# Patient Record
Sex: MALE | Race: WHITE | NOT HISPANIC OR LATINO | Employment: PART TIME | URBAN - METROPOLITAN AREA
[De-identification: names, ages, dates, MRNs, and addresses within clinical notes are randomized per-mention and may not be internally consistent; named-entity substitution may affect disease eponyms.]

---

## 2017-01-23 ENCOUNTER — ALLSCRIPTS OFFICE VISIT (OUTPATIENT)
Dept: OTHER | Facility: OTHER | Age: 77
End: 2017-01-23

## 2017-01-23 DIAGNOSIS — M25.512 PAIN IN LEFT SHOULDER: ICD-10-CM

## 2017-01-30 ENCOUNTER — APPOINTMENT (OUTPATIENT)
Dept: VASCULAR SURGERY | Facility: CLINIC | Age: 77
End: 2017-01-30

## 2017-01-30 VITALS — HEART RATE: 68 BPM | OXYGEN SATURATION: 100 % | DIASTOLIC BLOOD PRESSURE: 73 MMHG | SYSTOLIC BLOOD PRESSURE: 150 MMHG

## 2017-02-13 ENCOUNTER — APPOINTMENT (OUTPATIENT)
Dept: VASCULAR SURGERY | Facility: CLINIC | Age: 77
End: 2017-02-13

## 2017-02-17 ENCOUNTER — APPOINTMENT (OUTPATIENT)
Dept: VASCULAR SURGERY | Facility: CLINIC | Age: 77
End: 2017-02-17

## 2017-02-27 ENCOUNTER — APPOINTMENT (OUTPATIENT)
Dept: LAB | Facility: CLINIC | Age: 77
End: 2017-02-27
Payer: MEDICARE

## 2017-02-27 ENCOUNTER — ALLSCRIPTS OFFICE VISIT (OUTPATIENT)
Dept: OTHER | Facility: OTHER | Age: 77
End: 2017-02-27

## 2017-02-27 DIAGNOSIS — Z01.810 ENCOUNTER FOR PREPROCEDURAL CARDIOVASCULAR EXAMINATION: ICD-10-CM

## 2017-02-27 DIAGNOSIS — Z01.818 ENCOUNTER FOR OTHER PREPROCEDURAL EXAMINATION: ICD-10-CM

## 2017-02-27 DIAGNOSIS — I65.29 OCCLUSION AND STENOSIS OF UNSPECIFIED CAROTID ARTERY: ICD-10-CM

## 2017-02-27 DIAGNOSIS — I10 ESSENTIAL (PRIMARY) HYPERTENSION: ICD-10-CM

## 2017-02-27 DIAGNOSIS — I25.10 ATHEROSCLEROTIC HEART DISEASE OF NATIVE CORONARY ARTERY WITHOUT ANGINA PECTORIS: ICD-10-CM

## 2017-02-27 LAB
ALBUMIN SERPL BCP-MCNC: 3.6 G/DL (ref 3.5–5)
ALP SERPL-CCNC: 89 U/L (ref 46–116)
ALT SERPL W P-5'-P-CCNC: 25 U/L (ref 12–78)
ANION GAP SERPL CALCULATED.3IONS-SCNC: 3 MMOL/L (ref 4–13)
APTT PPP: 27 SECONDS (ref 24–36)
AST SERPL W P-5'-P-CCNC: 30 U/L (ref 5–45)
BASOPHILS # BLD AUTO: 0.02 THOUSANDS/ΜL (ref 0–0.1)
BASOPHILS NFR BLD AUTO: 0 % (ref 0–1)
BILIRUB SERPL-MCNC: 0.46 MG/DL (ref 0.2–1)
BUN SERPL-MCNC: 22 MG/DL (ref 5–25)
CALCIUM SERPL-MCNC: 9 MG/DL (ref 8.3–10.1)
CHLORIDE SERPL-SCNC: 111 MMOL/L (ref 100–108)
CO2 SERPL-SCNC: 28 MMOL/L (ref 21–32)
CREAT SERPL-MCNC: 0.96 MG/DL (ref 0.6–1.3)
EOSINOPHIL # BLD AUTO: 0.2 THOUSAND/ΜL (ref 0–0.61)
EOSINOPHIL NFR BLD AUTO: 4 % (ref 0–6)
ERYTHROCYTE [DISTWIDTH] IN BLOOD BY AUTOMATED COUNT: 13.2 % (ref 11.6–15.1)
GFR SERPL CREATININE-BSD FRML MDRD: >60 ML/MIN/1.73SQ M
GLUCOSE SERPL-MCNC: 105 MG/DL (ref 65–140)
HCT VFR BLD AUTO: 42.6 % (ref 36.5–49.3)
HGB BLD-MCNC: 14.7 G/DL (ref 12–17)
INR PPP: 0.98 (ref 0.86–1.16)
LYMPHOCYTES # BLD AUTO: 1.29 THOUSANDS/ΜL (ref 0.6–4.47)
LYMPHOCYTES NFR BLD AUTO: 24 % (ref 14–44)
MCH RBC QN AUTO: 34.6 PG (ref 26.8–34.3)
MCHC RBC AUTO-ENTMCNC: 34.5 G/DL (ref 31.4–37.4)
MCV RBC AUTO: 100 FL (ref 82–98)
MONOCYTES # BLD AUTO: 0.74 THOUSAND/ΜL (ref 0.17–1.22)
MONOCYTES NFR BLD AUTO: 14 % (ref 4–12)
NEUTROPHILS # BLD AUTO: 3.19 THOUSANDS/ΜL (ref 1.85–7.62)
NEUTS SEG NFR BLD AUTO: 58 % (ref 43–75)
NRBC BLD AUTO-RTO: 0 /100 WBCS
PLATELET # BLD AUTO: 200 THOUSANDS/UL (ref 149–390)
PMV BLD AUTO: 10.7 FL (ref 8.9–12.7)
POTASSIUM SERPL-SCNC: 4.5 MMOL/L (ref 3.5–5.3)
PROT SERPL-MCNC: 6.5 G/DL (ref 6.4–8.2)
PROTHROMBIN TIME: 13.1 SECONDS (ref 12–14.3)
RBC # BLD AUTO: 4.25 MILLION/UL (ref 3.88–5.62)
SODIUM SERPL-SCNC: 142 MMOL/L (ref 136–145)
WBC # BLD AUTO: 5.45 THOUSAND/UL (ref 4.31–10.16)

## 2017-02-27 PROCEDURE — 80053 COMPREHEN METABOLIC PANEL: CPT

## 2017-02-27 PROCEDURE — 85730 THROMBOPLASTIN TIME PARTIAL: CPT

## 2017-02-27 PROCEDURE — 85610 PROTHROMBIN TIME: CPT

## 2017-02-27 PROCEDURE — 36415 COLL VENOUS BLD VENIPUNCTURE: CPT

## 2017-02-27 PROCEDURE — 85025 COMPLETE CBC W/AUTO DIFF WBC: CPT

## 2017-03-06 ENCOUNTER — HOSPITAL ENCOUNTER (OUTPATIENT)
Dept: NON INVASIVE DIAGNOSTICS | Facility: CLINIC | Age: 77
Discharge: HOME | End: 2017-03-06
Payer: MEDICARE

## 2017-03-06 DIAGNOSIS — Z01.810 ENCOUNTER FOR PREPROCEDURAL CARDIOVASCULAR EXAMINATION: ICD-10-CM

## 2017-03-06 DIAGNOSIS — I25.10 ATHEROSCLEROTIC HEART DISEASE OF NATIVE CORONARY ARTERY WITHOUT ANGINA PECTORIS: ICD-10-CM

## 2017-03-06 PROCEDURE — 93017 CV STRESS TEST TRACING ONLY: CPT

## 2017-03-06 PROCEDURE — 78452 HT MUSCLE IMAGE SPECT MULT: CPT

## 2017-03-06 PROCEDURE — A9502 TC99M TETROFOSMIN: HCPCS

## 2017-03-07 LAB
ARRHY DURING EX: NORMAL
CHEST PAIN STATEMENT: NORMAL
MAX DIASTOLIC BP: 60 MMHG
MAX HEART RATE: 114 BPM
MAX PREDICTED HEART RATE: 142 BPM
MAX. SYSTOLIC BP: 164 MMHG
PROTOCOL NAME: NORMAL
REASON FOR TERMINATION: NORMAL
TARGET HR FORMULA: NORMAL
TEST INDICATION: NORMAL
TIME IN EXERCISE PHASE: 423 S

## 2017-03-09 ENCOUNTER — HOSPITAL ENCOUNTER (OUTPATIENT)
Dept: NON INVASIVE DIAGNOSTICS | Facility: CLINIC | Age: 77
Discharge: HOME | End: 2017-03-09
Payer: MEDICARE

## 2017-03-09 ENCOUNTER — ALLSCRIPTS OFFICE VISIT (OUTPATIENT)
Dept: OTHER | Facility: OTHER | Age: 77
End: 2017-03-09

## 2017-03-09 DIAGNOSIS — I25.10 ATHEROSCLEROTIC HEART DISEASE OF NATIVE CORONARY ARTERY WITHOUT ANGINA PECTORIS: ICD-10-CM

## 2017-03-09 DIAGNOSIS — Z01.810 ENCOUNTER FOR PREPROCEDURAL CARDIOVASCULAR EXAMINATION: ICD-10-CM

## 2017-03-09 PROCEDURE — 93306 TTE W/DOPPLER COMPLETE: CPT

## 2017-03-10 ENCOUNTER — GENERIC CONVERSION - ENCOUNTER (OUTPATIENT)
Dept: OTHER | Facility: OTHER | Age: 77
End: 2017-03-10

## 2017-03-15 ENCOUNTER — RESULT REVIEW (OUTPATIENT)
Age: 77
End: 2017-03-15

## 2017-03-15 VITALS
WEIGHT: 160.06 LBS | HEIGHT: 67.5 IN | HEART RATE: 65 BPM | RESPIRATION RATE: 16 BRPM | OXYGEN SATURATION: 98 % | TEMPERATURE: 97 F | DIASTOLIC BLOOD PRESSURE: 52 MMHG | SYSTOLIC BLOOD PRESSURE: 114 MMHG

## 2017-03-15 NOTE — PATIENT PROFILE ADULT. - PMH
Atherosclerosis of coronary artery  ASHD (arteriosclerotic heart disease)  Benign prostatic hypertrophy without lower urinary tract symptoms  BPH (benign prostatic hypertrophy)  Carotid artery stenosis    History of urinary stone  x3 episodes  Hyperlipidemia  Hyperlipidemia

## 2017-03-15 NOTE — PATIENT PROFILE ADULT. - PSH
Calculus of kidney  open [ removal of stone]  H/O left inguinal hernia repair    History of surgery to major organs, presenting hazards to health  x2 surgeries  Other postprocedural status  for nasal fx in past  Status post aorto-coronary artery bypass graft  2011

## 2017-03-16 ENCOUNTER — INPATIENT (INPATIENT)
Facility: HOSPITAL | Age: 77
LOS: 0 days | Discharge: HOME | DRG: 38 | End: 2017-03-17
Attending: SURGERY | Admitting: SURGERY
Payer: MEDICARE

## 2017-03-16 ENCOUNTER — APPOINTMENT (OUTPATIENT)
Dept: VASCULAR SURGERY | Facility: HOSPITAL | Age: 77
End: 2017-03-16

## 2017-03-16 DIAGNOSIS — I65.21 OCCLUSION AND STENOSIS OF RIGHT CAROTID ARTERY: ICD-10-CM

## 2017-03-16 DIAGNOSIS — Z98.890 OTHER SPECIFIED POSTPROCEDURAL STATES: Chronic | ICD-10-CM

## 2017-03-16 LAB
ANION GAP SERPL CALC-SCNC: 9 MMOL/L — SIGNIFICANT CHANGE UP (ref 9–16)
APTT BLD: 59.7 SEC — HIGH (ref 27.5–37.4)
BUN SERPL-MCNC: 16 MG/DL — SIGNIFICANT CHANGE UP (ref 7–23)
CALCIUM SERPL-MCNC: 8.4 MG/DL — LOW (ref 8.5–10.5)
CHLORIDE SERPL-SCNC: 108 MMOL/L — SIGNIFICANT CHANGE UP (ref 96–108)
CO2 SERPL-SCNC: 22 MMOL/L — SIGNIFICANT CHANGE UP (ref 22–31)
CREAT SERPL-MCNC: 0.86 MG/DL — SIGNIFICANT CHANGE UP (ref 0.5–1.3)
GLUCOSE SERPL-MCNC: 134 MG/DL — HIGH (ref 70–99)
HCT VFR BLD CALC: 42.9 % — SIGNIFICANT CHANGE UP (ref 39–50)
HGB BLD-MCNC: 14.7 G/DL — SIGNIFICANT CHANGE UP (ref 13–17)
INR BLD: 1.15 — SIGNIFICANT CHANGE UP (ref 0.88–1.16)
MAGNESIUM SERPL-MCNC: 2.1 MG/DL — SIGNIFICANT CHANGE UP (ref 1.6–2.4)
MCHC RBC-ENTMCNC: 33.5 PG — SIGNIFICANT CHANGE UP (ref 27–34)
MCHC RBC-ENTMCNC: 34.3 G/DL — SIGNIFICANT CHANGE UP (ref 32–36)
MCV RBC AUTO: 97.7 FL — SIGNIFICANT CHANGE UP (ref 80–100)
PHOSPHATE SERPL-MCNC: 2.4 MG/DL — LOW (ref 2.5–4.5)
PLATELET # BLD AUTO: 185 K/UL — SIGNIFICANT CHANGE UP (ref 150–400)
POTASSIUM SERPL-MCNC: 4.4 MMOL/L — SIGNIFICANT CHANGE UP (ref 3.5–5.3)
POTASSIUM SERPL-SCNC: 4.4 MMOL/L — SIGNIFICANT CHANGE UP (ref 3.5–5.3)
PROTHROM AB SERPL-ACNC: 12.8 SEC — SIGNIFICANT CHANGE UP (ref 10–13.1)
RBC # BLD: 4.39 M/UL — SIGNIFICANT CHANGE UP (ref 4.2–5.8)
RBC # FLD: 12.8 % — SIGNIFICANT CHANGE UP (ref 10.3–16.9)
SODIUM SERPL-SCNC: 139 MMOL/L — SIGNIFICANT CHANGE UP (ref 135–145)
WBC # BLD: 6 K/UL — SIGNIFICANT CHANGE UP (ref 3.8–10.5)
WBC # FLD AUTO: 6 K/UL — SIGNIFICANT CHANGE UP (ref 3.8–10.5)

## 2017-03-16 PROCEDURE — 35301 RECHANNELING OF ARTERY: CPT | Mod: RT,GC

## 2017-03-16 PROCEDURE — 93010 ELECTROCARDIOGRAM REPORT: CPT

## 2017-03-16 RX ORDER — SODIUM CHLORIDE 9 MG/ML
250 INJECTION INTRAMUSCULAR; INTRAVENOUS; SUBCUTANEOUS ONCE
Qty: 0 | Refills: 0 | Status: COMPLETED | OUTPATIENT
Start: 2017-03-16 | End: 2017-03-16

## 2017-03-16 RX ORDER — ASPIRIN/CALCIUM CARB/MAGNESIUM 324 MG
2 TABLET ORAL
Qty: 0 | Refills: 0 | COMMUNITY

## 2017-03-16 RX ORDER — SODIUM CHLORIDE 9 MG/ML
1000 INJECTION, SOLUTION INTRAVENOUS
Qty: 0 | Refills: 0 | Status: DISCONTINUED | OUTPATIENT
Start: 2017-03-16 | End: 2017-03-16

## 2017-03-16 RX ORDER — METOPROLOL TARTRATE 50 MG
1 TABLET ORAL
Qty: 0 | Refills: 0 | COMMUNITY

## 2017-03-16 RX ORDER — ASPIRIN/CALCIUM CARB/MAGNESIUM 324 MG
81 TABLET ORAL DAILY
Qty: 0 | Refills: 0 | Status: DISCONTINUED | OUTPATIENT
Start: 2017-03-16 | End: 2017-03-17

## 2017-03-16 RX ORDER — TAMSULOSIN HYDROCHLORIDE 0.4 MG/1
0.4 CAPSULE ORAL AT BEDTIME
Qty: 0 | Refills: 0 | Status: DISCONTINUED | OUTPATIENT
Start: 2017-03-16 | End: 2017-03-17

## 2017-03-16 RX ORDER — ONDANSETRON 8 MG/1
4 TABLET, FILM COATED ORAL EVERY 6 HOURS
Qty: 0 | Refills: 0 | Status: DISCONTINUED | OUTPATIENT
Start: 2017-03-16 | End: 2017-03-17

## 2017-03-16 RX ORDER — SIMVASTATIN 20 MG/1
40 TABLET, FILM COATED ORAL AT BEDTIME
Qty: 0 | Refills: 0 | Status: DISCONTINUED | OUTPATIENT
Start: 2017-03-16 | End: 2017-03-17

## 2017-03-16 RX ORDER — ACETAMINOPHEN 500 MG
650 TABLET ORAL EVERY 6 HOURS
Qty: 0 | Refills: 0 | Status: DISCONTINUED | OUTPATIENT
Start: 2017-03-16 | End: 2017-03-17

## 2017-03-16 RX ORDER — SODIUM CHLORIDE 9 MG/ML
500 INJECTION INTRAMUSCULAR; INTRAVENOUS; SUBCUTANEOUS ONCE
Qty: 0 | Refills: 0 | Status: COMPLETED | OUTPATIENT
Start: 2017-03-16 | End: 2017-03-16

## 2017-03-16 RX ORDER — SODIUM CHLORIDE 9 MG/ML
1000 INJECTION INTRAMUSCULAR; INTRAVENOUS; SUBCUTANEOUS
Qty: 0 | Refills: 0 | Status: DISCONTINUED | OUTPATIENT
Start: 2017-03-16 | End: 2017-03-17

## 2017-03-16 RX ORDER — CEFAZOLIN SODIUM 1 G
2000 VIAL (EA) INJECTION EVERY 8 HOURS
Qty: 0 | Refills: 0 | Status: COMPLETED | OUTPATIENT
Start: 2017-03-16 | End: 2017-03-16

## 2017-03-16 RX ORDER — SIMVASTATIN 20 MG/1
1 TABLET, FILM COATED ORAL
Qty: 0 | Refills: 0 | COMMUNITY

## 2017-03-16 RX ORDER — TAMSULOSIN HYDROCHLORIDE 0.4 MG/1
1 CAPSULE ORAL
Qty: 0 | Refills: 0 | COMMUNITY

## 2017-03-16 RX ORDER — MULTIVIT-MIN/FERROUS GLUCONATE 9 MG/15 ML
1 LIQUID (ML) ORAL
Qty: 0 | Refills: 0 | COMMUNITY

## 2017-03-16 RX ORDER — ASCORBIC ACID 60 MG
1 TABLET,CHEWABLE ORAL
Qty: 0 | Refills: 0 | COMMUNITY

## 2017-03-16 RX ADMIN — Medication 100 MILLIGRAM(S): at 16:41

## 2017-03-16 RX ADMIN — SODIUM CHLORIDE 1000 MILLILITER(S): 9 INJECTION INTRAMUSCULAR; INTRAVENOUS; SUBCUTANEOUS at 19:25

## 2017-03-16 RX ADMIN — SIMVASTATIN 40 MILLIGRAM(S): 20 TABLET, FILM COATED ORAL at 22:23

## 2017-03-16 RX ADMIN — TAMSULOSIN HYDROCHLORIDE 0.4 MILLIGRAM(S): 0.4 CAPSULE ORAL at 19:33

## 2017-03-16 RX ADMIN — SODIUM CHLORIDE 2000 MILLILITER(S): 9 INJECTION INTRAMUSCULAR; INTRAVENOUS; SUBCUTANEOUS at 22:24

## 2017-03-16 RX ADMIN — SODIUM CHLORIDE 1000 MILLILITER(S): 9 INJECTION INTRAMUSCULAR; INTRAVENOUS; SUBCUTANEOUS at 19:35

## 2017-03-16 RX ADMIN — Medication 81 MILLIGRAM(S): at 12:24

## 2017-03-16 RX ADMIN — SODIUM CHLORIDE 75 MILLILITER(S): 9 INJECTION INTRAMUSCULAR; INTRAVENOUS; SUBCUTANEOUS at 19:04

## 2017-03-16 NOTE — BRIEF OPERATIVE NOTE - OPERATION/FINDINGS
Procedure: Right carotid artery endarterectomy  Findings: Fibrotic calcified plaque at carotid bifurcation right side

## 2017-03-16 NOTE — PROGRESS NOTE ADULT - SUBJECTIVE AND OBJECTIVE BOX
Procedure: Right Carotid Endarterectomy    Diagnosis/Indication: Asymptomatic high grade LYUDMILA stenosis    Surgeon: Dr Youssef and Dr Shepard    S: Pt has no complaints.    O:  T(C): 36.9, Max: 36.9 (03-16 @ 10:55)  T(F): 98.5, Max: 98.5 (03-16 @ 10:55)  HR: 70 (70 - 81)  BP: 119/59 (113/56 - 132/56)  RR: 14 (11 - 19)  SpO2: 98% (96% - 98%)  Wt(kg): --                        14.7   6.0   )-----------( 185      ( 16 Mar 2017 11:16 )             42.9     16 Mar 2017 11:16    139    |  108    |  16     ----------------------------<  134    4.4     |  22     |  0.86     Ca    8.4        16 Mar 2017 11:16  Phos  2.4       16 Mar 2017 11:16  Mg     2.1       16 Mar 2017 11:16        Gen:   C/V:   Pulm:   Abd:   Extremities:      A/P: 77yMale s/p above procedure  Diet:  IVF:  Pain/nausea control  DVT ppx:  Dispo plan: Procedure: Right Carotid Endarterectomy    Diagnosis/Indication: Asymptomatic high grade LYUDMILA stenosis    Surgeon: Dr Youssef and Dr Shepard    S: Pt has no complaints.  No SOB. No CP. No HA    O:  T(C): 36.9, Max: 36.9 (03-16 @ 10:55)  T(F): 98.5, Max: 98.5 (03-16 @ 10:55)  HR: 70 (70 - 81)  BP: 119/59 (113/56 - 132/56)  RR: 14 (11 - 19)  SpO2: 98% (96% - 98%)  Wt(kg): --                        14.7   6.0   )-----------( 185      ( 16 Mar 2017 11:16 )             42.9     16 Mar 2017 11:16    139    |  108    |  16     ----------------------------<  134    4.4     |  22     |  0.86     Ca    8.4        16 Mar 2017 11:16  Phos  2.4       16 Mar 2017 11:16  Mg     2.1       16 Mar 2017 11:16    EKG: 3/16/17  Diagnosis Line Normal sinus rhythm    Gen: 76 y/o M w/d, w/n, NAD  Neuro: awake, alert, Ox3.             cranial N II - XII intact             motor: upper and lower b/l 5/5  Neck: Right neck dressing cdi. No hematoma or swelling.  C/V:  RRR  Pulm: Clear  Abd: Soft, nondistended  Extremities: symmetrical, no edema      A/P: 77yMale s/p Right CEA. No neuro deficits. BP stable.  Diet: Clears  IVF: NS to 75  Pain/nausea control  Hold metoprolol (home med).  Resume ASA, simvastatin, flomax  DVT ppx:  Dispo plan:

## 2017-03-16 NOTE — PROVIDER CONTACT NOTE (CHANGE IN STATUS NOTIFICATION) - SITUATION
Pt with difficulty voiding,has voided small amouts total 150cc. Pa Shar made aware.bladder scan 547cc urine in bladder, Pa made aware.

## 2017-03-17 ENCOUNTER — TRANSCRIPTION ENCOUNTER (OUTPATIENT)
Age: 77
End: 2017-03-17

## 2017-03-17 VITALS
DIASTOLIC BLOOD PRESSURE: 60 MMHG | SYSTOLIC BLOOD PRESSURE: 107 MMHG | RESPIRATION RATE: 16 BRPM | HEART RATE: 75 BPM | OXYGEN SATURATION: 96 %

## 2017-03-17 PROBLEM — I65.29 OCCLUSION AND STENOSIS OF UNSPECIFIED CAROTID ARTERY: Chronic | Status: ACTIVE | Noted: 2017-03-15

## 2017-03-17 LAB
ANION GAP SERPL CALC-SCNC: 7 MMOL/L — LOW (ref 9–16)
BUN SERPL-MCNC: 15 MG/DL — SIGNIFICANT CHANGE UP (ref 7–23)
CALCIUM SERPL-MCNC: 8 MG/DL — LOW (ref 8.5–10.5)
CHLORIDE SERPL-SCNC: 111 MMOL/L — HIGH (ref 96–108)
CO2 SERPL-SCNC: 23 MMOL/L — SIGNIFICANT CHANGE UP (ref 22–31)
CREAT SERPL-MCNC: 0.88 MG/DL — SIGNIFICANT CHANGE UP (ref 0.5–1.3)
GLUCOSE SERPL-MCNC: 84 MG/DL — SIGNIFICANT CHANGE UP (ref 70–99)
HCT VFR BLD CALC: 38 % — LOW (ref 39–50)
HGB BLD-MCNC: 13 G/DL — SIGNIFICANT CHANGE UP (ref 13–17)
MAGNESIUM SERPL-MCNC: 2 MG/DL — SIGNIFICANT CHANGE UP (ref 1.6–2.4)
MCHC RBC-ENTMCNC: 33.6 PG — SIGNIFICANT CHANGE UP (ref 27–34)
MCHC RBC-ENTMCNC: 34.2 G/DL — SIGNIFICANT CHANGE UP (ref 32–36)
MCV RBC AUTO: 98.2 FL — SIGNIFICANT CHANGE UP (ref 80–100)
PHOSPHATE SERPL-MCNC: 2.3 MG/DL — LOW (ref 2.5–4.5)
PLATELET # BLD AUTO: 165 K/UL — SIGNIFICANT CHANGE UP (ref 150–400)
POTASSIUM SERPL-MCNC: 4.1 MMOL/L — SIGNIFICANT CHANGE UP (ref 3.5–5.3)
POTASSIUM SERPL-SCNC: 4.1 MMOL/L — SIGNIFICANT CHANGE UP (ref 3.5–5.3)
RBC # BLD: 3.87 M/UL — LOW (ref 4.2–5.8)
RBC # FLD: 13.4 % — SIGNIFICANT CHANGE UP (ref 10.3–16.9)
SODIUM SERPL-SCNC: 141 MMOL/L — SIGNIFICANT CHANGE UP (ref 135–145)
WBC # BLD: 10.8 K/UL — HIGH (ref 3.8–10.5)
WBC # FLD AUTO: 10.8 K/UL — HIGH (ref 3.8–10.5)

## 2017-03-17 PROCEDURE — C1768: CPT

## 2017-03-17 PROCEDURE — 85027 COMPLETE CBC AUTOMATED: CPT

## 2017-03-17 PROCEDURE — 84100 ASSAY OF PHOSPHORUS: CPT

## 2017-03-17 PROCEDURE — 83735 ASSAY OF MAGNESIUM: CPT

## 2017-03-17 PROCEDURE — 93005 ELECTROCARDIOGRAM TRACING: CPT

## 2017-03-17 PROCEDURE — 88304 TISSUE EXAM BY PATHOLOGIST: CPT

## 2017-03-17 PROCEDURE — 86900 BLOOD TYPING SEROLOGIC ABO: CPT

## 2017-03-17 PROCEDURE — 85610 PROTHROMBIN TIME: CPT

## 2017-03-17 PROCEDURE — 86901 BLOOD TYPING SEROLOGIC RH(D): CPT

## 2017-03-17 PROCEDURE — 85730 THROMBOPLASTIN TIME PARTIAL: CPT

## 2017-03-17 PROCEDURE — 36415 COLL VENOUS BLD VENIPUNCTURE: CPT

## 2017-03-17 PROCEDURE — 80048 BASIC METABOLIC PNL TOTAL CA: CPT

## 2017-03-17 PROCEDURE — 86850 RBC ANTIBODY SCREEN: CPT

## 2017-03-17 RX ORDER — SODIUM CHLORIDE 9 MG/ML
250 INJECTION INTRAMUSCULAR; INTRAVENOUS; SUBCUTANEOUS ONCE
Qty: 0 | Refills: 0 | Status: COMPLETED | OUTPATIENT
Start: 2017-03-17 | End: 2017-03-17

## 2017-03-17 RX ADMIN — SODIUM CHLORIDE 1000 MILLILITER(S): 9 INJECTION INTRAMUSCULAR; INTRAVENOUS; SUBCUTANEOUS at 02:15

## 2017-03-17 RX ADMIN — Medication 81 MILLIGRAM(S): at 10:32

## 2017-03-17 NOTE — DISCHARGE NOTE ADULT - CARE PROVIDER_API CALL
Gabriella Youssef), Surgery; Vascular Surgery  [address] [place] [address]  Phone: [phone]  Fax: [phone]

## 2017-03-17 NOTE — DISCHARGE NOTE ADULT - HOSPITAL COURSE
78 y/o M with right sided asymptomatic carotid artery stenosis. Here for an elective right CEA.  He tolerated the procedure well and postoperatively diet was advanced.  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 In the PACU his sbp was recorded down to the 80s, but responded appropriately to IV bolus.  He initially failed his TOV but subsequently passed on POD1 with a PVR of 92.  He was sent home on POD1 in stable condition with plans for follow up care in the office on POD4.

## 2017-03-17 NOTE — DISCHARGE NOTE ADULT - PLAN OF CARE
Recovery Please call the office to set up an appointment for a follow up visit with Dr. Youssef for next Monday 3/20.  You may continue a regular diet as tolerated.  You may shower when you get home, however do not bathe or submerge your wound under water.  After showering please pat the wound dry and cleanse it with hydrogen peroxide.  Then pat dry once again.  Please call the office or come to the ED if you develop worsening pain, a fever > 101F, have difficulty breathing, develop visual changes, weakness on your left side. Pain control You may take 650mg Tylenol every 6hrs as needed for pain.  If your pain is worsening and not adequately controlled by the Tylenol please call the office.

## 2017-03-17 NOTE — DISCHARGE NOTE ADULT - MEDICATION SUMMARY - MEDICATIONS TO TAKE
I will START or STAY ON the medications listed below when I get home from the hospital:    aspirin 81 mg oral tablet  -- 2 tab(s) by mouth once a day  -- Indication: For Antiplatelet therapy    tamsulosin 0.4 mg oral capsule  -- 1 cap(s) by mouth once a day  -- Indication: For Home medication    simvastatin 40 mg oral tablet  -- 1 tab(s) by mouth once a day (at bedtime)  -- Indication: For Home medication    metoprolol tartrate 50 mg oral tablet  -- 1 tab(s) by mouth once a day  -- Indication: For Home medication,  please restart on 3/18    Centrum Silver oral tablet  -- 1 tab(s) by mouth once a day  -- Indication: For Home medication    Vitamin C 500 mg oral tablet  -- 1 tab(s) by mouth once a day  -- Indication: For Home medication

## 2017-03-17 NOTE — DISCHARGE NOTE ADULT - CARE PLAN
Principal Discharge DX:	Stenosis of right carotid artery  Goal:	Recovery  Instructions for follow-up, activity and diet:	Please call the office to set up an appointment for a follow up visit with Dr. Youssef for next Monday 3/20.  You may continue a regular diet as tolerated.  You may shower when you get home, however do not bathe or submerge your wound under water.  After showering please pat the wound dry and cleanse it with hydrogen peroxide.  Then pat dry once again.  Please call the office or come to the ED if you develop worsening pain, a fever > 101F, have difficulty breathing, develop visual changes, weakness on your left side.  Goal:	Pain control  Instructions for follow-up, activity and diet:	You may take 650mg Tylenol every 6hrs as needed for pain.  If your pain is worsening and not adequately controlled by the Tylenol please call the office.

## 2017-03-17 NOTE — PROGRESS NOTE ADULT - SUBJECTIVE AND OBJECTIVE BOX
o/n: 500cc bolus x2 given due to SBP in 90s  3/16: SDA right CEA, POC, labs, EKG okay. Passed TOV. Neck C/D/I      77yoM with carotid stenosis s/p Rt CEA 3/16  CLD  asa  IVF  pain control  cont home meds  f/u am labs o/n: 500cc bolus x2 given due to SBP in 90s  3/16: SDA right CEA, POC, labs, EKG okay. Passed TOV. Neck C/D/I      aspirin enteric coated 81  tamsulosin 0.4        Vital Signs Last 24 Hrs  T(C): 36.9, Max: 36.9 (03-16 @ 10:55)  T(F): 98.4, Max: 98.5 (03-16 @ 10:55)  HR: 72 (61 - 81)  BP: 100/57 (82/46 - 146/64)  BP(mean): 56 (56 - 56)  RR: 16 (11 - 19)  SpO2: 95% (94% - 99%)  I&O's Summary    I & Os for current day (as of 17 Mar 2017 09:16)  =============================================  IN: 2525 ml / OUT: 1775 ml / NET: 750 ml      Physical Exam:  Gen: 76 y/o M w/d, w/n, NAD  Neuro: awake, alert, Ox3.             mild right lower facial weakness consistent, cranial N II - XII intact             motor: upper and lower b/l 5/5  Neck: Right neck dressing cdi. No hematoma or swelling.  C/V:  RRR  Pulm: Clear  Abd: Soft, nondistended  Extremities: symmetrical, no edema      LABS:                        13.0   10.8  )-----------( 165      ( 17 Mar 2017 06:53 )             38.0     17 Mar 2017 06:53    141    |  111    |  15     ----------------------------<  84     4.1     |  23     |  0.88     Ca    8.0        17 Mar 2017 06:53  Phos  2.3       17 Mar 2017 06:53  Mg     2.0       17 Mar 2017 06:53      PT/INR - ( 16 Mar 2017 11:16 )   PT: 12.8 sec;   INR: 1.15          PTT - ( 16 Mar 2017 11:16 )  PTT:59.7 sec    Radiology and Additional Studies:    Assessment and Plan:     77yoM with carotid stenosis s/p Rt CEA 3/16  CLD  asa  IVF  pain control  cont home meds  f/u am labs o/n: 500cc bolus x2 given due to SBP in 90s  3/16: SDA right CEA, POC, labs, EKG okay. Passed TOV. Neck C/D/I      Pt seen and examined at bedside reports that his neck incision is sore and has noticed that the right side of his smile is a little different.  Otherwise reports voiding post cook removal. Denies HA/Change in vision, dizziness, focal neurologic deficits    aspirin enteric coated 81  tamsulosin 0.4        Vital Signs Last 24 Hrs  T(C): 36.9, Max: 36.9 (03-16 @ 10:55)  T(F): 98.4, Max: 98.5 (03-16 @ 10:55)  HR: 72 (61 - 81)  BP: 100/57 (82/46 - 146/64)  BP(mean): 56 (56 - 56)  RR: 16 (11 - 19)  SpO2: 95% (94% - 99%)  I&O's Summary    I & Os for current day (as of 17 Mar 2017 09:16)  =============================================  IN: 2525 ml / OUT: 1775 ml / NET: 750 ml      Physical Exam:  Gen: 78 y/o M w/d, w/n, NAD  Neuro: awake, alert, Ox3.             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 Dressing removed incision c/d/i.  C/V:  RRR  Pulm: Clear  Abd: Soft, nondistended  Extremities: symmetrical, no edema      LABS:                        13.0   10.8  )-----------( 165      ( 17 Mar 2017 06:53 )             38.0     17 Mar 2017 06:53    141    |  111    |  15     ----------------------------<  84     4.1     |  23     |  0.88     Ca    8.0        17 Mar 2017 06:53  Phos  2.3       17 Mar 2017 06:53  Mg     2.0       17 Mar 2017 06:53      PT/INR - ( 16 Mar 2017 11:16 )   PT: 12.8 sec;   INR: 1.15          PTT - ( 16 Mar 2017 11:16 )  PTT:59.7 sec    Radiology and Additional Studies:    Assessment and Plan:     77yoM with carotid stenosis s/p Rt CEA 3/16  Will obtain PVR this AM to evaluate for urinary retention  Reg diet  ASA daily  Home meds   Discharge today with close outpt follow up care

## 2017-03-17 NOTE — DISCHARGE NOTE ADULT - PATIENT PORTAL LINK FT
“You can access the Follow[employer] Patient Portal, offered by [facility], by registering with the following website: [email or web address]”

## 2017-03-17 NOTE — DISCHARGE NOTE ADULT - CARE PROVIDERS DIRECT ADDRESSES
[email or web address]

## 2017-03-20 ENCOUNTER — APPOINTMENT (OUTPATIENT)
Dept: VASCULAR SURGERY | Facility: CLINIC | Age: 77
End: 2017-03-20

## 2017-03-20 DIAGNOSIS — N20.0 CALCULUS OF KIDNEY: ICD-10-CM

## 2017-03-20 DIAGNOSIS — I65.21 OCCLUSION AND STENOSIS OF RIGHT CAROTID ARTERY: ICD-10-CM

## 2017-03-20 DIAGNOSIS — N40.0 BENIGN PROSTATIC HYPERPLASIA WITHOUT LOWER URINARY TRACT SYMPTOMS: ICD-10-CM

## 2017-03-20 DIAGNOSIS — Z95.1 PRESENCE OF AORTOCORONARY BYPASS GRAFT: ICD-10-CM

## 2017-03-20 DIAGNOSIS — S04.51XA INJURY OF FACIAL NERVE, RIGHT SIDE, INITIAL ENCOUNTER: ICD-10-CM

## 2017-03-20 DIAGNOSIS — T81.89XA OTHER COMPLICATIONS OF PROCEDURES, NOT ELSEWHERE CLASSIFIED, INITIAL ENCOUNTER: ICD-10-CM

## 2017-03-20 DIAGNOSIS — I25.10 ATHEROSCLEROTIC HEART DISEASE OF NATIVE CORONARY ARTERY WITHOUT ANGINA PECTORIS: ICD-10-CM

## 2017-03-20 DIAGNOSIS — J44.9 CHRONIC OBSTRUCTIVE PULMONARY DISEASE, UNSPECIFIED: ICD-10-CM

## 2017-03-20 DIAGNOSIS — M19.90 UNSPECIFIED OSTEOARTHRITIS, UNSPECIFIED SITE: ICD-10-CM

## 2017-03-20 DIAGNOSIS — Z86.73 PERSONAL HISTORY OF TRANSIENT ISCHEMIC ATTACK (TIA), AND CEREBRAL INFARCTION WITHOUT RESIDUAL DEFICITS: ICD-10-CM

## 2017-03-20 DIAGNOSIS — Z79.82 LONG TERM (CURRENT) USE OF ASPIRIN: ICD-10-CM

## 2017-03-21 LAB — SURGICAL PATHOLOGY STUDY: SIGNIFICANT CHANGE UP

## 2017-04-03 ENCOUNTER — APPOINTMENT (OUTPATIENT)
Dept: VASCULAR SURGERY | Facility: CLINIC | Age: 77
End: 2017-04-03

## 2017-04-03 VITALS — HEART RATE: 75 BPM | OXYGEN SATURATION: 95 % | DIASTOLIC BLOOD PRESSURE: 75 MMHG | SYSTOLIC BLOOD PRESSURE: 119 MMHG

## 2017-04-11 ENCOUNTER — ALLSCRIPTS OFFICE VISIT (OUTPATIENT)
Dept: OTHER | Facility: OTHER | Age: 77
End: 2017-04-11

## 2017-04-24 ENCOUNTER — APPOINTMENT (OUTPATIENT)
Dept: VASCULAR SURGERY | Facility: CLINIC | Age: 77
End: 2017-04-24

## 2017-07-10 ENCOUNTER — APPOINTMENT (OUTPATIENT)
Dept: VASCULAR SURGERY | Facility: CLINIC | Age: 77
End: 2017-07-10

## 2017-08-28 ENCOUNTER — ALLSCRIPTS OFFICE VISIT (OUTPATIENT)
Dept: OTHER | Facility: OTHER | Age: 77
End: 2017-08-28

## 2017-08-28 DIAGNOSIS — M25.542 ARTHRALGIA OF LEFT HAND: ICD-10-CM

## 2017-10-18 ENCOUNTER — ALLSCRIPTS OFFICE VISIT (OUTPATIENT)
Dept: OTHER | Facility: OTHER | Age: 77
End: 2017-10-18

## 2017-10-19 NOTE — PROGRESS NOTES
Assessment  Assessed    1  Chest discomfort (786 59) (R07 89)   2  Atherosclerosis of coronary artery (414 00) (I25 10)   3  Hyperlipidemia (272 4) (E78 5)   4  Dizziness (780 4) (R42)    Plan   Chest discomfort    · Nitroglycerin 0 4 MG Sublingual Tablet Sublingual; TAKE AS DIRECTED   Rx By: Asya Vega; Dispense: 0 Days ; #:25 Tablet Sublingual; Refill: 2;For: Chest discomfort; LINNEA = N; Verified Transmission to [facility] 8619; Last Updated By: System, [employer]; 10/18/2017 10:05:40 AM    EKG/ECG- POC; Status:Active - Perform Order; Requested [id number];   Due:18Oct2018; Ordered; For:Chest discomfort; Ordered By:Kira Holland; Discussion/Summary  Cardiology Discussion Summary Free Text Note Form [facility]:   Patient with known history of CAD status post CABG, hyperlipidemia, history of carotid artery stenosis with symptoms of exertional chest pain and dizziness  This could represent angina  Patient did have a nuclear stress test earlier this year which did not show any ischemia  However given new onset symptoms further evaluation including cardiac catheterization presented to the patient  and benefits of cardiac catheterization and possible revascularization options including but not limited to risk of infection, bleeding, risk of myocardial infarction, stroke, and risk of death discussed at length with patient  Patient understands the risks and benefits and wants to think about it and discuss with his family  Cardiac catheterization will be scheduled   Preprocedure workup will be done  Further cardiac evaluation and management after the results of cardiac catheterization  would like to discuss with his family and may want to get this procedure done in [place] which would be fine with me  Previous cardiac studies reviewed with the patient  Patient has been instructed to let us know how he wants to proceed with further cardiac evaluation  Goals and Barriers:  The patient has the current Goals: Compliance with medications  The patent has the current Barriers: None  Patient's Capacity to Self-Care: Patient is able to Self-Care  Patient Education: Educational resources provided: Cardiac catheterization booklet  Medication SE Review and Pt Understands Tx: Possible side effects of new medications were reviewed with the patient/guardian today  Counseling Documentation With Imm: The patient was counseled regarding risk factor reductions,-- impressions,-- risks and benefits of treatment options,-- importance of compliance with treatment  Chief Complaint  Chief Complaint Chronic Condition [facility]: Patient is here today for follow up of chronic conditions described in HPI  History of Present Illness  Cardiology [facility] Free Text Note Form [facility]: Patient presents for follow-up visit  Patient having intermittent episodes of chest discomfort with exertion  Patient also has dizziness  Patient had carotid surgery in [place] recently on the right side  Patient does have history of CAD status post CABG  Patient did have a nuclear stress test earlier this year which did not show any significant ischemia  However the symptoms are new for him  Patient denies any history of leg edema orthopnea PND  No history of palpitations  Patient states that he has been compliant with all his present medications  Review of Systems  Cardiology Male ROS:     Cardiac: as noted in HPI  Skin: No complaints of nonhealing sores or skin rash  Genitourinary: No complaints of recurrent urinary tract infections, frequent urination at night, difficult urination, blood in urine, kidney stones, loss of bladder control, no kidney or prostate problems, no erectile dysfunction  Psychological: No complaints of feeling depressed, anxiety, panic attacks, or difficulty concentrating     General: No complaints of trouble sleeping, lack of energy, fatigue, appetite changes, weight changes, fever, frequent infections, or night sweats  Respiratory: No complaints of shortness of breath, cough with sputum, or wheezing  HEENT: No complaints of serious problems, hearing problems, nose problems, throat problems, or snoring  Gastrointestinal: No complaints of liver problems, nausea, vomiting, heartburn, constipation, bloody stools, diarrhea, problems swallowing, adbominal pain, or rectal bleeding  Hematologic: No complaints of bleeding disorders, anemia, blood clots, or excessive brusing  Neurological: dizziness   Musculoskeletal: No complaints of arthritis, back pain, or painfull swelling  ROS Reviewed:   ROS reviewed  Active Problems  Problems    1  Abnormal CT scan, gastrointestinal tract (793 4) (R93 3)   2  Abnormal loss of weight (783 21) (R63 4)   3  Atherosclerosis of coronary artery (414 00) (I25 10)   4  Atherosclerosis of coronary artery (414 00) (I25 10)   5  Back pain (724 5) (M54 9)   6  Benign localized prostatic hyperplasia with lower urinary tract symptoms (LUTS)   (600 21,599 69) (N40 1)   7  Bilateral impacted cerumen (380 4) (H61 23)   8  Blood in stool (578 1) (K92 1)   9  CABG   10  Carotid artery stenosis (433 10) (I65 29)   11  Carotid bruit (785 9) (R09 89)   12  Cataract (366 9) (H26 9)   13  Cervical radiculopathy (723 4) (M54 12)   14  Chronic left shoulder pain (719 41,338 29) (M25 512,G89 29)   15  Chronic obstructive pulmonary disease (496) (J44 9)   16  Constipation (564 00) (K59 00)   17  Diarrhea (787 91) (R19 7)   18  Emphysema (492 8) (J43 9)   19  Encounter for screening for malignant neoplasm of colon (V76 51) (Z12 11)   20  Esophageal reflux (530 81) (K21 9)   21  Essential hypertension (401 9) (I10)   22  Flu vaccine need (V04 81) (Z23)   23  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)   24  Heart disease (429 9) (I51 9)   25  Hyperlipidemia (272 4) (E78 5)   26  Impaired fasting glucose (790 21) (R73 01)   27  Inguinal hernia (550 90) (K40 90)   28   Joint pain in fingers of left hand (719 44) (M25 542)   29  Left flank pain (789 09) (R10 9)   30  Memory loss (780 93) (R41 3)   31  Need for prophylactic vaccination against Streptococcus pneumoniae (pneumococcus)    (V03 82) (Z23)   32  Need for revaccination (V05 9) (Z23)   33  Need for vaccination for DTP (V06 1) (Z23)   34  Nephrolithiasis (592 0) (N20 0)   35  Occlusion and stenosis of unspecified carotid artery (433 10) (I65 29)   36  Osteoarthritis of hip (715 95) (M16 9)   37  Pain in the abdomen (789 00) (R10 9)   38  Plantar warts (078 12) (B07 0)   39  Pre-operative cardiovascular examination (V72 81) (Z01 810)   40  Preoperative clearance (V72 84) (Z01 818)   41  PVD (peripheral vascular disease) (443 9) (I73 9)   42  Renal colic (149 1) (P80)   43  Right foot pain (729 5) (M79 671)   44  Screening for genitourinary condition (V81 6) (Z13 89)   45  Shortness of breath (786 05) (R06 02)   46  Special screening for malignant neoplasm of prostate (V76 44) (Z12 5)   47  Strain of neck muscle, initial encounter (847 0) (S16 1XXA)   48  Subjective tinnitus of left ear (388 31) (H93 12)   49  Trigger finger, left (727 03) (M65 30)    Past Medical History  Problems    1  History of Coronary Artery Disease (V12 59)   2  Denied: History of depression   3  History of essential hypertension (V12 59) (Z86 79)   4  History of hyperlipidemia (V12 29) (Z86 39)   5  Denied: History of substance abuse   6  Need for vaccination for DTP (V06 1) (Z23)   7  History of Renal lesion (593 9) (N28 9)  Active Problems And Past Medical History Reviewed: The active problems and past medical history were reviewed and updated today  Surgical History  Problems    1  CABG   2  History of Coronary Artery Surgery   3  History of Endarterectomy Internal Carotid   4  History of Hernia Repair   5  History of Hip Replacement   6  History of Renal Lithotripsy  Surgical History Reviewed: The surgical history was reviewed and updated today         Family History  Mother    1  Denied: Family history of Depression with anxiety   2  Denied: Family history of substance abuse   3  Family history of Nephrolithiasis  Father    4  Denied: Family history of Depression with anxiety   5  Denied: Family history of substance abuse   6  Family history of Stroke Syndrome (V17 1)  Family History Reviewed: The family history was reviewed and updated today  Social History  Problems    · Being A Social Drinker   · Denied: History of Drug use   · Former smoker (V15 82) (X03 525)   · Marital History -   Social History Reviewed: The social history was reviewed and updated today  Current Meds   1  Aspirin EC 81 MG Oral Tablet Delayed Release; 2 tabs daily; Therapy: (Recorded:05Jun2015) to Recorded   2  Donepezil HCl - 5 MG Oral Tablet; TAKE 1 TABLET Bedtime; Therapy: 77NGU6258 to (Evaluate:22Apr2017)  Requested for: 24Oct2016; Last   Rx:24Oct2016 Ordered   3  Meloxicam 15 MG Oral Tablet; TAKE 1 TABLET DAILY WITH FOOD; Therapy: 46FAV4200 to (Evaluate:16Mar2017)  Requested for: 51LPS2654; Last   Rx:23Jan2017 Ordered   4  Metoprolol Succinate ER 50 MG Oral Tablet Extended Release 24 Hour; TAKE 1 TABLET   DAILY  Requested for: 58Krv5129; Last Rx:28Rlh9683 Ordered   5  Multivitamins Oral Capsule; TAKE 1 CAPSULE DAILY; Therapy: (Monae Winter) to Recorded   6  Prevnar 13 Intramuscular Suspension; inject as directed; Therapy: 96UPY7120 to (Last Rx:12Nov2015)  Requested for: 00QRJ0110 Ordered   7  Simvastatin 40 MG Oral Tablet; take 1 tablet daily at bedtime  Requested for: 88Qlm6809;   Last Rx:02Rko8494 Ordered   8  Tamsulosin HCl - 0 4 MG Oral Capsule; take 1 capsule daily; Therapy: 56Hwq4429 to (Complete:43Flw4885)  Requested for: 49Rje1813; Last   Rx:73Ihm7897 Ordered   9  Vitamin C TABS; TAKE 1 TABLET DAILY; Therapy: (AQZQLPOW:54KWM2925) to Recorded  Medication List Reviewed: The medication list was reviewed and updated today  Allergies  Medication    1  No Known Drug Allergies    Vitals  Vital Signs    Recorded: 08QXH9573 09:41AM   Heart Rate 70   Systolic 274   Diastolic 60   Height 5 ft 8 in   Weight 160 lb    BMI Calculated 24 33   BSA Calculated 1 86   O2 Saturation 97     Physical Exam    Constitutional   General appearance: No acute distress, well appearing and well nourished  Eyes   Conjunctiva and Sclera examination: Conjunctiva pink, sclera anicteric  Ears, Nose, Mouth, and Throat - Oropharynx: Clear, nares are clear, mucous membranes are moist    Neck   Neck and thyroid: Normal, supple, trachea midline, no thyromegaly  Pulmonary   Respiratory effort: No increased work of breathing or signs of respiratory distress  Auscultation of lungs: Clear to auscultation, no rales, no rhonchi, no wheezing, good air movement  Cardiovascular   Auscultation of heart: Normal rate and rhythm, normal S1 and S2, no murmurs  Carotid pulses: Abnormal  -- Carotid bruit  Peripheral vascular exam: Normal pulses throughout, no tenderness, erythema or swelling  Pedal pulses: Normal, 2+ bilaterally  Examination of extremities for edema and/or varicosities: Normal     Abdomen   Abdomen: Non-tender and no distention  Liver and spleen: No hepatomegaly or splenomegaly  Musculoskeletal Gait and station: Normal gait  -- Digits and nails: Normal without clubbing or cyanosis  -- Inspection/palpation of joints, bones, and muscles: Normal, ROM normal     Skin - Skin and subcutaneous tissue: Normal without rashes or lesions  Skin is warm and well perfused, normal turgor  Neurologic - Cranial nerves: II - XII intact  -- Speech: Normal     Psychiatric - Orientation to person, place, and time: Normal -- Mood and affect: Normal       Results/Data  ECG Report: Sinus rhythm  Cannot exclude anteroseptal myocardial infarction of indeterminate age  Nonspecific ST segment abnormalities  No significant changes compared to previous EKGs  Health Management  Blood in stool   COLONOSCOPY; every 10 years; Last 50Fxs6287; Next Due: 01Jsy6751; Active  Health Maintenance   *VB - Fall Risk Assessment  (Dx Z13 89 Screen for Neurologic Disorder); every 1 year; Last  61EZP9930; Next Due: 57EIF7758; Overdue    Future Appointments    Date/Time Provider Specialty Site   11/14/2017 11:30 AM TIFFANI Tijerina   Internal Medicine [facility]     Signatures   Electronically signed by : TIFFANI Schmitt ; Oct 18 2017  4:37PM EST                       (Author)

## 2017-11-14 ENCOUNTER — ALLSCRIPTS OFFICE VISIT (OUTPATIENT)
Dept: OTHER | Facility: OTHER | Age: 77
End: 2017-11-14

## 2017-11-14 DIAGNOSIS — M79.642 PAIN OF LEFT HAND: ICD-10-CM

## 2017-11-14 DIAGNOSIS — M79.641 PAIN OF RIGHT HAND: ICD-10-CM

## 2017-11-14 DIAGNOSIS — I10 ESSENTIAL (PRIMARY) HYPERTENSION: ICD-10-CM

## 2017-11-14 DIAGNOSIS — R73.01 IMPAIRED FASTING GLUCOSE: ICD-10-CM

## 2017-11-15 NOTE — PROGRESS NOTES
Assessment    1  Pain in both hands (729 5) (M79 641,M79 642)   2  Chronic nonintractable headache, unspecified headache type (784 0) (R51)   3  Essential hypertension (401 9) (I10)   4  Impaired fasting glucose (790 21) (R73 01)    Plan  Essential hypertension, Impaired fasting glucose    · (1) LIPID PANEL FASTING W DIRECT LDL REFLEX; Status:Active; Requested for:14Nov2017;   Flu vaccine need    · Fluzone High-Dose 0 5 ML Intramuscular Suspension Prefilled Syringe  Impaired fasting glucose    · (1) COMPREHENSIVE METABOLIC PANEL; Status:Active; Requested for:14Nov2017;    · (1) HEMOGLOBIN A1C; Status:Active; Requested for:14Nov2017;    · (1) MICROALBUMIN CREATININE RATIO, RANDOM URINE; Status:Active; Requested for:14Nov2017;    · XR SKULL < 4 VIEW; Status:Active; Requested for:14Nov2017;    · 1 - Hay Santiago MD, [facility]  (Neurology) Co-Management  *  Status: Active  Requested URO:[id number]  Care Summary provided  : Yes  Pain in both hands    · (1) C-REACTIVE PROTEIN; Status:Active; Requested YBE:[id number];    · (1) CYCLIC CITRULLINATED PEPTIDE; Status:Active; Requested for:14Nov2017;    · (1) LYME ANTIBODY PROFILE W/REFLEX TO WESTERN BLOT; Status:Active; Requestedfor:14Nov2017;    · (1) RHEUMATOID FACTOR SCREEN; Status:Active; Requested for:14Nov2017;     Discussion/Summary  Discussion Summary:   1) hand pain mostly proximal jt  Will order RF, ccp crp and lyme titerheadache  possible neuropathic  Will check skull xray and refer to neurohtn well controlled check bmpimpaired fasting glucose check fbs,a1c and ratio  Chief Complaint  Chief Complaint Free Text Note Form: Follow up      Advance Directives  Advance Directive [facility]:   The patient is not in agreement to receive the Advance Care Planning service--   NO - Patient does not have an advance health care directive  Capacity/Competence:  This patient has full decision making capacity for discussion of advance care planning-- and-- This patient has full decision making competency for discussion of advance care planning  Summary of Advance Directive Conversation  he has not made out a living will or formally named a health care proxy  He would like his daughter Eloy Shaffer and his son Raad Hill to be his proxy  The provider spent 10 minutes discussing Advance Directives  History of Present Illness  HPI: Patient presents in follow up for his recent xray of the hands  He complains of a 2 yr hx of a pain in the skull which is midline and radiates down to his neck on the left side He says he feels it when he presses on the site and when he wears a hat  He says the pain is 4-8/10  He hasnât taken anything for the pain  Review of Systems  Complete-Male:  Constitutional: No fever or chills, feels well, no tiredness, no recent weight gain or weight loss  Eyes: No complaints of eye pain, no red eyes, no discharge from eyes, no itchy eyes  ENT: no complaints of earache, no hearing loss, no nosebleeds, no nasal discharge, no sore throat, no hoarseness  Cardiovascular: No complaints of slow heart rate, no fast heart rate, no chest pain, no palpitations, no leg claudication, no lower extremity  Respiratory: No complaints of shortness of breath, no wheezing, no cough, no SOB on exertion, no orthopnea or PND  Gastrointestinal: No complaints of abdominal pain, no constipation, no nausea or vomiting, no diarrhea or bloody stools  Genitourinary: No complaints of dysuria, no incontinence, no hesitancy, no nocturia, no genital lesion, no testicular pain  Musculoskeletal: as noted in HPI  Integumentary: No complaints of skin rash or skin lesions, no itching, no skin wound, no dry skin  Neurological: as noted in HPI  Psychiatric: Is not suicidal, no sleep disturbances, no anxiety or depression, no change in personality, no emotional problems    Endocrine: No complaints of proptosis, no hot flashes, no muscle weakness, no erectile dysfunction, no deepening of the voice, no feelings of weakness  Hematologic/Lymphatic: No complaints of swollen glands, no swollen glands in the neck, does not bleed easily, no easy bruising  ROS Reviewed:   ROS reviewed  Active Problems    1  Abnormal CT scan, gastrointestinal tract (793 4) (R93 3)   2  Abnormal loss of weight (783 21) (R63 4)   3  Atherosclerosis of coronary artery (414 00) (I25 10)   4  Atherosclerosis of coronary artery (414 00) (I25 10)   5  Back pain (724 5) (M54 9)   6  Benign localized prostatic hyperplasia with lower urinary tract symptoms (LUTS) (600 21,599 69) (N40 1)   7  Bilateral impacted cerumen (380 4) (H61 23)   8  Blood in stool (578 1) (K92 1)   9  CABG   10  Carotid artery stenosis (433 10) (I65 29)   11  Carotid bruit (785 9) (R09 89)   12  Cataract (366 9) (H26 9)   13  Cervical radiculopathy (723 4) (M54 12)   14  Chest discomfort (786 59) (R07 89)   15  Chronic left shoulder pain (719 41,338 29) (M25 512,G89 29)   16  Chronic obstructive pulmonary disease (496) (J44 9)   17  Constipation (564 00) (K59 00)   18  Diarrhea (787 91) (R19 7)   19  Dizziness (780 4) (R42)   20  Emphysema (492 8) (J43 9)   21  Encounter for screening for malignant neoplasm of colon (V76 51) (Z12 11)   22  Esophageal reflux (530 81) (K21 9)   23  Essential hypertension (401 9) (I10)   24  Flu vaccine need (V04 81) (Z23)   25  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)   26  Heart disease (429 9) (I51 9)   27  Hyperlipidemia (272 4) (E78 5)   28  Impaired fasting glucose (790 21) (R73 01)   29  Inguinal hernia (550 90) (K40 90)   30  Joint pain in fingers of left hand (719 44) (M25 542)   31  Left flank pain (789 09) (R10 9)   32  Memory loss (780 93) (R41 3)   33  Need for prophylactic vaccination against Streptococcus pneumoniae (pneumococcus) (V03 82)  (Z23)   34  Need for revaccination (V05 9) (Z23)   35  Need for vaccination for DTP (V06 1) (Z23)   36  Nephrolithiasis (592 0) (N20 0)   37   Occlusion and stenosis of unspecified carotid artery (433 10) (I65 29)   38  Osteoarthritis of hip (715 95) (M16 9)   39  Pain in the abdomen (789 00) (R10 9)   40  Plantar warts (078 12) (B07 0)   41  Pre-operative cardiovascular examination (V72 81) (Z01 810)   42  Preoperative clearance (V72 84) (Z01 818)   43  PVD (peripheral vascular disease) (443 9) (I73 9)   44  Renal colic (535 5) (O25)   45  Right foot pain (729 5) (M79 671)   46  Screening for genitourinary condition (V81 6) (Z13 89)   47  Shortness of breath (786 05) (R06 02)   48  Special screening for malignant neoplasm of prostate (V76 44) (Z12 5)   49  Strain of neck muscle, initial encounter (847 0) (S16 1XXA)   50  Subjective tinnitus of left ear (388 31) (H93 12)   51  Trigger finger, left (727 03) (M65 30)    Past Medical History  1  History of Coronary Artery Disease (V12 59)   2  Denied: History of depression   3  History of essential hypertension (V12 59) (Z86 79)   4  History of hyperlipidemia (V12 29) (Z86 39)   5  Denied: History of substance abuse   6  Need for vaccination for DTP (V06 1) (Z23)   7  History of Renal lesion (593 9) (N28 9)  Active Problems And Past Medical History Reviewed: The active problems and past medical history were reviewed and updated today  Surgical History  1  CABG   2  History of Coronary Artery Surgery   3  History of Endarterectomy Internal Carotid   4  History of Hernia Repair   5  History of Hip Replacement   6  History of Renal Lithotripsy  Surgical History Reviewed: The surgical history was reviewed and updated today  Family History  Mother    1  Denied: Family history of Depression with anxiety   2  Denied: Family history of substance abuse   3  Family history of Nephrolithiasis  Father    4  Denied: Family history of Depression with anxiety   5  Denied: Family history of substance abuse   6  Family history of Stroke Syndrome (V17 1)  Family History Reviewed:    The family history was reviewed and updated today  Social History     · Being A Social Drinker   · Denied: History of Drug use   · Former smoker (V15 82) (K61 996)   · Marital History -   Social History Reviewed: The social history was reviewed and updated today  Current Meds   1  Aspirin EC 81 MG Oral Tablet Delayed Release; 2 tabs daily; Therapy: (Recorded:05Jun2015) to Recorded   2  Donepezil HCl - 5 MG Oral Tablet; TAKE 1 TABLET Bedtime; Therapy: 27YSC3028 to (Evaluate:22Apr2017)  Requested for: 24Oct2016; Last Rx:24Oct2016 Ordered   3  Meloxicam 15 MG Oral Tablet; TAKE 1 TABLET DAILY WITH FOOD; Therapy: 65DWA8134 to (Evaluate:16Mar2017)  Requested for: 91BVB3009; Last Rx:23Jan2017 Ordered   4  Metoprolol Succinate ER 50 MG Oral Tablet Extended Release 24 Hour; TAKE 1 TABLET DAILY  Requested for: 96Gwd3469; Last Rx:35Odr4428 Ordered   5  Multivitamins Oral Capsule; TAKE 1 CAPSULE DAILY; Therapy: (Guillaume Dior) to Recorded   6  Nitroglycerin 0 4 MG Sublingual Tablet Sublingual; TAKE AS DIRECTED; Therapy: 47JDO0649 to (Last Rx:18Oct2017)  Requested for: 88CQI3480 Ordered   7  Prevnar 13 Intramuscular Suspension (Prevnar 13 Intramuscular Suspension); inject as directed; Therapy: 72SCY6327 to (Last Rx:12Nov2015)  Requested for: 57QJA7566 Ordered   8  Simvastatin 40 MG Oral Tablet; take 1 tablet daily at bedtime  Requested for: 79Rov2766; Last Rx:23Sps9008 Ordered   9  Tamsulosin HCl - 0 4 MG Oral Capsule; take 1 capsule daily; Therapy: 24Apr2014 to (Complete:52Piw8996)  Requested for: 37Ddj0305; Last Rx:34Mam8066 Ordered   10  Vitamin C TABS; TAKE 1 TABLET DAILY; Therapy: (UVJLXIAQ:78WBG4821) to Recorded  Medication List Reviewed: The medication list was reviewed and updated today  Allergies  1   No Known Drug Allergies    Vitals  Vital Signs    Recorded: 72WFR2774 11:30AM   Heart Rate 76   Systolic 588   Diastolic 64   Height 5 ft 8 in   Weight 166 lb 4 oz   BMI Calculated 25 28   BSA Calculated 1 89   O2 Saturation 96 Physical Exam   Constitutional  General appearance: No acute distress, well appearing and well nourished  Eyes  Conjunctiva and lids: No swelling, erythema, or discharge  Pupils and irises: Equal, round and reactive to light  Ears, Nose, Mouth, and Throat  External inspection of ears and nose: Normal    Otoscopic examination: Tympanic membrance translucent with normal light reflex  Canals patent without erythema  Nasal mucosa, septum, and turbinates: Normal without edema or erythema  Oropharynx: Normal with no erythema, edema, exudate or lesions  Pulmonary  Respiratory effort: No increased work of breathing or signs of respiratory distress  Auscultation of lungs: Clear to auscultation, equal breath sounds bilaterally, no wheezes, no rales, no rhonci  Cardiovascular  Palpation of heart: Normal PMI, no thrills  Auscultation of heart: Normal rate and rhythm, normal S1 and S2, without murmurs  Examination of extremities for edema and/or varicosities: Normal    Carotid pulses: Normal    Abdomen  Abdomen: Non-tender, no masses  Liver and spleen: No hepatomegaly or splenomegaly  Lymphatic  Palpation of lymph nodes in neck: No lymphadenopathy  Musculoskeletal  Gait and station: Normal    Digits and nails: Normal without clubbing or cyanosis  Inspection/palpation of joints, bones, and muscles: Abnormal  -- tenderness in the proximal jts of both hands and the wrist on the right  No tendernes in the cervical spine  Skin  Skin and subcutaneous tissue: Normal without rashes or lesions  Neurologic  Cranial nerves: Cranial nerves 2-12 intact  Reflexes: 2+ and symmetric  Sensation: No sensory loss  Psychiatric  Orientation to person, place and time: Normal    Mood and affect: Normal    Additional Exam:  There is tenderness in the skull left of midline with palpation  Health Management  Blood in stool   COLONOSCOPY; every 10 years; Last 01Pil4692; Next Due: 40Hwh7423;  Active  Health Maintenance   *VB - Fall Risk Assessment  (Dx Z13 89 Screen for Neurologic Disorder); every 1 year; [id number]; Next Due: 94FDY6427; Overdue    Future Appointments    Date/Time Provider Specialty Site   01/04/2018 10:20 AM TIFFANI Lu   Cardiology [address]       Signatures   Electronically signed by : TIFFANI Mccloud ; Nov 14 2017 12:14PM EST                       (Author)

## 2017-12-29 ENCOUNTER — TRANSCRIBE ORDERS (OUTPATIENT)
Dept: ADMINISTRATIVE | Facility: HOSPITAL | Age: 77
End: 2017-12-29

## 2017-12-29 ENCOUNTER — ALLSCRIPTS OFFICE VISIT (OUTPATIENT)
Dept: OTHER | Facility: OTHER | Age: 77
End: 2017-12-29

## 2017-12-29 DIAGNOSIS — J32.3 CHRONIC SPHENOIDAL SINUSITIS: Primary | ICD-10-CM

## 2017-12-29 DIAGNOSIS — J32.3 CHRONIC SPHENOIDAL SINUSITIS: ICD-10-CM

## 2017-12-29 DIAGNOSIS — M43.02 SPONDYLOLYSIS OF CERVICAL REGION: ICD-10-CM

## 2018-01-08 ENCOUNTER — APPOINTMENT (OUTPATIENT)
Dept: VASCULAR SURGERY | Facility: CLINIC | Age: 78
End: 2018-01-08
Payer: MEDICARE

## 2018-01-08 VITALS — SYSTOLIC BLOOD PRESSURE: 125 MMHG | OXYGEN SATURATION: 97 % | DIASTOLIC BLOOD PRESSURE: 68 MMHG | HEART RATE: 74 BPM

## 2018-01-08 DIAGNOSIS — I77.9 DISORDER OF ARTERIES AND ARTERIOLES, UNSPECIFIED: ICD-10-CM

## 2018-01-08 PROCEDURE — 99214 OFFICE O/P EST MOD 30 MIN: CPT | Mod: 25

## 2018-01-08 PROCEDURE — 93880 EXTRACRANIAL BILAT STUDY: CPT

## 2018-01-10 NOTE — RESULT NOTES
Verified Results  VAS LOWER LIMB ARTERIAL DUPLEX, COMPLETE BILATERAL/GRAFTS 30HBC5851 01:25PM Dayanna Delacruz Order Number: [id number]    - Patient Instructions: To schedule this appointment, please contact Central Scheduling at [phone]  Test Name Result Flag Reference   VAS LOWER LIMB ARTERIAL DUPLEX, COMPLETE BILATERAL/GRAFTS (Report)     [facility] REPORT   CLINICAL:   Indications: PVD, Unspecified [I73 9]  Patient reports leg pain while laying in bed that has been resolving s/p right   hip surgery  Denies rest pain  Risk Factors: The patient has history of HTN, hyperlipidemia and CAD  Clinical   Right Pressure: 118/ mm Hg, Left Pressure: 127/ mm Hg  FINDINGS:      Segment        Rig    Left                        PSV EDV PSV EDV    Common Femoral Artery 125  8 135  0    Prox Profunda     107  0 217  0    Prox SFA        85  0 122  16    Mid SFA        101  0 114       Dist SFA        109  0  94  10    Proximal Pop      63  0  80  0    Distal Pop       76  0  71  6    Tibioperoneal      43            Dist Post Tibial    78  0         Dist  Ant  Tibial         72  0    Dist Peroneal      69  20  79                CONCLUSION:   Impression:   RIGHT LOWER LIMB:   This resting evaluation shows no evidence of significant lower extremity   arterial occlusive disease  Ankle/Brachial index: 10 9 (Normal)   Metatarsal pressure of 116 mmHg   Great toe pressure of 87 mmHg, within the healing range   LEFT LOWER LIMB:   This resting evaluation shows no evidence of significant lower extremity   arterial occlusive disease     Ankle/Brachial index: 1 03 (Normal)   Metatarsal pressure of 178 mmHg   Great toe pressure of 80 mmHg, within the healing range      SIGNATURE:   Electronically Signed by: Betty Hennessy on 2016-11-25 05:47:33 PM

## 2018-01-10 NOTE — PROGRESS NOTES
Assessment  Assessed    1  Atherosclerosis of coronary artery (414 00) (I25 10)   2  Carotid artery stenosis (433 10) (I65 29)   3  Hyperlipidemia (272 4) (E78 5)   4  Pre-operative cardiovascular examination (V72 81) (Z01 810)    Discussion/Summary  Cardiology Discussion Summary Free Text Note Form [facility]:   Patient with multiple medical problems who seems to be doing reasonably well from cardiac standpoint  Previous studies reviewed with patient, medications reviewed and possible side effects discussed  Continue concepts of atherosclerosis, signs and symptoms of cardiac disease  risk factor modification  All questions answered  Safety measures reviewed  Patient advised to report any problems prompting medical attention  Patient has no specific contraindication from cardiac standpoint to proceed with carotid surgery  He presents moderate risk based on advanced age and comorbidities  Results of stress test and echocardiogram reviewed with patient as well as faxed to the surgeon in [place]  Patient had a few questions which were answered  Follow-up in a few months  Medications reviewed  Follow-up with primary care physician  Counseling Documentation With Imm: The patient was counseled regarding instructions for management, impressions, risks and benefits of treatment options  Chief Complaint  Chief Complaint Chronic Condition [facility]: Patient is here today for follow up of chronic conditions described in HPI  History of Present Illness  Cardiology HPI Free Text Note Form [facility]: Patient presents for follow-up visit  Patient also for preoperative risk assessment for carotid surgery next week  Patient denies any chest pain, no shortness of breath  No history of leg edema, orthopnea, PND  No history of presyncope, syncope  He states that he has been compliant with all his present medications        Review of Systems  Cardiology Male ROS:     Cardiac: No complaints of chest pain, no palpitations, no fainiting  Skin: No complaints of nonhealing sores or skin rash  Genitourinary: No complaints of recurrent urinary tract infections, frequent urination at night, difficult urination, blood in urine, kidney stones, loss of bladder control, no kidney or prostate problems, no erectile dysfunction  Psychological: No complaints of feeling depressed, anxiety, panic attacks, or difficulty concentrating  General: No complaints of trouble sleeping, lack of energy, fatigue, appetite changes, weight changes, fever, frequent infections, or night sweats  Respiratory: No complaints of shortness of breath, cough with sputum, or wheezing  HEENT: No complaints of serious problems, hearing problems, nose problems, throat problems, or snoring  Gastrointestinal: No complaints of liver problems, nausea, vomiting, heartburn, constipation, bloody stools, diarrhea, problems swallowing, adbominal pain, or rectal bleeding  Hematologic: No complaints of bleeding disorders, anemia, blood clots, or excessive brusing  Neurological: No complaints of numbness, tingling, dizziness, weakness, seizures, headaches, syncope or fainting, AM fatigue, daytime sleepiness, no witnessed apnea episodes  Musculoskeletal: No complaints of arthritis, back pain, or painfull swelling  ROS Reviewed:   ROS reviewed  Active Problems  Problems    1  Abnormal CT scan, gastrointestinal tract (793 4) (R93 3)   2  Abnormal loss of weight (783 21) (R63 4)   3  Atherosclerosis of coronary artery (414 00) (I25 10)   4  Atherosclerosis of coronary artery (414 00) (I25 10)   5  Back pain (724 5) (M54 9)   6  Benign localized prostatic hyperplasia with lower urinary tract symptoms (LUTS)   (600 21,599 69) (N40 1)   7  Bilateral impacted cerumen (380 4) (H61 23)   8  Blood in stool (578 1) (K92 1)   9  CABG   10  Carotid artery stenosis (433 10) (I65 29)   11  Carotid bruit (785 9) (R09 89)   12   Cataract (366 9) (H26 9)   13  Cervical radiculopathy (723 4) (M54 12)   14  Chronic left shoulder pain (719 41,338 29) (M25 512,G89 29)   15  Chronic obstructive pulmonary disease (496) (J44 9)   16  Constipation (564 00) (K59 00)   17  Diarrhea (787 91) (R19 7)   18  Emphysema (492 8) (J43 9)   19  Encounter for screening for malignant neoplasm of colon (V76 51) (Z12 11)   20  Esophageal reflux (530 81) (K21 9)   21  Essential hypertension (401 9) (I10)   22  Flu vaccine need (V04 81) (Z23)   23  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)   24  Heart disease (429 9) (I51 9)   25  Hyperlipidemia (272 4) (E78 5)   26  Impaired fasting glucose (790 21) (R73 01)   27  Inguinal hernia (550 90) (K40 90)   28  Left flank pain (789 09) (R10 9)   29  Memory loss (780 93) (R41 3)   30  Need for prophylactic vaccination against Streptococcus pneumoniae (pneumococcus)    (V03 82) (Z23)   31  Need for revaccination (V05 9) (Z23)   32  Need for vaccination for DTP (V06 1) (Z23)   33  Nephrolithiasis (592 0) (N20 0)   34  Occlusion and stenosis of unspecified carotid artery (433 10) (I65 29)   35  Osteoarthritis of hip (715 95) (M16 9)   36  Pain in the abdomen (789 00) (R10 9)   37  Plantar warts (078 12) (B07 0)   38  Pre-operative cardiovascular examination (V72 81) (Z01 810)   39  Preoperative clearance (V72 84) (Z01 818)   40  PVD (peripheral vascular disease) (443 9) (I73 9)   41  Renal colic (485 5) (N53)   42  Right foot pain (729 5) (M79 671)   43  Shortness of breath (786 05) (R06 02)   44  Special screening for malignant neoplasm of prostate (V76 44) (Z12 5)   45  Strain of neck muscle, initial encounter (847 0) (S16 1XXA)   46  Subjective tinnitus of left ear (388 31) (H93 12)    Past Medical History  Problems    1  History of Coronary Artery Disease (V12 59)   2  History of essential hypertension (V12 59) (Z86 79)   3  History of hyperlipidemia (V12 29) (Z86 39)   4  Need for vaccination for DTP (V06 1) (Z23)   5   History of Renal lesion (593 9) (N28 9)  Active Problems And Past Medical History Reviewed: The active problems and past medical history were reviewed and updated today  Surgical History  Problems    1  CABG   2  History of Coronary Artery Surgery   3  History of Hernia Repair   4  History of Hip Replacement   5  History of Renal Lithotripsy  Surgical History Reviewed: The surgical history was reviewed and updated today  Family History  Mother    1  Family history of Nephrolithiasis  Father    2  Family history of Stroke Syndrome (V17 1)  Family History Reviewed: The family history was reviewed and updated today  Social History  Problems    · Being A Social Drinker   · Former smoker (S05 77) (T26 263)   · Marital History -   Social History Reviewed: The social history was reviewed and updated today  Current Meds   1  Aspirin EC 81 MG Oral Tablet Delayed Release; 2 tabs daily; Therapy: (Recorded:05Jun2015) to Recorded   2  Donepezil HCl - 5 MG Oral Tablet; TAKE 1 TABLET Bedtime; Therapy: 32WKA4151 to (Evaluate:22Apr2017)  Requested for: 09Umw5165; Last   Rx:21Rnj6248 Ordered   3  Meloxicam 15 MG Oral Tablet; TAKE 1 TABLET DAILY WITH FOOD; Therapy: 34XMB7603 to (Evaluate:16Mar2017)  Requested for: 82WKH7289; Last   Rx:23Jan2017 Ordered   4  Metoprolol Succinate ER 50 MG Oral Tablet Extended Release 24 Hour; TAKE 1 TABLET   DAILY  Requested for: 99ZBE8431; Last Rx:53Bhi6893 Ordered   5  Multivitamins Oral Capsule; TAKE 1 CAPSULE DAILY; Therapy: (Liu Fernandez) to Recorded   6  Prevnar 13 Intramuscular Suspension; inject as directed; Therapy: 13OSL1213 to (Last Rx:12Nov2015)  Requested for: 67AYV5144 Ordered   7  Simvastatin 40 MG Oral Tablet; TAKE 1 TABLET DAILY AT BEDTIME  Requested for:   96BMW1277; Last Rx:14Gqj9133 Ordered   8  Tamsulosin HCl - 0 4 MG Oral Capsule; take 1 capsule daily; Therapy: 21Uln2774 to ([address] [place] [address])  Requested for: 63YEM8286; Last   Rx:60Piv6656 Ordered   9   Vitamin C TABS; TAKE 1 TABLET DAILY; Therapy: (MRPRWPUP:65SNN2168) to Recorded  Medication List Reviewed: The medication list was reviewed and updated today  Allergies  Medication    1  No Known Drug Allergies    Vitals  Vital Signs    Recorded: 65BUL6850 08:46AM   Heart Rate 78   Systolic 547   Diastolic 62   Height 5 ft 7 in   Weight 157 lb    BMI Calculated 24 59   BSA Calculated 1 82   O2 Saturation 97     Physical Exam    Constitutional   General appearance: No acute distress, well appearing and well nourished  Eyes   Conjunctiva and Sclera examination: Conjunctiva pink, sclera anicteric  Ears, Nose, Mouth, and Throat - Oropharynx: Clear, nares are clear, mucous membranes are moist    Neck   Neck and thyroid: Normal, supple, trachea midline, no thyromegaly  Pulmonary   Respiratory effort: No increased work of breathing or signs of respiratory distress  Auscultation of lungs: Clear to auscultation, no rales, no rhonchi, no wheezing, good air movement  Cardiovascular   Auscultation of heart: Normal rate and rhythm, normal S1 and S2, no murmurs  Carotid pulses: Abnormal   Carotid bruit  Peripheral vascular exam: Normal pulses throughout, no tenderness, erythema or swelling  Pedal pulses: Normal, 2+ bilaterally  Examination of extremities for edema and/or varicosities: Normal     Abdomen   Abdomen: Non-tender and no distention  Liver and spleen: No hepatomegaly or splenomegaly  Musculoskeletal Gait and station: Normal gait  Digits and nails: Normal without clubbing or cyanosis  Inspection/palpation of joints, bones, and muscles: Normal, ROM normal     Skin - Skin and subcutaneous tissue: Normal without rashes or lesions  Skin is warm and well perfused, normal turgor  Neurologic - Cranial nerves: II - XII intact  Speech: Normal     Psychiatric - Orientation to person, place, and time: Normal  Mood and affect: Normal       Results/Data  Diagnostic Studies Reviewed Cardio:  I personally reviewed the recording/images in the office today  My interpretation follows  Stress Test: Nuclear stress test  Reasonable functional aerobic capacity for age  No evidence of ischemia  Ejection fraction normal    Echocardiogram/YESIKA: Normal ejection fraction  No significant valvular abnormalities noted  Health Management  Blood in stool   COLONOSCOPY; every 10 years; Last 94Jyf7370; Next Due: 75Ouu7456; Active  Health Maintenance   *VB - Fall Risk Assessment  (Dx Z13 89 Screen for Neurologic Disorder); every 1 year; Last  61DRV4860; Next Due: 36WLP9514; Overdue    Future Appointments    Date/Time Provider Specialty Site   05/08/2017 02:00 PM TIFFANI Holbrook   Internal Medicine [facility]     Signatures   Electronically signed by : TIFFANI Sheets ; Mar 10 2017  8:34PM EST                       (Author)

## 2018-01-10 NOTE — MISCELLANEOUS
This is to state that this patient has no specific contraindication from cardiac standpoint for carotid surgery  Patient presents moderate risk based on age and comorbidities  Patient did have a nuclear stress test  which was negative for ischemia with normal ejection fraction of 66%  Echocardiogram showed normal ejection fraction and no significant valvular abnormalities noted  If you have any specific questions, please do not hesitate to contact me in person  The  results of stress test and echocardiogram have been faxed to your attention  Electronically signed by:Kira ROQUE    Mar 10 2017 11:01AM EST

## 2018-01-12 VITALS
WEIGHT: 157 LBS | DIASTOLIC BLOOD PRESSURE: 62 MMHG | BODY MASS INDEX: 24.64 KG/M2 | HEART RATE: 78 BPM | OXYGEN SATURATION: 97 % | SYSTOLIC BLOOD PRESSURE: 138 MMHG | HEIGHT: 67 IN

## 2018-01-12 VITALS
HEART RATE: 76 BPM | OXYGEN SATURATION: 96 % | DIASTOLIC BLOOD PRESSURE: 64 MMHG | SYSTOLIC BLOOD PRESSURE: 126 MMHG | BODY MASS INDEX: 25.2 KG/M2 | WEIGHT: 166.25 LBS | HEIGHT: 68 IN

## 2018-01-13 VITALS
OXYGEN SATURATION: 97 % | SYSTOLIC BLOOD PRESSURE: 132 MMHG | HEIGHT: 68 IN | BODY MASS INDEX: 24.25 KG/M2 | WEIGHT: 160 LBS | DIASTOLIC BLOOD PRESSURE: 60 MMHG | HEART RATE: 70 BPM

## 2018-01-13 VITALS
WEIGHT: 162.25 LBS | HEART RATE: 79 BPM | BODY MASS INDEX: 25.47 KG/M2 | SYSTOLIC BLOOD PRESSURE: 124 MMHG | DIASTOLIC BLOOD PRESSURE: 66 MMHG | OXYGEN SATURATION: 95 % | TEMPERATURE: 97.3 F | HEIGHT: 67 IN

## 2018-01-13 VITALS
BODY MASS INDEX: 23.64 KG/M2 | SYSTOLIC BLOOD PRESSURE: 110 MMHG | HEART RATE: 80 BPM | HEIGHT: 68 IN | DIASTOLIC BLOOD PRESSURE: 60 MMHG | WEIGHT: 156 LBS

## 2018-01-15 ENCOUNTER — HOSPITAL ENCOUNTER (OUTPATIENT)
Dept: MRI IMAGING | Facility: HOSPITAL | Age: 78
Discharge: HOME | End: 2018-01-15
Attending: PSYCHIATRY & NEUROLOGY
Payer: MEDICARE

## 2018-01-15 ENCOUNTER — LAB (OUTPATIENT)
Dept: LAB | Facility: HOSPITAL | Age: 78
End: 2018-01-15
Attending: PSYCHIATRY & NEUROLOGY
Payer: MEDICARE

## 2018-01-15 VITALS
WEIGHT: 162.25 LBS | DIASTOLIC BLOOD PRESSURE: 64 MMHG | SYSTOLIC BLOOD PRESSURE: 120 MMHG | HEIGHT: 67 IN | HEART RATE: 78 BPM | BODY MASS INDEX: 25.47 KG/M2

## 2018-01-15 VITALS
HEIGHT: 68 IN | SYSTOLIC BLOOD PRESSURE: 138 MMHG | WEIGHT: 157.25 LBS | BODY MASS INDEX: 23.83 KG/M2 | TEMPERATURE: 97.9 F | DIASTOLIC BLOOD PRESSURE: 66 MMHG | HEART RATE: 73 BPM | OXYGEN SATURATION: 95 %

## 2018-01-15 DIAGNOSIS — J32.3 CHRONIC SPHENOIDAL SINUSITIS: ICD-10-CM

## 2018-01-15 LAB — ERYTHROCYTE [SEDIMENTATION RATE] IN BLOOD: 4 MM/HOUR (ref 0–10)

## 2018-01-15 PROCEDURE — 36415 COLL VENOUS BLD VENIPUNCTURE: CPT

## 2018-01-15 PROCEDURE — 85652 RBC SED RATE AUTOMATED: CPT

## 2018-01-15 PROCEDURE — 70551 MRI BRAIN STEM W/O DYE: CPT

## 2018-01-23 VITALS
DIASTOLIC BLOOD PRESSURE: 66 MMHG | HEIGHT: 67 IN | BODY MASS INDEX: 26.37 KG/M2 | HEART RATE: 82 BPM | WEIGHT: 168 LBS | SYSTOLIC BLOOD PRESSURE: 126 MMHG

## 2018-02-05 ENCOUNTER — APPOINTMENT (OUTPATIENT)
Dept: LAB | Facility: CLINIC | Age: 78
End: 2018-02-05
Payer: MEDICARE

## 2018-02-05 ENCOUNTER — OFFICE VISIT (OUTPATIENT)
Dept: INTERNAL MEDICINE CLINIC | Facility: CLINIC | Age: 78
End: 2018-02-05
Payer: MEDICARE

## 2018-02-05 VITALS
DIASTOLIC BLOOD PRESSURE: 60 MMHG | SYSTOLIC BLOOD PRESSURE: 123 MMHG | WEIGHT: 168.8 LBS | HEART RATE: 78 BPM | HEIGHT: 67 IN | OXYGEN SATURATION: 97 % | BODY MASS INDEX: 26.49 KG/M2

## 2018-02-05 DIAGNOSIS — M25.542 ARTHRALGIA OF LEFT HAND: ICD-10-CM

## 2018-02-05 DIAGNOSIS — R73.01 IMPAIRED FASTING GLUCOSE: ICD-10-CM

## 2018-02-05 DIAGNOSIS — E78.00 HYPERCHOLESTEREMIA: ICD-10-CM

## 2018-02-05 DIAGNOSIS — M79.642 PAIN OF LEFT HAND: ICD-10-CM

## 2018-02-05 DIAGNOSIS — J32.0 SINUSITIS, MAXILLARY, CHRONIC: ICD-10-CM

## 2018-02-05 DIAGNOSIS — N40.0 BENIGN PROSTATIC HYPERPLASIA WITHOUT LOWER URINARY TRACT SYMPTOMS: Primary | ICD-10-CM

## 2018-02-05 DIAGNOSIS — M79.641 PAIN OF RIGHT HAND: ICD-10-CM

## 2018-02-05 DIAGNOSIS — I10 ESSENTIAL (PRIMARY) HYPERTENSION: ICD-10-CM

## 2018-02-05 DIAGNOSIS — E78.5 HYPERLIPIDEMIA: ICD-10-CM

## 2018-02-05 DIAGNOSIS — R51.9 ACUTE NONINTRACTABLE HEADACHE, UNSPECIFIED HEADACHE TYPE: ICD-10-CM

## 2018-02-05 DIAGNOSIS — J44.9 CHRONIC OBSTRUCTIVE PULMONARY DISEASE, UNSPECIFIED COPD TYPE (HCC): ICD-10-CM

## 2018-02-05 LAB
ALBUMIN SERPL BCP-MCNC: 4.1 G/DL (ref 3.5–5)
ALP SERPL-CCNC: 90 U/L (ref 46–116)
ALT SERPL W P-5'-P-CCNC: 21 U/L (ref 12–78)
ANION GAP SERPL CALCULATED.3IONS-SCNC: 7 MMOL/L (ref 4–13)
AST SERPL W P-5'-P-CCNC: 23 U/L (ref 5–45)
BILIRUB SERPL-MCNC: 0.58 MG/DL (ref 0.2–1)
BUN SERPL-MCNC: 17 MG/DL (ref 5–25)
CALCIUM SERPL-MCNC: 9 MG/DL (ref 8.3–10.1)
CHLORIDE SERPL-SCNC: 106 MMOL/L (ref 100–108)
CHOLEST SERPL-MCNC: 129 MG/DL (ref 50–200)
CK SERPL-CCNC: 106 U/L (ref 39–308)
CO2 SERPL-SCNC: 27 MMOL/L (ref 21–32)
CREAT SERPL-MCNC: 0.92 MG/DL (ref 0.6–1.3)
CREAT UR-MCNC: 153 MG/DL
CRP SERPL QL: <3 MG/L
EST. AVERAGE GLUCOSE BLD GHB EST-MCNC: 123 MG/DL
GFR SERPL CREATININE-BSD FRML MDRD: 79 ML/MIN/1.73SQ M
GLUCOSE P FAST SERPL-MCNC: 86 MG/DL (ref 65–99)
HBA1C MFR BLD: 5.9 % (ref 4.2–6.3)
HDLC SERPL-MCNC: 46 MG/DL (ref 40–60)
LDLC SERPL CALC-MCNC: 51 MG/DL (ref 0–100)
MICROALBUMIN UR-MCNC: 63.5 MG/L (ref 0–20)
MICROALBUMIN/CREAT 24H UR: 42 MG/G CREATININE (ref 0–30)
POTASSIUM SERPL-SCNC: 4.4 MMOL/L (ref 3.5–5.3)
PROT SERPL-MCNC: 7.4 G/DL (ref 6.4–8.2)
SODIUM SERPL-SCNC: 140 MMOL/L (ref 136–145)
TRIGL SERPL-MCNC: 162 MG/DL

## 2018-02-05 PROCEDURE — 86200 CCP ANTIBODY: CPT

## 2018-02-05 PROCEDURE — 80061 LIPID PANEL: CPT

## 2018-02-05 PROCEDURE — 80053 COMPREHEN METABOLIC PANEL: CPT

## 2018-02-05 PROCEDURE — 83036 HEMOGLOBIN GLYCOSYLATED A1C: CPT

## 2018-02-05 PROCEDURE — 82570 ASSAY OF URINE CREATININE: CPT

## 2018-02-05 PROCEDURE — 82550 ASSAY OF CK (CPK): CPT

## 2018-02-05 PROCEDURE — 99215 OFFICE O/P EST HI 40 MIN: CPT | Performed by: INTERNAL MEDICINE

## 2018-02-05 PROCEDURE — 86140 C-REACTIVE PROTEIN: CPT

## 2018-02-05 PROCEDURE — 86618 LYME DISEASE ANTIBODY: CPT

## 2018-02-05 PROCEDURE — 86038 ANTINUCLEAR ANTIBODIES: CPT

## 2018-02-05 PROCEDURE — 82043 UR ALBUMIN QUANTITATIVE: CPT

## 2018-02-05 PROCEDURE — 86430 RHEUMATOID FACTOR TEST QUAL: CPT

## 2018-02-05 RX ORDER — NITROGLYCERIN 0.4 MG/1
0.4 TABLET SUBLINGUAL AS NEEDED
COMMUNITY
Start: 2017-10-18

## 2018-02-05 RX ORDER — METOPROLOL SUCCINATE 50 MG/1
1 TABLET, EXTENDED RELEASE ORAL DAILY
COMMUNITY
End: 2018-04-26 | Stop reason: SDUPTHER

## 2018-02-05 RX ORDER — CEFUROXIME AXETIL 500 MG/1
500 TABLET ORAL EVERY 12 HOURS SCHEDULED
Qty: 28 TABLET | Refills: 0 | Status: SHIPPED | OUTPATIENT
Start: 2018-02-05 | End: 2018-02-19

## 2018-02-05 RX ORDER — SIMVASTATIN 40 MG
1 TABLET ORAL
COMMUNITY
End: 2018-04-26 | Stop reason: SDUPTHER

## 2018-02-05 RX ORDER — ASPIRIN 81 MG/1
2 TABLET ORAL DAILY
COMMUNITY

## 2018-02-05 RX ORDER — RIBOFLAVIN (VITAMIN B2) 100 MG
1 TABLET ORAL DAILY
COMMUNITY

## 2018-02-05 RX ORDER — FLUTICASONE PROPIONATE 50 MCG
2 SPRAY, SUSPENSION (ML) NASAL DAILY
Qty: 16 G | Refills: 1 | Status: SHIPPED | OUTPATIENT
Start: 2018-02-05 | End: 2018-06-05 | Stop reason: SDUPTHER

## 2018-02-05 RX ORDER — TAMSULOSIN HYDROCHLORIDE 0.4 MG/1
0.4 CAPSULE ORAL DAILY
Qty: 30 CAPSULE | Refills: 6 | Status: SHIPPED | OUTPATIENT
Start: 2018-02-05 | End: 2018-04-26 | Stop reason: SDUPTHER

## 2018-02-05 RX ORDER — TAMSULOSIN HYDROCHLORIDE 0.4 MG/1
1 CAPSULE ORAL DAILY
COMMUNITY
Start: 2014-04-24 | End: 2018-02-05 | Stop reason: SDUPTHER

## 2018-02-05 NOTE — ASSESSMENT & PLAN NOTE
I had ordered labs at last visit but he didn't do them  He will do them today  mri is benign   Follow with Dr Corcoran Eagle

## 2018-02-05 NOTE — PROGRESS NOTES
Assessment/Plan:    Chronic obstructive pulmonary disease ([facility] 75 )  Stable and he is using his inhalers    Benign prostatic hyperplasia without lower urinary tract symptoms  tamsulosin reordered    Hypercholesteremia  Check ck lfts and lipids    Acute nonintractable headache  I had ordered labs at last visit but he didn't do them  He will do them today  mri is benign  Follow with Dr Wu Grossman    Sinusitis, maxillary, chronic  He is tender in the right maxillary sinus and has postnasal drip  Will start on flonase and ceftin  Diagnoses and all orders for this visit:    Benign prostatic hyperplasia without lower urinary tract symptoms  -     tamsulosin (FLOMAX) 0 4 mg; Take 1 capsule (0 4 mg total) by mouth daily    Chronic obstructive pulmonary disease, unspecified COPD type ([facility] 75 )    Hypercholesteremia  -     Lipid Panel with Direct LDL reflex; Future  -     Comprehensive metabolic panel; Future  -     CK; Future    Acute nonintractable headache, unspecified headache type  -     fluticasone (FLONASE) 50 mcg/act nasal spray; 2 sprays into each nostril daily  -     cefuroxime (CEFTIN) 500 mg tablet; Take 1 tablet (500 mg total) by mouth every 12 (twelve) hours for 14 days    Sinusitis, maxillary, chronic    Other orders  -     aspirin (ECOTRIN LOW STRENGTH) 81 mg EC tablet; Take 2 tablets by mouth daily  -     metoprolol succinate (TOPROL-XL) 50 mg 24 hr tablet; Take 1 tablet by mouth daily  -     nitroglycerin (NITROSTAT) 0 4 mg SL tablet; Place under the tongue  -     Pediatric Multivitamins-Fl (MULTIVITAMINS/FL PO); Take 1 capsule by mouth daily  -     simvastatin (ZOCOR) 40 mg tablet; Take 1 tablet by mouth  -     Discontinue: tamsulosin (FLOMAX) 0 4 mg; Take 1 capsule by mouth daily  -     Ascorbic Acid (VITAMIN C) 100 MG tablet; Take 1 tablet by mouth daily  -     pneumococcal 13-valent conjugate vaccine (PREVNAR 13);  Inject into the shoulder, thigh, or buttocks          Subjective:      Patient ID: Leonette Closs Maria Teresa Gonzales is a 77 y o  male  Patient presents in follow up for his medical problems and to review recent lab work  He complains of head stuffiness, cough in the am which is productive of light yellow sputum, postnasal drip without fever chills sweat or sob  The following portions of the patient's history were reviewed and updated as appropriate: allergies, current medications, past family history, past medical history, past social history, past surgical history and problem list     Review of Systems   Constitutional: Negative for activity change, appetite change, chills, diaphoresis, fatigue, fever and unexpected weight change  HENT: Positive for congestion and postnasal drip  Negative for dental problem, drooling, ear discharge, ear pain, facial swelling, hearing loss, mouth sores, nosebleeds, rhinorrhea, sinus pain, sinus pressure, sneezing, sore throat, tinnitus, trouble swallowing and voice change  Eyes: Negative for photophobia, pain, discharge, redness, itching and visual disturbance  Respiratory: Negative for apnea, choking, chest tightness, shortness of breath, wheezing and stridor  Cardiovascular: Negative for chest pain, palpitations and leg swelling  Gastrointestinal: Negative for abdominal distention, abdominal pain, anal bleeding, blood in stool, constipation, diarrhea, nausea, rectal pain and vomiting  Endocrine: Negative for cold intolerance, heat intolerance, polydipsia, polyphagia and polyuria  Genitourinary: Negative for decreased urine volume, difficulty urinating, dysuria, enuresis, flank pain, frequency, genital sores, hematuria and urgency  Musculoskeletal: Negative for arthralgias, back pain, gait problem, joint swelling, myalgias, neck pain and neck stiffness  Skin: Negative for color change, pallor, rash and wound  Allergic/Immunologic: Negative for environmental allergies, food allergies and immunocompromised state     Neurological: Negative for dizziness, tremors, seizures, syncope, facial asymmetry, speech difficulty, weakness, light-headedness, numbness and headaches  Hematological: Negative for adenopathy  Does not bruise/bleed easily  Psychiatric/Behavioral: Negative for agitation, self-injury, sleep disturbance and suicidal ideas  The patient is not nervous/anxious  Objective:     Physical Exam   Constitutional: He is oriented to person, place, and time  He appears well-developed and well-nourished  No distress  HENT:   Head: Normocephalic and atraumatic  Right Ear: External ear normal    Left Ear: External ear normal    Mouth/Throat: Oropharynx is clear and moist    Eyes: Conjunctivae and EOM are normal  Pupils are equal, round, and reactive to light  No scleral icterus  Neck: Normal range of motion  Neck supple  No JVD present  No tracheal deviation present  No thyromegaly present  Cardiovascular: Normal rate, regular rhythm and intact distal pulses  Exam reveals no gallop and no friction rub  No murmur heard  Pulmonary/Chest: Effort normal and breath sounds normal  No respiratory distress  He has no wheezes  He has no rales  He exhibits no tenderness  Abdominal: Soft  Bowel sounds are normal  He exhibits no distension and no mass  There is no tenderness  There is no rebound and no guarding  Musculoskeletal: Normal range of motion  He exhibits no edema, tenderness or deformity  Lymphadenopathy:     He has no cervical adenopathy  Neurological: He is alert and oriented to person, place, and time  He has normal reflexes  No cranial nerve deficit  Coordination normal    Skin: Skin is warm and dry  No rash noted  He is not diaphoretic  No erythema  No pallor  Psychiatric: He has a normal mood and affect   His behavior is normal  Judgment and thought content normal

## 2018-02-06 ENCOUNTER — OFFICE VISIT (OUTPATIENT)
Dept: CARDIOLOGY CLINIC | Facility: CLINIC | Age: 78
End: 2018-02-06
Payer: MEDICARE

## 2018-02-06 VITALS
HEIGHT: 67 IN | WEIGHT: 163 LBS | DIASTOLIC BLOOD PRESSURE: 70 MMHG | OXYGEN SATURATION: 96 % | BODY MASS INDEX: 25.58 KG/M2 | SYSTOLIC BLOOD PRESSURE: 132 MMHG | HEART RATE: 87 BPM

## 2018-02-06 DIAGNOSIS — I10 ESSENTIAL HYPERTENSION: ICD-10-CM

## 2018-02-06 DIAGNOSIS — E78.00 HYPERCHOLESTEREMIA: ICD-10-CM

## 2018-02-06 DIAGNOSIS — I25.10 ATHEROSCLEROSIS OF NATIVE CORONARY ARTERY OF NATIVE HEART WITHOUT ANGINA PECTORIS: Primary | ICD-10-CM

## 2018-02-06 DIAGNOSIS — I65.21 STENOSIS OF RIGHT CAROTID ARTERY: ICD-10-CM

## 2018-02-06 DIAGNOSIS — Z95.1 HX OF CABG: ICD-10-CM

## 2018-02-06 LAB
B BURGDOR IGG SER IA-ACNC: 0.12
B BURGDOR IGM SER IA-ACNC: 0.18
RHEUMATOID FACT SER QL LA: NEGATIVE

## 2018-02-06 PROCEDURE — 99214 OFFICE O/P EST MOD 30 MIN: CPT | Performed by: INTERNAL MEDICINE

## 2018-02-06 NOTE — PROGRESS NOTES
Cardiology Follow Up    [facility]  1940  [id number]  [address] [place] [address]    1  Atherosclerosis of native coronary artery of native heart without angina pectoris     2  Hx of CABG     3  Hypercholesteremia     4  Essential hypertension     5  Stenosis of right carotid artery       Chief Complaint   Patient presents with    Follow-up       Interval History:  Patient presents for routine follow-up  He recently had right CEA  Patient does admit to occasional lightheadedness but nothing of the ordinary for him  He denies chest pain, palpitations, shortness of breath, edema, presyncope/syncope  He denies recent change in functional capacity  He reports compliance with all of his medications  Patient Active Problem List   Diagnosis    Hypercholesteremia    Acute nonintractable headache    Chronic obstructive pulmonary disease (HCC)    Benign prostatic hyperplasia without lower urinary tract symptoms    Sinusitis, maxillary, chronic    Carotid artery stenosis    Hx of CABG    Atherosclerosis of coronary artery    Essential hypertension     Past Medical History:   Diagnosis Date    CAD (coronary artery disease)     Hyperlipemia     Hypertension     Renal lesion      Social History     Social History    Marital status:      Spouse name: N/A    Number of children: 2    Years of education: N/A     Occupational History    Not on file       Social History Main Topics    Smoking status: Former Smoker    Smokeless tobacco: Never Used    Alcohol use 0 6 oz/week     1 Glasses of wine per week    Drug use: No    Sexual activity: Not on file     Other Topics Concern    Not on file     Social History Narrative    Caffeine use      Family History   Problem Relation Age of Onset    Nephrolithiasis Mother     Heart attack Father     Stroke Father      Past Surgical History:   Procedure Laterality Date    CAROTID ENDARTERECTOMY Right     CORONARY ARTERY BYPASS GRAFT  2011    HERNIA REPAIR      LITHOTRIPSY      TOTAL HIP ARTHROPLASTY         Current Outpatient Prescriptions:     Ascorbic Acid (VITAMIN C) 100 MG tablet, Take 1 tablet by mouth daily, Disp: , Rfl:     aspirin (ECOTRIN LOW STRENGTH) 81 mg EC tablet, Take 3 tablets by mouth daily  , Disp: , Rfl:     fluticasone (FLONASE) 50 mcg/act nasal spray, 2 sprays into each nostril daily, Disp: 16 g, Rfl: 1    metoprolol succinate (TOPROL-XL) 50 mg 24 hr tablet, Take 1 tablet by mouth daily, Disp: , Rfl:     nitroglycerin (NITROSTAT) 0 4 mg SL tablet, Place under the tongue, Disp: , Rfl:     Pediatric Multivitamins-Fl (MULTIVITAMINS/FL PO), Take 1 capsule by mouth daily, Disp: , Rfl:     simvastatin (ZOCOR) 40 mg tablet, Take 1 tablet by mouth, Disp: , Rfl:     tamsulosin (FLOMAX) 0 4 mg, Take 1 capsule (0 4 mg total) by mouth daily, Disp: 30 capsule, Rfl: 6    cefuroxime (CEFTIN) 500 mg tablet, Take 1 tablet (500 mg total) by mouth every 12 (twelve) hours for 14 days, Disp: 28 tablet, Rfl: 0    pneumococcal 13-valent conjugate vaccine (PREVNAR 13), Inject into the shoulder, thigh, or buttocks, Disp: , Rfl:   No Known Allergies            Review of Systems:  Review of Systems   Constitutional: Negative for activity change, diaphoresis, fatigue, fever and unexpected weight change  HENT: Negative for nosebleeds and trouble swallowing  Eyes: Negative for visual disturbance  Respiratory: Negative for cough, chest tightness, shortness of breath and wheezing  Cardiovascular: Negative for chest pain, palpitations and leg swelling  Gastrointestinal: Negative for abdominal pain, anal bleeding, blood in stool and nausea  Endocrine: Negative for cold intolerance and heat intolerance  Genitourinary: Negative for decreased urine volume, frequency and hematuria     Musculoskeletal: Negative for myalgias  Skin: Negative for color change and wound  Neurological: Negative for dizziness, syncope, weakness, light-headedness and headaches  Psychiatric/Behavioral: Negative for sleep disturbance  The patient is not nervous/anxious  Physical Exam:  Physical Exam   Constitutional: He is oriented to person, place, and time  He appears well-developed and well-nourished  No distress  HENT:   Head: Normocephalic and atraumatic  Eyes: Conjunctivae are normal  No scleral icterus  Neck: Neck supple  No JVD present  Cardiovascular: Normal rate and regular rhythm  No extrasystoles are present  Exam reveals no gallop  No murmur heard  Pulmonary/Chest: Effort normal and breath sounds normal  No respiratory distress  He has no wheezes  He has no rales  Abdominal: He exhibits no distension  There is no tenderness  Musculoskeletal: He exhibits no edema  Neurological: He is alert and oriented to person, place, and time  Skin: Skin is warm and dry  No rash noted  He is not diaphoretic  Psychiatric: He has a normal mood and affect  Discussion/Summary:  CAD status post CABG  -Pharmacological nuclear stress test on 03/2017 without evidence of ischemia  -Echo 03/2017 EF 60%, no regional wall motion abnormality, no significant valvular disease  -Patient without symptoms suggestive of angina   -on aspirin, Toprol, simvastatin, nitroglycerin as needed    Carotid stenosis status post right CEA    Hyperlipidemia- on simvastatin     Hypertension-blood pressure control, continue present regimen    Signs and symptoms to watch out for from cardiac standpoint reviewed with patient  Patient advised to report any concerns  Medications and side effects reviewed  Importance of compliance discussed

## 2018-02-07 LAB
CCP IGA+IGG SERPL IA-ACNC: 2 UNITS (ref 0–19)
RYE IGE QN: NEGATIVE

## 2018-02-27 ENCOUNTER — OFFICE VISIT (OUTPATIENT)
Dept: NEUROLOGY | Facility: CLINIC | Age: 78
End: 2018-02-27
Payer: MEDICARE

## 2018-02-27 VITALS
SYSTOLIC BLOOD PRESSURE: 102 MMHG | WEIGHT: 164 LBS | BODY MASS INDEX: 25.69 KG/M2 | DIASTOLIC BLOOD PRESSURE: 60 MMHG | HEART RATE: 75 BPM

## 2018-02-27 DIAGNOSIS — G44.86 HEADACHE, CERVICOGENIC: ICD-10-CM

## 2018-02-27 DIAGNOSIS — J32.0 SINUSITIS, MAXILLARY, CHRONIC: Primary | ICD-10-CM

## 2018-02-27 PROCEDURE — 99213 OFFICE O/P EST LOW 20 MIN: CPT | Performed by: PSYCHIATRY & NEUROLOGY

## 2018-02-27 NOTE — PROGRESS NOTES
Patient ID: Lito Chirinos is a 77 y o  male  Assessment:  1  Sinusitis, maxillary, chronic  Ambulatory Referral to Otolaryngology   2  Headache, cervicogenic         Plan:    Patient with a history of headaches most likely cervicogenic also describes associated neck pain  He has a history of chronic sinusitis and recent MRI confirmed the same  Patient will be referred for ENT evaluation for his chronic sinusitis  He does not wish to take any medications for his headache  He will return back to see me in 3 months  Subjective:    HPI  Patient is here for a follow-up visit and continues to experience headaches frequently mostly is describes a frontal headache which he claims is related to his sinusitis and at times when he has neck pain the pain radiates in the occipital head region causing him to have a headache  Past Medical History:   Diagnosis Date    CAD (coronary artery disease)     Hyperlipemia     Hypertension     Renal lesion        Past Surgical History:   Procedure Laterality Date    CAROTID ENDARTERECTOMY Right     CORONARY ARTERY BYPASS GRAFT  2011    HERNIA REPAIR      LITHOTRIPSY      TOTAL HIP ARTHROPLASTY         Family History   Problem Relation Age of Onset    Nephrolithiasis Mother     Heart attack Father     Stroke Father         reports that he has quit smoking  He has never used smokeless tobacco  He reports that he drinks about 0 6 oz of alcohol per week   He reports that he does not use drugs  ALLERGIES:  Patient has no known allergies        Current Outpatient Prescriptions:     Ascorbic Acid (VITAMIN C) 100 MG tablet, Take 1 tablet by mouth daily, Disp: , Rfl:     aspirin (ECOTRIN LOW STRENGTH) 81 mg EC tablet, Take 3 tablets by mouth daily  , Disp: , Rfl:     fluticasone (FLONASE) 50 mcg/act nasal spray, 2 sprays into each nostril daily, Disp: 16 g, Rfl: 1    metoprolol succinate (TOPROL-XL) 50 mg 24 hr tablet, Take 1 tablet by mouth daily, Disp: , Rfl:   nitroglycerin (NITROSTAT) 0 4 mg SL tablet, Place under the tongue, Disp: , Rfl:     Pediatric Multivitamins-Fl (MULTIVITAMINS/FL PO), Take 1 capsule by mouth daily, Disp: , Rfl:     pneumococcal 13-valent conjugate vaccine (PREVNAR 13), Inject into the shoulder, thigh, or buttocks, Disp: , Rfl:     simvastatin (ZOCOR) 40 mg tablet, Take 1 tablet by mouth, Disp: , Rfl:     tamsulosin (FLOMAX) 0 4 mg, Take 1 capsule (0 4 mg total) by mouth daily, Disp: 30 capsule, Rfl: 6     Objective:    Blood pressure 102/60, pulse 75, weight 74 4 kg (164 lb)  Physical Exam    Neurological Exam    His neurological examination shows evidence of mild to moderate tenderness in the mid cervical segments, no significant suboccipital tenderness was noted, there is evidence of mild maxillary sinus tenderness noted bilaterally, with no new focal deficits noted on motor and sensory exam   ROS:    Review of Systems   Constitutional: Negative  HENT: Positive for sinus pain, sinus pressure and tinnitus  Eyes: Positive for itching  Respiratory: Positive for shortness of breath  Cardiovascular: Negative  Gastrointestinal: Negative  Endocrine: Negative  Genitourinary: Positive for frequency  Musculoskeletal: Negative  Skin: Negative  Allergic/Immunologic: Negative  Neurological: Positive for light-headedness and headaches  Hematological: Negative

## 2018-03-07 NOTE — PROGRESS NOTES
History of Present Illness    Revaccination   Vaccine Information: Vaccine(s) Given (names): tdap [id number]  Unable to reach by phone  Spoke with regarding vaccine out of temperature range and risks and benefits of revaccination  Pt called (attempt 1): [phone] sf  Pt called (attempt 2): [phone] sf  Pt called (attempt 3): [place] Simple Letter Sent (Regular and Certified): [phone] sf  Other Information: [phone] sh spoke to pt about rvac tdap and pt said that they would like to receive this later in the year          Active Problems    1  Abnormal CT scan, gastrointestinal tract (793 4) (R93 3)   2  Abnormal loss of weight (783 21) (R63 4)   3  Atherosclerosis of coronary artery (414 00) (I25 10)   4  Atherosclerosis of coronary artery (414 00) (I25 10)   5  Back pain (724 5) (M54 9)   6  Benign localized prostatic hyperplasia with lower urinary tract symptoms (LUTS)   (600 21,599 69) (N40 1)   7  Bilateral impacted cerumen (380 4) (H61 23)   8  Blood in stool (578 1) (K92 1)   9  CABG   10  Carotid artery stenosis (433 10) (I65 29)   11  Carotid bruit (785 9) (R09 89)   12  Cataract (366 9) (H26 9)   13  Cervical radiculopathy (723 4) (M54 12)   14  Chronic obstructive pulmonary disease (496) (J44 9)   15  Constipation (564 00) (K59 00)   16  Emphysema (492 8) (J43 9)   17  Encounter for screening for malignant neoplasm of colon (V76 51) (Z12 11)   18  Esophageal reflux (530 81) (K21 9)   19  Essential hypertension (401 9) (I10)   20  Flu vaccine need (V04 81) (Z23)   21  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)   22  Heart disease (429 9) (I51 9)   23  Hyperlipidemia (272 4) (E78 5)   24  Impaired fasting glucose (790 21) (R73 01)   25  Inguinal hernia (550 90) (K40 90)   26  Left flank pain (789 09) (R10 9)   27  Memory loss (780 93) (R41 3)   28  Need for prophylactic vaccination against Streptococcus pneumoniae (pneumococcus)    (V03 82) (Z23)   29   Need for revaccination (V05 9) (Z23)   30  Need for vaccination for DTP (V06 1) (Z23)   31  Nephrolithiasis (592 0) (N20 0)   32  Occlusion and stenosis of unspecified carotid artery (433 10) (I65 29)   33  Osteoarthritis of hip (715 95) (M16 9)   34  Pain in the abdomen (789 00) (R10 9)   35  Plantar warts (078 12) (B07 0)   36  Preoperative clearance (V72 84) (Z01 818)   37  PVD (peripheral vascular disease) (443 9) (I73 9)   38  Renal colic (907 3) (G57)   39  Right foot pain (729 5) (M79 671)   40  Shortness of breath (786 05) (R06 02)   41  Special screening for malignant neoplasm of prostate (V76 44) (Z12 5)   42  Strain of neck muscle, initial encounter (847 0) (S16 1XXA)   43  Subjective tinnitus of left ear (388 31) (H93 12)    Immunizations  Influenza --- [facility]: 21-Oct-2003; Adriana Stanton: [id number] Emre: [id number] Mates:  05-Jan-2009; Series5: 21-Oct-2010; Series6: [id number] Cool: 24-Oct-2016   PCV --- [facility]: 07-Mar-2016   PPSV --- [facility]: 21-Oct-2003   Tdap --- [facility]: 24-Apr-2014     Current Meds   1  Aspirin EC 81 MG Oral Tablet Delayed Release; 2 tabs daily   2  Donepezil HCl - 5 MG Oral Tablet; TAKE 1 TABLET Bedtime   3  Metoprolol Succinate ER 50 MG Oral Tablet Extended Release 24 Hour; TAKE 1 TABLET   DAILY   4  Multivitamins Oral Capsule; TAKE 1 CAPSULE DAILY   5  Nabumetone 750 MG Oral Tablet; TAKE 1 TABLET EVERY 12 HOURS DAILY   6  Prevnar 13 Intramuscular Suspension; inject as directed   7  Simvastatin 40 MG Oral Tablet; TAKE 1 TABLET DAILY AT BEDTIME   8  Tamsulosin HCl - 0 4 MG Oral Capsule; take 1 capsule daily   9  Vitamin C TABS; TAKE 1 TABLET DAILY    Allergies    1  No Known Drug Allergies    Future Appointments    Date/Time Provider Specialty Site   03/09/2017 09:45 AM TIFFANI Kendall   Cardiology [facility] CARDIOLOGY [facility]     Signatures  Electronically signed by : TIFFANI Hagen ; Jan 5 2017 11:56AM EST

## 2018-04-26 DIAGNOSIS — I10 ESSENTIAL HYPERTENSION: Primary | ICD-10-CM

## 2018-04-26 DIAGNOSIS — E78.00 HYPERCHOLESTEROLEMIA: ICD-10-CM

## 2018-04-26 DIAGNOSIS — N40.0 BENIGN PROSTATIC HYPERPLASIA WITHOUT LOWER URINARY TRACT SYMPTOMS: ICD-10-CM

## 2018-04-26 NOTE — TELEPHONE ENCOUNTER
PT  JAMES  NEEDS  RXS  TAMSULOSIN 0 4MG   SIMVASTATIN 40MG   METOPROLOL ER 50 MG  [facility]  ANY  QUESTIONS  CALL PT  [phone]

## 2018-04-27 RX ORDER — SIMVASTATIN 40 MG
40 TABLET ORAL
Qty: 90 TABLET | Refills: 3 | Status: SHIPPED | OUTPATIENT
Start: 2018-04-27 | End: 2018-10-29 | Stop reason: SDUPTHER

## 2018-04-27 RX ORDER — TAMSULOSIN HYDROCHLORIDE 0.4 MG/1
0.4 CAPSULE ORAL DAILY
Qty: 90 CAPSULE | Refills: 3 | Status: SHIPPED | OUTPATIENT
Start: 2018-04-27 | End: 2018-10-29 | Stop reason: SDUPTHER

## 2018-04-27 RX ORDER — METOPROLOL SUCCINATE 50 MG/1
50 TABLET, EXTENDED RELEASE ORAL DAILY
Qty: 90 TABLET | Refills: 3 | Status: SHIPPED | OUTPATIENT
Start: 2018-04-27 | End: 2018-10-29 | Stop reason: SDUPTHER

## 2018-05-03 ENCOUNTER — OFFICE VISIT (OUTPATIENT)
Dept: CARDIOLOGY CLINIC | Facility: CLINIC | Age: 78
End: 2018-05-03
Payer: MEDICARE

## 2018-05-03 VITALS
SYSTOLIC BLOOD PRESSURE: 124 MMHG | DIASTOLIC BLOOD PRESSURE: 62 MMHG | HEIGHT: 67 IN | HEART RATE: 84 BPM | WEIGHT: 162.6 LBS | OXYGEN SATURATION: 97 % | BODY MASS INDEX: 25.52 KG/M2

## 2018-05-03 DIAGNOSIS — E78.00 HYPERCHOLESTEREMIA: ICD-10-CM

## 2018-05-03 DIAGNOSIS — I65.21 STENOSIS OF RIGHT CAROTID ARTERY: ICD-10-CM

## 2018-05-03 DIAGNOSIS — I10 ESSENTIAL HYPERTENSION: ICD-10-CM

## 2018-05-03 DIAGNOSIS — I25.10 ATHEROSCLEROSIS OF NATIVE CORONARY ARTERY OF NATIVE HEART WITHOUT ANGINA PECTORIS: Primary | ICD-10-CM

## 2018-05-03 PROCEDURE — 99214 OFFICE O/P EST MOD 30 MIN: CPT | Performed by: INTERNAL MEDICINE

## 2018-05-03 NOTE — PROGRESS NOTES
[facility]  [facility] [address]  [facility] [address]  Cardiology Consultation     Betito Winkler  [id number]  1940      1  Atherosclerosis of native coronary artery of native heart without angina pectoris     2  Stenosis of right carotid artery     3  Essential hypertension     4  Hx of CABG     5  Hypercholesteremia         Chief Complaint   Patient presents with    Follow-up       HPI:  Patient with history of CAD s/p CABG, carotid stenosis s/p endarterectomy, HTN, HLD presents for follow up visit  Denies chest pain, SOB, lightheadedness, palpitations, leg swelling, syncope  Does admit to occasional chest tightness while laying down  No exertional chest pain or SOB  Compliant on medications  Patient Active Problem List   Diagnosis    Hypercholesteremia    Acute nonintractable headache    Chronic obstructive pulmonary disease (HCC)    Benign prostatic hyperplasia without lower urinary tract symptoms    Sinusitis, maxillary, chronic    Carotid artery stenosis    Hx of CABG    Atherosclerosis of coronary artery    Essential hypertension     Past Medical History:   Diagnosis Date    CAD (coronary artery disease)     Hyperlipemia     Hypertension     Renal lesion      Social History     Social History    Marital status:      Spouse name: N/A    Number of children: 2    Years of education: N/A     Occupational History    Not on file       Social History Main Topics    Smoking status: Former Smoker    Smokeless tobacco: Never Used    Alcohol use 0 6 oz/week     1 Glasses of wine per week    Drug use: No    Sexual activity: Not on file     Other Topics Concern    Not on file     Social History Narrative    Caffeine use      Family History   Problem Relation Age of Onset    Nephrolithiasis Mother     Heart attack Father     Stroke Father      Past Surgical History:   Procedure Laterality Date    CAROTID ENDARTERECTOMY Right     CORONARY ARTERY BYPASS GRAFT 2011    HERNIA REPAIR      LITHOTRIPSY      TOTAL HIP ARTHROPLASTY         Current Outpatient Prescriptions:     Ascorbic Acid (VITAMIN C) 100 MG tablet, Take 1 tablet by mouth daily, Disp: , Rfl:     aspirin (ECOTRIN LOW STRENGTH) 81 mg EC tablet, Take 3 tablets by mouth daily  , Disp: , Rfl:     fluticasone (FLONASE) 50 mcg/act nasal spray, 2 sprays into each nostril daily, Disp: 16 g, Rfl: 1    metoprolol succinate (TOPROL-XL) 50 mg 24 hr tablet, Take 1 tablet (50 mg total) by mouth daily, Disp: 90 tablet, Rfl: 3    nitroglycerin (NITROSTAT) 0 4 mg SL tablet, Place under the tongue, Disp: , Rfl:     Pediatric Multivitamins-Fl (MULTIVITAMINS/FL PO), Take 1 capsule by mouth daily, Disp: , Rfl:     simvastatin (ZOCOR) 40 mg tablet, Take 1 tablet (40 mg total) by mouth daily at bedtime, Disp: 90 tablet, Rfl: 3    tamsulosin (FLOMAX) 0 4 mg, Take 1 capsule (0 4 mg total) by mouth daily, Disp: 90 capsule, Rfl: 3  Allergies   Allergen Reactions    Prednisone Diarrhea     Vitals:    05/03/18 1321   BP: 124/62   Pulse: 84   SpO2: 97%   Weight: 73 8 kg (162 lb 9 6 oz)   Height: 5' 7" (1 702 m)       Labs:  No visits with results within 2 Month(s) from this visit     Latest known visit with results is:   Appointment on 02/05/2018   Component Date Value    Sodium 02/05/2018 140     Potassium 02/05/2018 4 4     Chloride 02/05/2018 106     CO2 02/05/2018 27     Anion Gap 02/05/2018 7     BUN 02/05/2018 17     Creatinine 02/05/2018 0 92     Glucose, Fasting 02/05/2018 86     Calcium 02/05/2018 9 0     AST 02/05/2018 23     ALT 02/05/2018 21     Alkaline Phosphatase 02/05/2018 90     Total Protein 02/05/2018 7 4     Albumin 02/05/2018 4 1     Total Bilirubin 02/05/2018 0 58     eGFR 02/05/2018 79     Total CK 02/05/2018 106     Hemoglobin A1C 02/05/2018 5 9     EAG 02/05/2018 123     Rheumatoid Factor 02/05/2018 Negative     LINDSAY 02/05/2018 Negative     Cyclic Citrullin Peptide* 02/05/2018 2     LYME AB IGG 02/05/2018 0 12     LYME AB IGM 02/05/2018 0 18     CRP 02/05/2018 <3 0     Creatinine, Ur 02/05/2018 153 0     Microalbum  ,U,Random 02/05/2018 63 5*    Microalb Creat Ratio 02/05/2018 42*    Cholesterol 02/05/2018 129     Triglycerides 02/05/2018 162*    HDL, Direct 02/05/2018 46     LDL Calculated 02/05/2018 51      Imaging: No results found  Review of Systems:  Review of Systems   Constitutional: Negative for diaphoresis, fatigue and fever  HENT: Negative for congestion, ear discharge, hearing loss, sinus pain and sore throat  Eyes: Negative for pain, redness and visual disturbance  Respiratory: Negative for cough, chest tightness, shortness of breath and wheezing  Cardiovascular: Negative for chest pain, palpitations and leg swelling  Gastrointestinal: Negative for abdominal distention, abdominal pain, constipation, diarrhea, nausea and vomiting  Endocrine: Negative for cold intolerance and heat intolerance  Genitourinary: Negative for difficulty urinating and hematuria  Musculoskeletal: Negative for arthralgias, back pain, gait problem, joint swelling, myalgias, neck pain and neck stiffness  Skin: Negative for color change, pallor, rash and wound  Neurological: Negative for dizziness, syncope, weakness, numbness and headaches  Hematological: Does not bruise/bleed easily  Psychiatric/Behavioral: Negative for agitation, behavioral problems and confusion  The patient is not nervous/anxious  /62   Pulse 84   Ht 5' 7" (1 702 m)   Wt 73 8 kg (162 lb 9 6 oz)   SpO2 97%   BMI 25 47 kg/m²     Physical Exam:  Physical Exam   Constitutional: He is oriented to person, place, and time  He appears well-developed and well-nourished  HENT:   Head: Normocephalic and atraumatic  Eyes: Conjunctivae and EOM are normal  Pupils are equal, round, and reactive to light  Neck: Normal range of motion  Neck supple     Cardiovascular: Normal rate, regular rhythm, normal heart sounds and intact distal pulses  Exam reveals no gallop and no friction rub  No murmur heard  Pulmonary/Chest: Effort normal and breath sounds normal  No respiratory distress  He has no wheezes  He has no rales  He exhibits no tenderness  Abdominal: Soft  Bowel sounds are normal  He exhibits no distension  There is no rebound  Musculoskeletal: Normal range of motion  He exhibits no edema  Neurological: He is alert and oriented to person, place, and time  He has normal reflexes  Skin: Skin is warm and dry  Psychiatric: He has a normal mood and affect  His behavior is normal  Judgment and thought content normal        Discussion/Summary:  1  CAD s/p CABG:  -No anginal symptoms  -Continue ASA, toprol, statin    2  Carotid stenosis s/p endarterectomy: Continue ASA, statin    3  HTN: Stable, continue present regimen    4  Hypercholesterolemia: On simvastatin, last lipid panel showed        F/u 6 months

## 2018-06-05 ENCOUNTER — TELEPHONE (OUTPATIENT)
Dept: INTERNAL MEDICINE CLINIC | Facility: CLINIC | Age: 78
End: 2018-06-05

## 2018-06-05 ENCOUNTER — OFFICE VISIT (OUTPATIENT)
Dept: INTERNAL MEDICINE CLINIC | Facility: CLINIC | Age: 78
End: 2018-06-05
Payer: MEDICARE

## 2018-06-05 VITALS
SYSTOLIC BLOOD PRESSURE: 124 MMHG | WEIGHT: 164.4 LBS | HEIGHT: 67 IN | HEART RATE: 82 BPM | OXYGEN SATURATION: 95 % | DIASTOLIC BLOOD PRESSURE: 58 MMHG | BODY MASS INDEX: 25.8 KG/M2

## 2018-06-05 DIAGNOSIS — K21.9 GASTROESOPHAGEAL REFLUX DISEASE WITHOUT ESOPHAGITIS: Primary | ICD-10-CM

## 2018-06-05 DIAGNOSIS — E78.2 MIXED HYPERLIPIDEMIA: ICD-10-CM

## 2018-06-05 DIAGNOSIS — J43.1 PANLOBULAR EMPHYSEMA (HCC): ICD-10-CM

## 2018-06-05 DIAGNOSIS — I25.10 ATHEROSCLEROSIS OF NATIVE CORONARY ARTERY OF NATIVE HEART WITHOUT ANGINA PECTORIS: ICD-10-CM

## 2018-06-05 DIAGNOSIS — R51.9 ACUTE NONINTRACTABLE HEADACHE, UNSPECIFIED HEADACHE TYPE: ICD-10-CM

## 2018-06-05 DIAGNOSIS — R73.01 IMPAIRED FASTING GLUCOSE: ICD-10-CM

## 2018-06-05 DIAGNOSIS — Z12.11 SCREEN FOR COLON CANCER: ICD-10-CM

## 2018-06-05 DIAGNOSIS — Z12.5 SCREENING FOR PROSTATE CANCER: ICD-10-CM

## 2018-06-05 PROBLEM — E78.5 HYPERLIPIDEMIA: Status: ACTIVE | Noted: 2018-02-05

## 2018-06-05 PROBLEM — G89.29 CHRONIC LEFT SHOULDER PAIN: Status: ACTIVE | Noted: 2017-01-23

## 2018-06-05 PROBLEM — J43.9 PULMONARY EMPHYSEMA (HCC): Status: ACTIVE | Noted: 2018-02-05

## 2018-06-05 PROBLEM — J32.3 CHRONIC SPHENOIDAL SINUSITIS: Status: ACTIVE | Noted: 2017-12-29

## 2018-06-05 PROBLEM — G89.29 CHRONIC NONINTRACTABLE HEADACHE: Status: ACTIVE | Noted: 2018-02-05

## 2018-06-05 PROBLEM — M25.512 CHRONIC LEFT SHOULDER PAIN: Status: ACTIVE | Noted: 2017-01-23

## 2018-06-05 PROBLEM — R13.12 OROPHARYNGEAL DYSPHAGIA: Status: ACTIVE | Noted: 2018-06-04

## 2018-06-05 PROCEDURE — 99214 OFFICE O/P EST MOD 30 MIN: CPT | Performed by: INTERNAL MEDICINE

## 2018-06-05 RX ORDER — FLUTICASONE PROPIONATE 50 MCG
2 SPRAY, SUSPENSION (ML) NASAL DAILY
Qty: 16 G | Refills: 5 | Status: SHIPPED | OUTPATIENT
Start: 2018-06-05

## 2018-06-05 NOTE — PROGRESS NOTES
Assessment/Plan:    Screen for colon cancer  Colonoscopy 2015  Will order fit    Screening for prostate cancer  psa and nate at follow up     Panlobular emphysema ([address] )  Stable off inhalers  Will follow    Impaired fasting glucose  No overt dm  Follow fbs a1c and microalbumen ratio  Diagnoses and all orders for this visit:    Gastroesophageal reflux disease without esophagitis    Acute nonintractable headache, unspecified headache type  -     fluticasone (FLONASE) 50 mcg/act nasal spray; 2 sprays into each nostril daily    Impaired fasting glucose  -     Comprehensive metabolic panel; Future    Panlobular emphysema (HCC)    Atherosclerosis of native coronary artery of native heart without angina pectoris    Mixed hyperlipidemia  -     Comprehensive metabolic panel; Future  -     CK; Future  -     Lipid Panel with Direct LDL reflex; Future    Screen for colon cancer  -     Occult Bloood,Fecal Immunochemical; Future    Screening for prostate cancer  -     PSA, Total Screen; Future          Subjective:      Patient ID: Bharathi Jason is a 77 y o  male  Patient presents in follow up for his medical problems and to review recent lab work  He needs a renewal on his flonase  The following portions of the patient's history were reviewed and updated as appropriate: allergies, current medications, past family history, past medical history, past social history, past surgical history and problem list     Review of Systems   Constitutional: Negative for activity change, appetite change, chills, diaphoresis, fatigue, fever and unexpected weight change  HENT: Negative for congestion, dental problem, drooling, ear discharge, ear pain, facial swelling, hearing loss, mouth sores, nosebleeds, postnasal drip, rhinorrhea, sinus pain, sinus pressure, sneezing, sore throat, tinnitus, trouble swallowing and voice change  Eyes: Negative for photophobia, pain, discharge, redness, itching and visual disturbance  Respiratory: Negative for apnea, cough, choking, chest tightness, shortness of breath, wheezing and stridor  Cardiovascular: Negative for chest pain, palpitations and leg swelling  Gastrointestinal: Negative for abdominal distention, abdominal pain, anal bleeding, blood in stool, constipation, diarrhea, nausea, rectal pain and vomiting  Endocrine: Negative for cold intolerance, heat intolerance, polydipsia, polyphagia and polyuria  Genitourinary: Negative for decreased urine volume, difficulty urinating, dysuria, enuresis, flank pain, frequency, genital sores, hematuria and urgency  Musculoskeletal: Negative for arthralgias, back pain, gait problem, joint swelling, myalgias, neck pain and neck stiffness  Skin: Negative for color change, pallor, rash and wound  Allergic/Immunologic: Negative for environmental allergies, food allergies and immunocompromised state  Neurological: Negative for dizziness, tremors, seizures, syncope, facial asymmetry, speech difficulty, weakness, light-headedness, numbness and headaches  Hematological: Negative for adenopathy  Does not bruise/bleed easily  Psychiatric/Behavioral: Negative for agitation, self-injury, sleep disturbance and suicidal ideas  The patient is not nervous/anxious  Objective:      /58 (BP Location: Left arm, Patient Position: Sitting)   Pulse 82   Ht 5' 7" (1 702 m)   Wt 74 6 kg (164 lb 6 4 oz)   SpO2 95%   BMI 25 75 kg/m²          Physical Exam   Constitutional: He is oriented to person, place, and time  He appears well-developed and well-nourished  No distress  HENT:   Head: Normocephalic and atraumatic  Right Ear: External ear normal    Left Ear: External ear normal    Mouth/Throat: Oropharynx is clear and moist    Eyes: Conjunctivae and EOM are normal  Pupils are equal, round, and reactive to light  No scleral icterus  Neck: Normal range of motion  Neck supple  No JVD present  No tracheal deviation present   No thyromegaly present  Cardiovascular: Normal rate, regular rhythm and intact distal pulses  Exam reveals no gallop and no friction rub  No murmur heard  Pulmonary/Chest: Effort normal and breath sounds normal  No respiratory distress  He has no wheezes  He has no rales  He exhibits no tenderness  Abdominal: Soft  Bowel sounds are normal  He exhibits no distension and no mass  There is no tenderness  There is no rebound and no guarding  Musculoskeletal: Normal range of motion  He exhibits no edema, tenderness or deformity  Lymphadenopathy:     He has no cervical adenopathy  Neurological: He is alert and oriented to person, place, and time  He has normal reflexes  No cranial nerve deficit  Coordination normal    Skin: Skin is warm and dry  No rash noted  He is not diaphoretic  No erythema  No pallor  Psychiatric: He has a normal mood and affect   His behavior is normal  Judgment and thought content normal

## 2018-07-09 ENCOUNTER — APPOINTMENT (OUTPATIENT)
Dept: VASCULAR SURGERY | Facility: CLINIC | Age: 78
End: 2018-07-09
Payer: MEDICARE

## 2018-07-09 VITALS — SYSTOLIC BLOOD PRESSURE: 83 MMHG | HEART RATE: 86 BPM | DIASTOLIC BLOOD PRESSURE: 40 MMHG | OXYGEN SATURATION: 97 %

## 2018-07-09 PROCEDURE — 99214 OFFICE O/P EST MOD 30 MIN: CPT

## 2018-07-09 PROCEDURE — 93880 EXTRACRANIAL BILAT STUDY: CPT

## 2018-10-29 ENCOUNTER — OFFICE VISIT (OUTPATIENT)
Dept: INTERNAL MEDICINE CLINIC | Facility: CLINIC | Age: 78
End: 2018-10-29
Payer: MEDICARE

## 2018-10-29 VITALS
HEART RATE: 74 BPM | DIASTOLIC BLOOD PRESSURE: 66 MMHG | SYSTOLIC BLOOD PRESSURE: 128 MMHG | WEIGHT: 167.8 LBS | BODY MASS INDEX: 26.28 KG/M2 | OXYGEN SATURATION: 97 %

## 2018-10-29 DIAGNOSIS — I10 ESSENTIAL HYPERTENSION: ICD-10-CM

## 2018-10-29 DIAGNOSIS — M25.552 LEFT HIP PAIN: Primary | ICD-10-CM

## 2018-10-29 DIAGNOSIS — E78.00 HYPERCHOLESTEROLEMIA: ICD-10-CM

## 2018-10-29 DIAGNOSIS — N40.0 BENIGN PROSTATIC HYPERPLASIA WITHOUT LOWER URINARY TRACT SYMPTOMS: ICD-10-CM

## 2018-10-29 DIAGNOSIS — K21.9 GASTROESOPHAGEAL REFLUX DISEASE WITHOUT ESOPHAGITIS: ICD-10-CM

## 2018-10-29 PROCEDURE — 99213 OFFICE O/P EST LOW 20 MIN: CPT | Performed by: INTERNAL MEDICINE

## 2018-10-29 RX ORDER — SIMVASTATIN 40 MG
40 TABLET ORAL
Qty: 90 TABLET | Refills: 2 | Status: SHIPPED | OUTPATIENT
Start: 2018-10-29 | End: 2019-04-30 | Stop reason: SDUPTHER

## 2018-10-29 RX ORDER — MELOXICAM 7.5 MG/1
7.5 TABLET ORAL DAILY
Qty: 30 TABLET | Refills: 0 | Status: SHIPPED | OUTPATIENT
Start: 2018-10-29 | End: 2018-12-06 | Stop reason: ALTCHOICE

## 2018-10-29 RX ORDER — TAMSULOSIN HYDROCHLORIDE 0.4 MG/1
0.4 CAPSULE ORAL DAILY
Qty: 90 CAPSULE | Refills: 2 | Status: SHIPPED | OUTPATIENT
Start: 2018-10-29 | End: 2019-04-30 | Stop reason: SDUPTHER

## 2018-10-29 RX ORDER — PANTOPRAZOLE SODIUM 40 MG/1
40 TABLET, DELAYED RELEASE ORAL DAILY
Qty: 90 TABLET | Refills: 2 | Status: SHIPPED | OUTPATIENT
Start: 2018-10-29 | End: 2019-01-14 | Stop reason: SDUPTHER

## 2018-10-29 RX ORDER — METOPROLOL SUCCINATE 50 MG/1
50 TABLET, EXTENDED RELEASE ORAL DAILY
Qty: 90 TABLET | Refills: 2 | Status: SHIPPED | OUTPATIENT
Start: 2018-10-29 | End: 2019-04-30 | Stop reason: SDUPTHER

## 2018-10-29 RX ORDER — PANTOPRAZOLE SODIUM 40 MG/1
TABLET, DELAYED RELEASE ORAL
COMMUNITY
End: 2018-10-29 | Stop reason: SDUPTHER

## 2018-10-29 NOTE — PROGRESS NOTES
Assessment/Plan:    No problem-specific Assessment & Plan notes found for this encounter  Diagnoses and all orders for this visit:    Left hip pain  -     XR hip/pelv 2-3 vws left if performed; Future  -     meloxicam (MOBIC) 7 5 mg tablet; Take 1 tablet (7 5 mg total) by mouth daily    Essential hypertension  -     metoprolol succinate (TOPROL-XL) 50 mg 24 hr tablet; Take 1 tablet (50 mg total) by mouth daily    Benign prostatic hyperplasia without lower urinary tract symptoms  -     tamsulosin (FLOMAX) 0 4 mg; Take 1 capsule (0 4 mg total) by mouth daily    Hypercholesterolemia  -     simvastatin (ZOCOR) 40 mg tablet; Take 1 tablet (40 mg total) by mouth daily at bedtime    Gastroesophageal reflux disease without esophagitis  -     pantoprazole (PROTONIX) 40 mg tablet; Take 1 tablet (40 mg total) by mouth daily    Other orders  -     Discontinue: pantoprazole (PROTONIX) 40 mg tablet; Take by mouth          Subjective:      Patient ID: Lito Chirinos is a 77 y o  male  Patient presents with a two week hx of left hip pain  It effects his left pelvis and radiates to his upper thigh  It can be as high as a 9/10  He says its worse when he sleep on that side  He says this is similar to the pain he had when his right hip required thr  He takes         The following portions of the patient's history were reviewed and updated as appropriate: allergies, current medications, past family history, past medical history, past social history, past surgical history and problem list     Review of Systems   Constitutional: Negative for activity change, appetite change, chills, diaphoresis, fatigue, fever and unexpected weight change  HENT: Negative for congestion, dental problem, drooling, ear discharge, ear pain, facial swelling, hearing loss, mouth sores, nosebleeds, postnasal drip, rhinorrhea, sinus pain, sinus pressure, sneezing, sore throat, tinnitus, trouble swallowing and voice change      Eyes: Negative for photophobia, pain, discharge, redness, itching and visual disturbance  Respiratory: Negative for apnea, cough, choking, chest tightness, shortness of breath, wheezing and stridor  Cardiovascular: Negative for chest pain, palpitations and leg swelling  Gastrointestinal: Negative for abdominal distention, abdominal pain, anal bleeding, blood in stool, constipation, diarrhea, nausea, rectal pain and vomiting  Endocrine: Negative for cold intolerance, heat intolerance, polydipsia, polyphagia and polyuria  Genitourinary: Negative for decreased urine volume, difficulty urinating, dysuria, enuresis, flank pain, frequency, genital sores, hematuria and urgency  Musculoskeletal: Positive for arthralgias  Negative for back pain, gait problem, joint swelling, myalgias, neck pain and neck stiffness  Skin: Negative for color change, pallor, rash and wound  Allergic/Immunologic: Negative for environmental allergies, food allergies and immunocompromised state  Neurological: Negative for dizziness, tremors, seizures, syncope, facial asymmetry, speech difficulty, weakness, light-headedness, numbness and headaches  Hematological: Negative for adenopathy  Does not bruise/bleed easily  Psychiatric/Behavioral: Negative for agitation, self-injury, sleep disturbance and suicidal ideas  The patient is not nervous/anxious  Objective:      /66 (BP Location: Left arm, Patient Position: Sitting, Cuff Size: Standard)   Pulse 74   Wt 76 1 kg (167 lb 12 8 oz)   SpO2 97%   BMI 26 28 kg/m²          Physical Exam   Musculoskeletal:   There is from in the left hip  He is tender with palpation of the hip jt and with internal rotation of the hip

## 2018-11-01 ENCOUNTER — HOSPITAL ENCOUNTER (OUTPATIENT)
Dept: RADIOLOGY | Facility: HOSPITAL | Age: 78
Discharge: HOME | End: 2018-11-01
Attending: INTERNAL MEDICINE
Payer: MEDICARE

## 2018-11-01 DIAGNOSIS — M25.552 LEFT HIP PAIN: ICD-10-CM

## 2018-11-01 PROCEDURE — 73502 X-RAY EXAM HIP UNI 2-3 VIEWS: CPT

## 2018-11-13 ENCOUNTER — OFFICE VISIT (OUTPATIENT)
Dept: INTERNAL MEDICINE CLINIC | Facility: CLINIC | Age: 78
End: 2018-11-13
Payer: MEDICARE

## 2018-11-13 VITALS
OXYGEN SATURATION: 96 % | SYSTOLIC BLOOD PRESSURE: 128 MMHG | HEIGHT: 67 IN | DIASTOLIC BLOOD PRESSURE: 62 MMHG | BODY MASS INDEX: 26.02 KG/M2 | HEART RATE: 78 BPM | WEIGHT: 165.8 LBS

## 2018-11-13 DIAGNOSIS — M25.552 LEFT HIP PAIN: Primary | ICD-10-CM

## 2018-11-13 PROCEDURE — 99213 OFFICE O/P EST LOW 20 MIN: CPT | Performed by: INTERNAL MEDICINE

## 2018-11-13 NOTE — PROGRESS NOTES
Assessment/Plan:    Left hip pain  Moderate right oa  The left hip prosthesis is well seated  Diagnoses and all orders for this visit:    Left hip pain          Subjective:      Patient ID: Phyllis Craig is a 77 y o  male  Patient presents in follow up for his right hip pain  He says his pain has resolved  The following portions of the patient's history were reviewed and updated as appropriate: allergies, current medications, past family history, past medical history, past social history, past surgical history and problem list     Review of Systems   Constitutional: Negative for activity change, appetite change, chills, diaphoresis, fatigue, fever and unexpected weight change  HENT: Negative for congestion, dental problem, drooling, ear discharge, ear pain, facial swelling, hearing loss, mouth sores, nosebleeds, postnasal drip, rhinorrhea, sinus pain, sinus pressure, sneezing, sore throat, tinnitus, trouble swallowing and voice change  Eyes: Negative for photophobia, pain, discharge, redness, itching and visual disturbance  Respiratory: Negative for apnea, cough, choking, chest tightness, shortness of breath, wheezing and stridor  Cardiovascular: Negative for chest pain, palpitations and leg swelling  Gastrointestinal: Negative for abdominal distention, abdominal pain, anal bleeding, blood in stool, constipation, diarrhea, nausea, rectal pain and vomiting  Endocrine: Negative for cold intolerance, heat intolerance, polydipsia, polyphagia and polyuria  Genitourinary: Negative for decreased urine volume, difficulty urinating, dysuria, enuresis, flank pain, frequency, genital sores, hematuria and urgency  Musculoskeletal: Negative for arthralgias, back pain, gait problem, joint swelling, myalgias, neck pain and neck stiffness  Skin: Negative for color change, pallor, rash and wound     Allergic/Immunologic: Negative for environmental allergies, food allergies and immunocompromised state  Neurological: Negative for dizziness, tremors, seizures, syncope, facial asymmetry, speech difficulty, weakness, light-headedness, numbness and headaches  Hematological: Negative for adenopathy  Does not bruise/bleed easily  Psychiatric/Behavioral: Negative for agitation, self-injury, sleep disturbance and suicidal ideas  The patient is not nervous/anxious  Objective:      /62   Pulse 78   Ht 5' 7" (1 702 m)   Wt 75 2 kg (165 lb 12 8 oz)   SpO2 96%   BMI 25 97 kg/m²          Physical Exam   HENT:   Head: Normocephalic and atraumatic  Eyes: Pupils are equal, round, and reactive to light  Conjunctivae and EOM are normal  Right eye exhibits no discharge  Left eye exhibits no discharge  Neck: Normal range of motion  Neck supple  No JVD present  No tracheal deviation present  No thyromegaly present  Cardiovascular: Normal rate, regular rhythm, normal heart sounds and intact distal pulses  Exam reveals no gallop and no friction rub  No murmur heard  Pulmonary/Chest: Effort normal and breath sounds normal  No respiratory distress  He has no wheezes  He has no rales  He exhibits no tenderness  Abdominal: Soft  Bowel sounds are normal  He exhibits no distension and no mass  There is no tenderness  There is no rebound and no guarding  Musculoskeletal: Normal range of motion  He exhibits no edema, tenderness or deformity

## 2018-12-06 ENCOUNTER — OFFICE VISIT (OUTPATIENT)
Dept: CARDIOLOGY CLINIC | Facility: CLINIC | Age: 78
End: 2018-12-06
Payer: MEDICARE

## 2018-12-06 VITALS
HEART RATE: 80 BPM | HEIGHT: 67 IN | WEIGHT: 164 LBS | SYSTOLIC BLOOD PRESSURE: 110 MMHG | OXYGEN SATURATION: 95 % | BODY MASS INDEX: 25.74 KG/M2 | DIASTOLIC BLOOD PRESSURE: 58 MMHG

## 2018-12-06 DIAGNOSIS — I25.10 ATHEROSCLEROSIS OF NATIVE CORONARY ARTERY OF NATIVE HEART WITHOUT ANGINA PECTORIS: Primary | ICD-10-CM

## 2018-12-06 DIAGNOSIS — I10 ESSENTIAL HYPERTENSION: ICD-10-CM

## 2018-12-06 DIAGNOSIS — I65.21 STENOSIS OF RIGHT CAROTID ARTERY: ICD-10-CM

## 2018-12-06 PROCEDURE — 99213 OFFICE O/P EST LOW 20 MIN: CPT | Performed by: INTERNAL MEDICINE

## 2018-12-06 NOTE — PROGRESS NOTES
[facility]  [address]  [facility] [address]  Cardiology Follow Up     [facility]  1940  [id number]      1  Atherosclerosis of native coronary artery of native heart without angina pectoris     2  Essential hypertension     3  Stenosis of right carotid artery         Chief Complaint   Patient presents with    Follow-up       Interval History:  Patient presents for follow-up visit  Patient denies any history of chest pain shortness of breath  Patient denies any history of leg edema or orthopnea PND  No history of presyncope syncope  Patient states compliance with the present list of medications  Patient has a vascular physician follow-up in [place] for history of carotid endarterectomy  Patient Active Problem List   Diagnosis    Hyperlipidemia    Chronic nonintractable headache    Pulmonary emphysema (HCC)    Benign prostatic hyperplasia without lower urinary tract symptoms    Sinusitis, maxillary, chronic    Carotid artery stenosis    Hx of CABG    Atherosclerosis of coronary artery    Essential hypertension    Abnormal CT scan, gastrointestinal tract    Back pain    Benign localized prostatic hyperplasia with lower urinary tract symptoms (LUTS)    Carotid bruit    Cataract    Cervical radiculopathy    Chronic left shoulder pain    Chronic sphenoidal sinusitis    Esophageal reflux    Foraminal stenosis of thoracic region    Impaired fasting glucose    Inguinal hernia    Memory loss    Nephrolithiasis    Oropharyngeal dysphagia    Osteoarthritis of hip    Panlobular emphysema (HCC)    PVD (peripheral vascular disease) ([facility] [address] )    Screening for prostate cancer    Screen for colon cancer    Left hip pain     Past Medical History:   Diagnosis Date    CAD (coronary artery disease)     Cervicogenic headache     Hx of CABG     Hyperlipemia     Hypertension     Renal lesion      Social History     Social History    Marital status:   Spouse name: N/A    Number of children: 2    Years of education: N/A     Occupational History    Not on file       Social History Main Topics    Smoking status: Former Smoker    Smokeless tobacco: Never Used    Alcohol use 0 6 oz/week     1 Glasses of wine per week      Comment: social     Drug use: No    Sexual activity: Not on file     Other Topics Concern    Not on file     Social History Narrative    Caffeine use      Family History   Problem Relation Age of Onset    Nephrolithiasis Mother     Heart attack Father     Stroke Father      Past Surgical History:   Procedure Laterality Date    CAROTID ENDARTERECTOMY Right     CORONARY ARTERY BYPASS GRAFT  2011    HERNIA REPAIR      LITHOTRIPSY      TOTAL HIP ARTHROPLASTY         Current Outpatient Prescriptions:     Ascorbic Acid (VITAMIN C) 100 MG tablet, Take 1 tablet by mouth daily, Disp: , Rfl:     aspirin (ECOTRIN LOW STRENGTH) 81 mg EC tablet, Take 3 tablets by mouth daily  , Disp: , Rfl:     fluticasone (FLONASE) 50 mcg/act nasal spray, 2 sprays into each nostril daily, Disp: 16 g, Rfl: 5    metoprolol succinate (TOPROL-XL) 50 mg 24 hr tablet, Take 1 tablet (50 mg total) by mouth daily, Disp: 90 tablet, Rfl: 2    nitroglycerin (NITROSTAT) 0 4 mg SL tablet, Place under the tongue, Disp: , Rfl:     pantoprazole (PROTONIX) 40 mg tablet, Take 1 tablet (40 mg total) by mouth daily, Disp: 90 tablet, Rfl: 2    Pediatric Multivitamins-Fl (MULTIVITAMINS/FL PO), Take 1 capsule by mouth daily, Disp: , Rfl:     simvastatin (ZOCOR) 40 mg tablet, Take 1 tablet (40 mg total) by mouth daily at bedtime, Disp: 90 tablet, Rfl: 2    tamsulosin (FLOMAX) 0 4 mg, Take 1 capsule (0 4 mg total) by mouth daily, Disp: 90 capsule, Rfl: 2    meloxicam (MOBIC) 7 5 mg tablet, Take 1 tablet (7 5 mg total) by mouth daily (Patient not taking: Reported on 11/13/2018 ), Disp: 30 tablet, Rfl: 0  Allergies   Allergen Reactions    Prednisone Diarrhea       Labs:  No visits with results within 2 Month(s) from this visit  Latest known visit with results is:   Appointment on 02/05/2018   Component Date Value    Sodium 02/05/2018 140     Potassium 02/05/2018 4 4     Chloride 02/05/2018 106     CO2 02/05/2018 27     ANION GAP 02/05/2018 7     BUN 02/05/2018 17     Creatinine 02/05/2018 0 92     Glucose, Fasting 02/05/2018 86     Calcium 02/05/2018 9 0     AST 02/05/2018 23     ALT 02/05/2018 21     Alkaline Phosphatase 02/05/2018 90     Total Protein 02/05/2018 7 4     Albumin 02/05/2018 4 1     Total Bilirubin 02/05/2018 0 58     eGFR 02/05/2018 79     Total CK 02/05/2018 106     Hemoglobin A1C 02/05/2018 5 9     EAG 02/05/2018 123     Rheumatoid Factor 02/05/2018 Negative     LINDSAY 02/05/2018 Negative     Cyclic Citrullin Peptide* 02/05/2018 2     LYME AB IGG 02/05/2018 0 12     LYME AB IGM 02/05/2018 0 18     CRP 02/05/2018 <3 0     Creatinine, Ur 02/05/2018 153 0     Microalbum  ,U,Random 02/05/2018 63 5*    Microalb Creat Ratio 02/05/2018 42*    Cholesterol 02/05/2018 129     Triglycerides 02/05/2018 162*    HDL, Direct 02/05/2018 46     LDL Calculated 02/05/2018 51      Imaging: No results found      Review of Systems:  Review of Systems   REVIEW OF SYSTEMS:  Constitutional:  Denies fever or chills   Eyes:  Denies change in visual acuity   HENT:  Denies nasal congestion or sore throat   Respiratory:  Denies cough or shortness of breath   Cardiovascular:  Denies chest pain or edema   GI:  Denies abdominal pain, nausea, vomiting, bloody stools or diarrhea   :  Denies dysuria, frequency, difficulty in micturition and nocturia  Musculoskeletal:  Denies back pain or joint pain   Neurologic:  Denies headache, focal weakness or sensory changes   Endocrine:  Denies polyuria or polydipsia   Lymphatic:  Denies swollen glands   Psychiatric:  Denies depression or anxiety     Physical Exam:    /58   Pulse 80   Ht 5' 7" (1 702 m)   Wt 74 4 kg (164 lb)   SpO2 95%   BMI 25 69 kg/m²     Physical Exam   PHYSICAL EXAM:  General:  Patient is not in acute distress   Head: Normocephalic, Atraumatic  HEENT:  Both pupils normal-size atraumatic, normocephalic, nonicteric  Neck:  JVP not raised  Trachea central  No carotid bruit  Respiratory:  normal breath sounds no crackles  no rhonchi  Cardiovascular:  Regular rate and rhythm no S3 no murmurs  GI:  Abdomen soft nontender  No organomegaly  Lymphatic:  No cervical or inguinal lymphadenopathy  Neurologic:  Patient is awake alert, oriented   Grossly nonfocal    Discussion/Summary:  Patient with multiple medical problems who seems to be doing reasonably well from cardiac standpoint  Previous studies reviewed with patient  Medications reviewed and possible side effects discussed  concepts of cardiovascular disease , signs and symptoms of heart disease  Dietary and risk factor modification reinforced  All questions answered  Safety measures reviewed  Patient advised to report any problems prompting medical attention  Previous cardiovascular studies reviewed with the patient  Symptoms to watch out from cardiac standpoint which would indicate the need for further cardiac evaluation discussed with patient  Follow-up in 6 months or earlier as needed  Follow-up with primary care physician  Patient is agreeable with the plan of care

## 2018-12-10 ENCOUNTER — APPOINTMENT (OUTPATIENT)
Dept: LAB | Facility: CLINIC | Age: 78
End: 2018-12-10
Payer: MEDICARE

## 2018-12-10 DIAGNOSIS — E78.2 MIXED HYPERLIPIDEMIA: ICD-10-CM

## 2018-12-10 DIAGNOSIS — Z12.5 SCREENING FOR PROSTATE CANCER: ICD-10-CM

## 2018-12-10 DIAGNOSIS — R73.01 IMPAIRED FASTING GLUCOSE: ICD-10-CM

## 2018-12-10 LAB
ALBUMIN SERPL BCP-MCNC: 3.6 G/DL (ref 3.5–5)
ALP SERPL-CCNC: 91 U/L (ref 46–116)
ALT SERPL W P-5'-P-CCNC: 18 U/L (ref 12–78)
ANION GAP SERPL CALCULATED.3IONS-SCNC: 5 MMOL/L (ref 4–13)
AST SERPL W P-5'-P-CCNC: 19 U/L (ref 5–45)
BILIRUB SERPL-MCNC: 0.43 MG/DL (ref 0.2–1)
BUN SERPL-MCNC: 21 MG/DL (ref 5–25)
CALCIUM SERPL-MCNC: 8.9 MG/DL (ref 8.3–10.1)
CHLORIDE SERPL-SCNC: 107 MMOL/L (ref 100–108)
CHOLEST SERPL-MCNC: 111 MG/DL (ref 50–200)
CK SERPL-CCNC: 113 U/L (ref 39–308)
CO2 SERPL-SCNC: 31 MMOL/L (ref 21–32)
CREAT SERPL-MCNC: 1.05 MG/DL (ref 0.6–1.3)
GFR SERPL CREATININE-BSD FRML MDRD: 67 ML/MIN/1.73SQ M
GLUCOSE P FAST SERPL-MCNC: 101 MG/DL (ref 65–99)
HDLC SERPL-MCNC: 42 MG/DL (ref 40–60)
LDLC SERPL CALC-MCNC: 42 MG/DL (ref 0–100)
POTASSIUM SERPL-SCNC: 4.7 MMOL/L (ref 3.5–5.3)
PROT SERPL-MCNC: 7 G/DL (ref 6.4–8.2)
PSA SERPL-MCNC: 2.4 NG/ML (ref 0–4)
SODIUM SERPL-SCNC: 143 MMOL/L (ref 136–145)
TRIGL SERPL-MCNC: 134 MG/DL

## 2018-12-10 PROCEDURE — 80061 LIPID PANEL: CPT

## 2018-12-10 PROCEDURE — 36415 COLL VENOUS BLD VENIPUNCTURE: CPT

## 2018-12-10 PROCEDURE — G0103 PSA SCREENING: HCPCS

## 2018-12-10 PROCEDURE — 80053 COMPREHEN METABOLIC PANEL: CPT

## 2018-12-10 PROCEDURE — 82550 ASSAY OF CK (CPK): CPT

## 2019-01-14 ENCOUNTER — OFFICE VISIT (OUTPATIENT)
Dept: INTERNAL MEDICINE CLINIC | Facility: CLINIC | Age: 79
End: 2019-01-14
Payer: MEDICARE

## 2019-01-14 VITALS
HEIGHT: 67 IN | DIASTOLIC BLOOD PRESSURE: 60 MMHG | SYSTOLIC BLOOD PRESSURE: 110 MMHG | OXYGEN SATURATION: 96 % | HEART RATE: 70 BPM | BODY MASS INDEX: 26.21 KG/M2 | WEIGHT: 167 LBS

## 2019-01-14 DIAGNOSIS — Z12.5 SCREENING FOR PROSTATE CANCER: ICD-10-CM

## 2019-01-14 DIAGNOSIS — I10 ESSENTIAL HYPERTENSION: ICD-10-CM

## 2019-01-14 DIAGNOSIS — R73.01 IMPAIRED FASTING GLUCOSE: Primary | ICD-10-CM

## 2019-01-14 DIAGNOSIS — K21.9 GASTROESOPHAGEAL REFLUX DISEASE WITHOUT ESOPHAGITIS: ICD-10-CM

## 2019-01-14 DIAGNOSIS — E78.2 MIXED HYPERLIPIDEMIA: ICD-10-CM

## 2019-01-14 DIAGNOSIS — J43.9 PULMONARY EMPHYSEMA, UNSPECIFIED EMPHYSEMA TYPE (HCC): ICD-10-CM

## 2019-01-14 DIAGNOSIS — Z12.11 SCREEN FOR COLON CANCER: ICD-10-CM

## 2019-01-14 PROCEDURE — 99214 OFFICE O/P EST MOD 30 MIN: CPT | Performed by: INTERNAL MEDICINE

## 2019-01-14 RX ORDER — PANTOPRAZOLE SODIUM 40 MG/1
40 TABLET, DELAYED RELEASE ORAL DAILY
Qty: 90 TABLET | Refills: 3 | Status: SHIPPED | OUTPATIENT
Start: 2019-01-14 | End: 2020-07-13 | Stop reason: SDUPTHER

## 2019-01-14 NOTE — PROGRESS NOTES
Assessment/Plan:    Esophageal reflux  Stable on pantoprazole  Renew prescription     Impaired fasting glucose  No overt dm  Check fbs a1c   Pulmonary emphysema (HCC)  Stable  Follow with pulmonary     Essential hypertension  Well controlled  Bmp wnl    Screening for prostate cancer  psa and nate benign  Screen for colon cancer  Fit ordered  Hyperlipidemia  ldl at goal ck and lfts wnl  Diagnoses and all orders for this visit:    Impaired fasting glucose  -     Comprehensive metabolic panel; Future  -     Hemoglobin A1C; Future    Gastroesophageal reflux disease without esophagitis  -     pantoprazole (PROTONIX) 40 mg tablet; Take 1 tablet (40 mg total) by mouth daily    Pulmonary emphysema, unspecified emphysema type ([address] )    Essential hypertension  -     Comprehensive metabolic panel; Future    Mixed hyperlipidemia  -     Comprehensive metabolic panel; Future  -     CK; Future  -     Lipid Panel with Direct LDL reflex; Future    Screening for prostate cancer    Screen for colon cancer  -     Occult Blood, Fecal Immunochemical; Future          Subjective:      Patient ID: Cyndee Meigs is a 77 y o  male  Patient presents in follow up for his medical problems and to review recent lab work  He has chronic hip pain but says it is adequately controlled  The following portions of the patient's history were reviewed and updated as appropriate: allergies, current medications, past family history, past medical history, past social history, past surgical history and problem list     Review of Systems   Constitutional: Negative for activity change, appetite change, chills, diaphoresis, fatigue, fever and unexpected weight change  HENT: Negative for congestion, dental problem, drooling, ear discharge, ear pain, facial swelling, hearing loss, mouth sores, nosebleeds, postnasal drip, rhinorrhea, sinus pain, sinus pressure, sneezing, sore throat, tinnitus, trouble swallowing and voice change  Eyes: Negative for photophobia, pain, discharge, redness, itching and visual disturbance  Respiratory: Negative for apnea, cough, choking, chest tightness, shortness of breath, wheezing and stridor  Cardiovascular: Negative for chest pain, palpitations and leg swelling  Gastrointestinal: Negative for abdominal distention, abdominal pain, anal bleeding, blood in stool, constipation, diarrhea, nausea, rectal pain and vomiting  Endocrine: Negative for cold intolerance, heat intolerance, polydipsia, polyphagia and polyuria  Genitourinary: Negative for decreased urine volume, difficulty urinating, dysuria, enuresis, flank pain, frequency, genital sores, hematuria and urgency  Musculoskeletal: Negative for arthralgias, back pain, gait problem, joint swelling, myalgias, neck pain and neck stiffness  Hip pain    Skin: Negative for color change, pallor, rash and wound  Allergic/Immunologic: Negative for environmental allergies, food allergies and immunocompromised state  Neurological: Negative for dizziness, tremors, seizures, syncope, facial asymmetry, speech difficulty, weakness, light-headedness, numbness and headaches  Hematological: Negative for adenopathy  Does not bruise/bleed easily  Psychiatric/Behavioral: Negative for agitation, self-injury, sleep disturbance and suicidal ideas  The patient is not nervous/anxious  Objective:      /60   Pulse 70   Ht 5' 7" (1 702 m)   Wt 75 8 kg (167 lb)   SpO2 96%   BMI 26 16 kg/m²          Physical Exam   Constitutional: He is oriented to person, place, and time  He appears well-developed and well-nourished  No distress  HENT:   Head: Normocephalic and atraumatic  Right Ear: External ear normal    Left Ear: External ear normal    Mouth/Throat: No oropharyngeal exudate  Eyes: Pupils are equal, round, and reactive to light  Conjunctivae and EOM are normal  No scleral icterus  Neck: Normal range of motion  Neck supple   No JVD present  No tracheal deviation present  No thyromegaly present  Cardiovascular: Normal rate, regular rhythm and intact distal pulses  Exam reveals no gallop and no friction rub  No murmur heard  Pulmonary/Chest: Effort normal and breath sounds normal  No respiratory distress  He has no wheezes  He has no rales  He exhibits no tenderness  Abdominal: Soft  Bowel sounds are normal  He exhibits no distension and no mass  There is no tenderness  There is no rebound and no guarding  Genitourinary:   Genitourinary Comments: Nemg  Testes descended bilaterally without masses  No penile discharge or masses  Nl rectal tone  The prostate is small and smooth and without masses  Musculoskeletal: Normal range of motion  He exhibits no edema, tenderness or deformity  Lymphadenopathy:     He has no cervical adenopathy  Neurological: He is alert and oriented to person, place, and time  He has normal reflexes  No cranial nerve deficit  He exhibits normal muscle tone  Coordination normal    Skin: Skin is warm and dry  No rash noted  He is not diaphoretic  No erythema  No pallor  Psychiatric: He has a normal mood and affect   His behavior is normal  Judgment and thought content normal

## 2019-03-18 ENCOUNTER — APPOINTMENT (OUTPATIENT)
Dept: VASCULAR SURGERY | Facility: CLINIC | Age: 79
End: 2019-03-18
Payer: MEDICARE

## 2019-03-18 PROCEDURE — 93880 EXTRACRANIAL BILAT STUDY: CPT

## 2019-03-18 PROCEDURE — 99213 OFFICE O/P EST LOW 20 MIN: CPT

## 2019-03-18 NOTE — PHYSICAL EXAM
[2+] : left 2+ [No Rash or Lesion] : No rash or lesion [Calm] : calm [JVD] : no jugular venous distention  [Ankle Swelling (On Exam)] : not present [Varicose Veins Of Lower Extremities] : not present [] : not present [de-identified] : pleasant

## 2019-03-18 NOTE — PROCEDURE
[FreeTextEntry1] :  Carotid ultrasound with good flow in the CEA\par Right CEA flow okay\par Left <50% stenosis

## 2019-03-18 NOTE — ADDENDUM
[FreeTextEntry1] : This note was written by Bhavya Horner on 03/18/2019  acting as scribe for Dr. David.

## 2019-03-18 NOTE — ASSESSMENT
[FreeTextEntry1] : 80 y/o M s/p R CEA in 3/2017. Doing well. Asymptomatic. Carotid ultrasound with good flow in the R CEA and with <50% stenosis in left. \par Follow up in 1 year.

## 2019-03-18 NOTE — END OF VISIT
[FreeTextEntry3] : All medical record entries made by the Scribe were at my, Dr. Youssef's, discretion and personally dictated by me on 03/18/2019. I have reviewed the chart and agree that the record accurately reflects my personal performance of the history, physical exam, assessment and plan. I have also personally directed, reviewed and agreed to the chart.

## 2019-04-25 ENCOUNTER — TELEPHONE (OUTPATIENT)
Dept: INTERNAL MEDICINE CLINIC | Facility: CLINIC | Age: 79
End: 2019-04-25

## 2019-04-25 ENCOUNTER — APPOINTMENT (OUTPATIENT)
Dept: LAB | Facility: CLINIC | Age: 79
End: 2019-04-25
Payer: MEDICARE

## 2019-04-25 DIAGNOSIS — I10 ESSENTIAL HYPERTENSION: Primary | ICD-10-CM

## 2019-04-25 DIAGNOSIS — R73.01 IMPAIRED FASTING GLUCOSE: ICD-10-CM

## 2019-04-25 DIAGNOSIS — E78.2 MIXED HYPERLIPIDEMIA: ICD-10-CM

## 2019-04-25 DIAGNOSIS — I10 ESSENTIAL HYPERTENSION: ICD-10-CM

## 2019-04-25 DIAGNOSIS — R73.01 IMPAIRED FASTING GLUCOSE: Primary | ICD-10-CM

## 2019-04-25 PROBLEM — Z12.5 SCREENING FOR PROSTATE CANCER: Status: RESOLVED | Noted: 2018-06-05 | Resolved: 2019-04-25

## 2019-04-25 PROBLEM — Z12.11 SCREEN FOR COLON CANCER: Status: RESOLVED | Noted: 2018-06-05 | Resolved: 2019-04-25

## 2019-04-25 PROBLEM — M25.552 LEFT HIP PAIN: Status: RESOLVED | Noted: 2018-10-29 | Resolved: 2019-04-25

## 2019-04-25 LAB
ALBUMIN SERPL BCP-MCNC: 3.9 G/DL (ref 3.5–5)
ALP SERPL-CCNC: 87 U/L (ref 46–116)
ALT SERPL W P-5'-P-CCNC: 20 U/L (ref 12–78)
ANION GAP SERPL CALCULATED.3IONS-SCNC: 4 MMOL/L (ref 4–13)
AST SERPL W P-5'-P-CCNC: 21 U/L (ref 5–45)
BILIRUB SERPL-MCNC: 0.73 MG/DL (ref 0.2–1)
BUN SERPL-MCNC: 18 MG/DL (ref 5–25)
CALCIUM SERPL-MCNC: 8.6 MG/DL (ref 8.3–10.1)
CHLORIDE SERPL-SCNC: 111 MMOL/L (ref 100–108)
CHOLEST SERPL-MCNC: 130 MG/DL (ref 50–200)
CK SERPL-CCNC: 125 U/L (ref 39–308)
CO2 SERPL-SCNC: 28 MMOL/L (ref 21–32)
CREAT SERPL-MCNC: 0.96 MG/DL (ref 0.6–1.3)
ERYTHROCYTE [DISTWIDTH] IN BLOOD BY AUTOMATED COUNT: 13.6 % (ref 11.6–15.1)
EST. AVERAGE GLUCOSE BLD GHB EST-MCNC: 128 MG/DL
GFR SERPL CREATININE-BSD FRML MDRD: 74 ML/MIN/1.73SQ M
GLUCOSE P FAST SERPL-MCNC: 97 MG/DL (ref 65–99)
HBA1C MFR BLD: 6.1 % (ref 4.2–6.3)
HCT VFR BLD AUTO: 48.5 % (ref 36.5–49.3)
HDLC SERPL-MCNC: 49 MG/DL (ref 40–60)
HGB BLD-MCNC: 15.8 G/DL (ref 12–17)
LDLC SERPL CALC-MCNC: 58 MG/DL (ref 0–100)
MCH RBC QN AUTO: 33.3 PG (ref 26.8–34.3)
MCHC RBC AUTO-ENTMCNC: 32.6 G/DL (ref 31.4–37.4)
MCV RBC AUTO: 102 FL (ref 82–98)
PLATELET # BLD AUTO: 201 THOUSANDS/UL (ref 149–390)
PMV BLD AUTO: 10.5 FL (ref 8.9–12.7)
POTASSIUM SERPL-SCNC: 4.5 MMOL/L (ref 3.5–5.3)
PROT SERPL-MCNC: 7.1 G/DL (ref 6.4–8.2)
RBC # BLD AUTO: 4.75 MILLION/UL (ref 3.88–5.62)
SODIUM SERPL-SCNC: 143 MMOL/L (ref 136–145)
TRIGL SERPL-MCNC: 113 MG/DL
WBC # BLD AUTO: 6.12 THOUSAND/UL (ref 4.31–10.16)

## 2019-04-25 PROCEDURE — 83036 HEMOGLOBIN GLYCOSYLATED A1C: CPT

## 2019-04-25 PROCEDURE — 80053 COMPREHEN METABOLIC PANEL: CPT

## 2019-04-25 PROCEDURE — 80061 LIPID PANEL: CPT

## 2019-04-25 PROCEDURE — 36415 COLL VENOUS BLD VENIPUNCTURE: CPT

## 2019-04-25 PROCEDURE — 85027 COMPLETE CBC AUTOMATED: CPT

## 2019-04-25 PROCEDURE — 82550 ASSAY OF CK (CPK): CPT

## 2019-04-30 ENCOUNTER — OFFICE VISIT (OUTPATIENT)
Dept: INTERNAL MEDICINE CLINIC | Facility: CLINIC | Age: 79
End: 2019-04-30
Payer: MEDICARE

## 2019-04-30 VITALS
WEIGHT: 159.8 LBS | HEIGHT: 67 IN | DIASTOLIC BLOOD PRESSURE: 72 MMHG | OXYGEN SATURATION: 98 % | HEART RATE: 72 BPM | BODY MASS INDEX: 25.08 KG/M2 | SYSTOLIC BLOOD PRESSURE: 126 MMHG

## 2019-04-30 DIAGNOSIS — N40.0 BENIGN PROSTATIC HYPERPLASIA WITHOUT LOWER URINARY TRACT SYMPTOMS: ICD-10-CM

## 2019-04-30 DIAGNOSIS — E78.00 HYPERCHOLESTEROLEMIA: ICD-10-CM

## 2019-04-30 DIAGNOSIS — R73.01 IMPAIRED FASTING GLUCOSE: ICD-10-CM

## 2019-04-30 DIAGNOSIS — J32.3 CHRONIC SPHENOIDAL SINUSITIS: ICD-10-CM

## 2019-04-30 DIAGNOSIS — Z23 ENCOUNTER FOR IMMUNIZATION: ICD-10-CM

## 2019-04-30 DIAGNOSIS — I10 ESSENTIAL HYPERTENSION: Primary | ICD-10-CM

## 2019-04-30 DIAGNOSIS — J43.9 PULMONARY EMPHYSEMA, UNSPECIFIED EMPHYSEMA TYPE (HCC): Chronic | ICD-10-CM

## 2019-04-30 PROBLEM — M25.512 CHRONIC LEFT SHOULDER PAIN: Status: RESOLVED | Noted: 2017-01-23 | Resolved: 2019-04-30

## 2019-04-30 PROBLEM — E78.5 HYPERLIPIDEMIA: Chronic | Status: ACTIVE | Noted: 2018-02-05

## 2019-04-30 PROBLEM — R13.12 OROPHARYNGEAL DYSPHAGIA: Status: RESOLVED | Noted: 2018-06-04 | Resolved: 2019-04-30

## 2019-04-30 PROBLEM — G89.29 CHRONIC LEFT SHOULDER PAIN: Status: RESOLVED | Noted: 2017-01-23 | Resolved: 2019-04-30

## 2019-04-30 PROBLEM — J43.1 PANLOBULAR EMPHYSEMA (HCC): Status: RESOLVED | Noted: 2018-06-04 | Resolved: 2019-04-30

## 2019-04-30 PROBLEM — J32.0 SINUSITIS, MAXILLARY, CHRONIC: Status: RESOLVED | Noted: 2018-02-05 | Resolved: 2019-04-30

## 2019-04-30 PROCEDURE — G0009 ADMIN PNEUMOCOCCAL VACCINE: HCPCS | Performed by: INTERNAL MEDICINE

## 2019-04-30 PROCEDURE — 90732 PPSV23 VACC 2 YRS+ SUBQ/IM: CPT | Performed by: INTERNAL MEDICINE

## 2019-04-30 PROCEDURE — 99214 OFFICE O/P EST MOD 30 MIN: CPT | Performed by: INTERNAL MEDICINE

## 2019-04-30 RX ORDER — METOPROLOL SUCCINATE 50 MG/1
50 TABLET, EXTENDED RELEASE ORAL DAILY
Qty: 90 TABLET | Refills: 2 | Status: SHIPPED | OUTPATIENT
Start: 2019-04-30 | End: 2022-03-10 | Stop reason: SDUPTHER

## 2019-04-30 RX ORDER — MECLIZINE HYDROCHLORIDE 25 MG/1
25 TABLET ORAL AS NEEDED
Refills: 0 | COMMUNITY
Start: 2019-04-25 | End: 2021-10-13 | Stop reason: ALTCHOICE

## 2019-04-30 RX ORDER — ALBUTEROL SULFATE 90 UG/1
1 AEROSOL, METERED RESPIRATORY (INHALATION)
COMMUNITY
Start: 2018-03-05 | End: 2019-05-13 | Stop reason: SDUPTHER

## 2019-04-30 RX ORDER — AZELASTINE 1 MG/ML
1-2 SPRAY, METERED NASAL 2 TIMES DAILY
Qty: 30 ML | Refills: 5 | Status: SHIPPED | OUTPATIENT
Start: 2019-04-30

## 2019-04-30 RX ORDER — TAMSULOSIN HYDROCHLORIDE 0.4 MG/1
0.4 CAPSULE ORAL DAILY
Qty: 90 CAPSULE | Refills: 2 | Status: SHIPPED | OUTPATIENT
Start: 2019-04-30 | End: 2020-03-03 | Stop reason: SDUPTHER

## 2019-04-30 RX ORDER — SIMVASTATIN 40 MG
40 TABLET ORAL
Qty: 90 TABLET | Refills: 2 | Status: SHIPPED | OUTPATIENT
Start: 2019-04-30 | End: 2020-03-03 | Stop reason: SDUPTHER

## 2019-04-30 RX ORDER — AZELASTINE 1 MG/ML
1-2 SPRAY, METERED NASAL 2 TIMES DAILY
COMMUNITY
Start: 2018-10-23 | End: 2019-04-30 | Stop reason: SDUPTHER

## 2019-05-13 DIAGNOSIS — J43.1 PANLOBULAR EMPHYSEMA (HCC): ICD-10-CM

## 2019-05-13 DIAGNOSIS — J45.20 MILD INTERMITTENT ASTHMA WITHOUT COMPLICATION: Primary | ICD-10-CM

## 2019-05-13 RX ORDER — ALBUTEROL SULFATE 90 UG/1
1 AEROSOL, METERED RESPIRATORY (INHALATION) EVERY 4 HOURS PRN
Qty: 1 INHALER | Refills: 4 | Status: SHIPPED | OUTPATIENT
Start: 2019-05-13 | End: 2019-06-12 | Stop reason: ALTCHOICE

## 2019-07-29 ENCOUNTER — OFFICE VISIT (OUTPATIENT)
Dept: CARDIOLOGY CLINIC | Facility: CLINIC | Age: 79
End: 2019-07-29
Payer: MEDICARE

## 2019-07-29 VITALS
HEART RATE: 80 BPM | DIASTOLIC BLOOD PRESSURE: 60 MMHG | HEIGHT: 67 IN | BODY MASS INDEX: 25.11 KG/M2 | OXYGEN SATURATION: 98 % | WEIGHT: 160 LBS | SYSTOLIC BLOOD PRESSURE: 112 MMHG

## 2019-07-29 DIAGNOSIS — I10 ESSENTIAL HYPERTENSION: ICD-10-CM

## 2019-07-29 DIAGNOSIS — I25.10 ATHEROSCLEROSIS OF NATIVE CORONARY ARTERY OF NATIVE HEART WITHOUT ANGINA PECTORIS: Primary | ICD-10-CM

## 2019-07-29 DIAGNOSIS — R06.02 SHORTNESS OF BREATH: ICD-10-CM

## 2019-07-29 DIAGNOSIS — E78.00 HYPERCHOLESTEREMIA: ICD-10-CM

## 2019-07-29 PROCEDURE — 99214 OFFICE O/P EST MOD 30 MIN: CPT | Performed by: INTERNAL MEDICINE

## 2019-07-29 NOTE — PROGRESS NOTES
[facility]  [address] [place] [id number]  Cardiology Follow Up    Dillon Olivia  1940  [id number]      1  Atherosclerosis of native coronary artery of native heart without angina pectoris     2  Essential hypertension     3  Hypercholesteremia         Chief Complaint   Patient presents with    Follow-up     At times he gets chest tightness when he falls asleep or overexerts himself    Shortness of Breath       Interval History:   Patient presents for follow-up visit  Patient does have symptoms of shortness of breath and chest pain with exertion  No recent cardiac functional evaluation  He states that he has been compliant with all his present medications  He denies any history of palpitations or dizziness  No history of presyncope syncope  Patient Active Problem List   Diagnosis    Hyperlipidemia    Chronic nonintractable headache    Pulmonary emphysema (HCC)    Benign prostatic hyperplasia without lower urinary tract symptoms    Carotid artery stenosis    Hx of CABG    Atherosclerosis of coronary artery    Essential hypertension    Cataract    Cervical radiculopathy    Chronic sphenoidal sinusitis    Esophageal reflux    Foraminal stenosis of thoracic region    Impaired fasting glucose    Osteoarthritis of hip     Past Medical History:   Diagnosis Date    CAD (coronary artery disease)     Cervicogenic headache     Hx of CABG     Hyperlipemia     Hypertension     Renal lesion      Social History     Socioeconomic History    Marital status:       Spouse name: Not on file    Number of children: 2    Years of education: Not on file    Highest education level: Not on file   Occupational History    Not on file   Social Needs    Financial resource strain: Not on file    Food insecurity:     Worry: Not on file     Inability: Not on file    Transportation needs:     Medical: Not on file     Non-medical: Not on file   Tobacco Use    Smoking status: Former Smoker     Packs/day: 1 00     Years: 60 00     Pack years: 60 00     Types: Cigarettes     Last attempt to quit: 2012     Years since quittin 5    Smokeless tobacco: Never Used   Substance and Sexual Activity    Alcohol use: Yes     Alcohol/week: 1 0 standard drinks     Types: 1 Shots of liquor per week     Frequency: 2-3 times a week     Drinks per session: 1 or 2     Binge frequency: Never     Comment: social shot once a week    Drug use: No    Sexual activity: Yes     Partners: Female   Lifestyle    Physical activity:     Days per week: 4 days     Minutes per session: 150+ min    Stress:  Only a little   Relationships    Social connections:     Talks on phone: Not on file     Gets together: Not on file     Attends Yarsanism service: Not on file     Active member of club or organization: Not on file     Attends meetings of clubs or organizations: Not on file     Relationship status: Not on file    Intimate partner violence:     Fear of current or ex partner: Not on file     Emotionally abused: Not on file     Physically abused: Not on file     Forced sexual activity: Not on file   Other Topics Concern    Not on file   Social History Narrative    Caffeine use      Family History   Problem Relation Age of Onset    Nephrolithiasis Mother     Heart attack Father     Stroke Father      Past Surgical History:   Procedure Laterality Date    CAROTID ENDARTERECTOMY Right     CORONARY ARTERY BYPASS GRAFT      HERNIA REPAIR      LITHOTRIPSY      TOTAL HIP ARTHROPLASTY         Current Outpatient Medications:     Ascorbic Acid (VITAMIN C) 100 MG tablet, Take 1 tablet by mouth daily, Disp: , Rfl:     aspirin (ECOTRIN LOW STRENGTH) 81 mg EC tablet, Take 2 tablets by mouth daily , Disp: , Rfl:     azelastine (ASTELIN) 0 1 % nasal spray, 1-2 sprays into each nostril 2 (two) times a day, Disp: 30 mL, Rfl: 5    B Complex Vitamins (VITAMIN B COMPLEX PO), Take 1 tablet by mouth daily, Disp: , Rfl:     fluticasone (FLONASE) 50 mcg/act nasal spray, 2 sprays into each nostril daily, Disp: 16 g, Rfl: 5    metoprolol succinate (TOPROL-XL) 50 mg 24 hr tablet, Take 1 tablet (50 mg total) by mouth daily, Disp: 90 tablet, Rfl: 2    nitroglycerin (NITROSTAT) 0 4 mg SL tablet, Place 0 4 mg under the tongue as needed , Disp: , Rfl:     pantoprazole (PROTONIX) 40 mg tablet, Take 1 tablet (40 mg total) by mouth daily, Disp: 90 tablet, Rfl: 3    Pediatric Multivitamins-Fl (MULTIVITAMINS/FL PO), Take 1 capsule by mouth daily, Disp: , Rfl:     simvastatin (ZOCOR) 40 mg tablet, Take 1 tablet (40 mg total) by mouth daily at bedtime, Disp: 90 tablet, Rfl: 2    tamsulosin (FLOMAX) 0 4 mg, Take 1 capsule (0 4 mg total) by mouth daily, Disp: 90 capsule, Rfl: 2    meclizine (ANTIVERT) 25 mg tablet, Take 25 mg by mouth as needed , Disp: , Rfl: 0  Allergies   Allergen Reactions    Prednisone Other (See Comments)     Constipation         Labs:  No visits with results within 2 Month(s) from this visit     Latest known visit with results is:   Appointment on 04/25/2019   Component Date Value    Hemoglobin A1C 04/25/2019 6 1     EAG 04/25/2019 128     Cholesterol 04/25/2019 130     Triglycerides 04/25/2019 113     HDL, Direct 04/25/2019 49     LDL Calculated 04/25/2019 58     Sodium 04/25/2019 143     Potassium 04/25/2019 4 5     Chloride 04/25/2019 111*    CO2 04/25/2019 28     ANION GAP 04/25/2019 4     BUN 04/25/2019 18     Creatinine 04/25/2019 0 96     Glucose, Fasting 04/25/2019 97     Calcium 04/25/2019 8 6     AST 04/25/2019 21     ALT 04/25/2019 20     Alkaline Phosphatase 04/25/2019 87     Total Protein 04/25/2019 7 1     Albumin 04/25/2019 3 9     Total Bilirubin 04/25/2019 0 73     eGFR 04/25/2019 74     WBC 04/25/2019 6 12     RBC 04/25/2019 4 75     Hemoglobin 04/25/2019 15 8     Hematocrit 04/25/2019 48 5     MCV 04/25/2019 102*    MCH 04/25/2019 33 3     [facility]     RDW 04/25/2019 13 6     Platelets 72/77/5880 201     MPV 04/25/2019 10 5     Total CK 04/25/2019 125      Imaging: No results found  Review of Systems:  Review of Systems     REVIEW OF SYSTEMS:  Constitutional:  Denies fever or chills   Eyes:  Denies change in visual acuity   HENT:  Denies nasal congestion or sore throat   Respiratory:  shortness of breath   Cardiovascular:   chest pain  GI:  Denies abdominal pain, nausea, vomiting, bloody stools or diarrhea   :  Denies dysuria, frequency, difficulty in micturition and nocturia  Musculoskeletal:  Denies back pain or joint pain   Neurologic:  Denies headache, focal weakness or sensory changes   Endocrine:  Denies polyuria or polydipsia   Lymphatic:  Denies swollen glands   Psychiatric:  Denies depression or anxiety     Physical Exam:    /60   Pulse 80   Ht 5' 7 25" (1 708 m)   Wt 72 6 kg (160 lb)   SpO2 98%   BMI 24 87 kg/m²     Physical Exam   PHYSICAL EXAM:  General:  Patient is not in acute distress   Head: Normocephalic, Atraumatic  HEENT:  Both pupils normal-size atraumatic, normocephalic, nonicteric  Neck:  JVP not raised  Trachea central  No carotid bruit  Respiratory:  normal breath sounds no crackles  no rhonchi  Cardiovascular:  Regular rate and rhythm no S3 no murmurs  GI:  Abdomen soft nontender  No organomegaly  Lymphatic:  No cervical or inguinal lymphadenopathy  Neurologic:  Patient is awake alert, oriented   Grossly nonfocal    Discussion/Summary:   Patient with known history of coronary artery disease with symptoms of chest pain and shortness of breath with exertion  Patient does have multiple risk factors for coronary artery disease  Patient will be scheduled for an exercise nuclear stress test to assess for exercise capacity as well as ischemia  Sensitivity and specificity of stress test discussed with patient    Symptoms to watch out from cardiac standpoint which would indicate the need for further cardiac evaluation including cardiac catheterization discussed  Patient does have history of carotid artery disease followed by physicians in [place]  Medications reviewed  Follow-up in a few months or earlier as needed  Patient is agreeable with the plan of care

## 2019-10-07 ENCOUNTER — TELEPHONE (OUTPATIENT)
Dept: CARDIOLOGY CLINIC | Facility: CLINIC | Age: 79
End: 2019-10-07

## 2019-10-07 ENCOUNTER — HOSPITAL ENCOUNTER (OUTPATIENT)
Dept: NON INVASIVE DIAGNOSTICS | Facility: CLINIC | Age: 79
Discharge: HOME | End: 2019-10-07
Payer: MEDICARE

## 2019-10-07 ENCOUNTER — HOSPITAL ENCOUNTER (OUTPATIENT)
Dept: NON INVASIVE DIAGNOSTICS | Facility: CLINIC | Age: 79
Discharge: HOME | End: 2019-10-07

## 2019-10-07 DIAGNOSIS — I25.10 ATHEROSCLEROSIS OF NATIVE CORONARY ARTERY OF NATIVE HEART WITHOUT ANGINA PECTORIS: ICD-10-CM

## 2019-10-07 DIAGNOSIS — R06.02 SHORTNESS OF BREATH: ICD-10-CM

## 2019-10-07 PROCEDURE — 93018 CV STRESS TEST I&R ONLY: CPT | Performed by: INTERNAL MEDICINE

## 2019-10-07 PROCEDURE — 93016 CV STRESS TEST SUPVJ ONLY: CPT | Performed by: INTERNAL MEDICINE

## 2019-10-07 PROCEDURE — A9502 TC99M TETROFOSMIN: HCPCS

## 2019-10-07 PROCEDURE — 78452 HT MUSCLE IMAGE SPECT MULT: CPT | Performed by: INTERNAL MEDICINE

## 2019-10-07 PROCEDURE — 78452 HT MUSCLE IMAGE SPECT MULT: CPT

## 2019-10-07 PROCEDURE — 93017 CV STRESS TEST TRACING ONLY: CPT

## 2019-10-07 NOTE — TELEPHONE ENCOUNTER
----- Message from Tamia Francisco MD sent at 10/7/2019  4:00 PM EDT -----  Please call  Stress test overall good  To report any symptoms out of the ordinary

## 2019-10-08 LAB
ARRHY DURING EX: NORMAL
CHEST PAIN STATEMENT: NORMAL
MAX DIASTOLIC BP: 60 MMHG
MAX HEART RATE: 116 BPM
MAX PREDICTED HEART RATE: 140 BPM
MAX. SYSTOLIC BP: 152 MMHG
PROTOCOL NAME: NORMAL
REASON FOR TERMINATION: NORMAL
TARGET HR FORMULA: NORMAL
TEST INDICATION: NORMAL
TIME IN EXERCISE PHASE: NORMAL

## 2019-10-31 ENCOUNTER — OFFICE VISIT (OUTPATIENT)
Dept: INTERNAL MEDICINE CLINIC | Facility: CLINIC | Age: 79
End: 2019-10-31
Payer: MEDICARE

## 2019-10-31 VITALS
DIASTOLIC BLOOD PRESSURE: 72 MMHG | WEIGHT: 163 LBS | OXYGEN SATURATION: 97 % | SYSTOLIC BLOOD PRESSURE: 112 MMHG | HEART RATE: 72 BPM | BODY MASS INDEX: 25.34 KG/M2

## 2019-10-31 DIAGNOSIS — Z00.00 MEDICARE ANNUAL WELLNESS VISIT, SUBSEQUENT: ICD-10-CM

## 2019-10-31 DIAGNOSIS — R73.01 IMPAIRED FASTING GLUCOSE: Chronic | ICD-10-CM

## 2019-10-31 DIAGNOSIS — Z23 NEED FOR INFLUENZA VACCINATION: ICD-10-CM

## 2019-10-31 DIAGNOSIS — E78.2 MIXED HYPERLIPIDEMIA: Chronic | ICD-10-CM

## 2019-10-31 DIAGNOSIS — I25.10 ATHEROSCLEROSIS OF NATIVE CORONARY ARTERY OF NATIVE HEART WITHOUT ANGINA PECTORIS: Chronic | ICD-10-CM

## 2019-10-31 DIAGNOSIS — I10 ESSENTIAL HYPERTENSION: Primary | Chronic | ICD-10-CM

## 2019-10-31 PROCEDURE — 90662 IIV NO PRSV INCREASED AG IM: CPT | Performed by: INTERNAL MEDICINE

## 2019-10-31 PROCEDURE — G0439 PPPS, SUBSEQ VISIT: HCPCS | Performed by: INTERNAL MEDICINE

## 2019-10-31 PROCEDURE — 99214 OFFICE O/P EST MOD 30 MIN: CPT | Performed by: INTERNAL MEDICINE

## 2019-10-31 PROCEDURE — G0008 ADMIN INFLUENZA VIRUS VAC: HCPCS | Performed by: INTERNAL MEDICINE

## 2019-10-31 NOTE — PROGRESS NOTES
INTERNAL MEDICINE FOLLOW-UP OFFICE VISIT  [facility]    NAME: Nata Mansfield  AGE: [de-identified] y o  SEX: male  : 1940     DATE: 10/31/2019     Assessment and Plan:     1  Essential hypertension    Blood pressure has been well controlled  Continue current antihypertensives  He still remains pretty active for his age  - Basic metabolic panel; Future    2  Mixed hyperlipidemia    Cholesterol is been controlled  Continue statin as prescribed  - Lipid panel; Future    3  Impaired fasting glucose    Patient's last hemoglobin A1c was 6 1% in 2019  Avoid excess carbohydrates and starches  Will check up-to-date hemoglobin A1c     - Hemoglobin A1C; Future    4  Atherosclerosis of native coronary artery of native heart without angina pectoris    Patient has been stable without angina  Continue current cardiac medications as prescribed  Return in about 6 months (around 2020) for Follow-up  Chief Complaint:     Chief Complaint   Patient presents with    Medicare Wellness Visit    Follow-up     4 month      History of Present Illness:     Patient presents for routine follow-up  He has underlying history of coronary artery disease, hypertension, hyperlipidemia, prediabetes, and COPD  He denies any recent changes to his health  He denies any recent falls, ER visits, or hospitalizations  No increased cough or sputum production  No chest pain, palpitations, orthopnea, paroxysmal nocturnal dyspnea  Last A1c was 6 1%  His last set of labs were in 2019  He has no new concerns or complaints  He gets general aches and pains for his age  Review of Systems:     Review of Systems   Constitutional: Negative for activity change, appetite change and fatigue  Respiratory: Negative for apnea, cough, chest tightness, shortness of breath and wheezing  Cardiovascular: Negative for chest pain, palpitations and leg swelling  Gastrointestinal: Negative for abdominal distention, abdominal pain, blood in stool, constipation, diarrhea, nausea and vomiting  Musculoskeletal: Positive for arthralgias and back pain  Negative for gait problem, joint swelling and myalgias  Skin: Negative for rash and wound  Neurological: Negative for dizziness, weakness, light-headedness, numbness and headaches  Psychiatric/Behavioral: Negative for behavioral problems, confusion, hallucinations, sleep disturbance and suicidal ideas  The patient is not nervous/anxious  Problem List:     Patient Active Problem List   Diagnosis    Hyperlipidemia    Chronic nonintractable headache    Pulmonary emphysema (HCC)    Benign prostatic hyperplasia without lower urinary tract symptoms    Carotid artery stenosis    Hx of CABG    Atherosclerosis of coronary artery    Essential hypertension    Cataract    Cervical radiculopathy    Chronic sphenoidal sinusitis    Esophageal reflux    Foraminal stenosis of thoracic region    Impaired fasting glucose    Osteoarthritis of hip      Objective:     /72 (BP Location: Left arm, Patient Position: Sitting, Cuff Size: Standard)   Pulse 72   Wt 73 9 kg (163 lb) Comment: w/ shoes denied off  SpO2 97%   BMI 25 34 kg/m²     Physical Exam   Constitutional: He is oriented to person, place, and time  He appears well-developed and well-nourished  No distress  Eyes: Conjunctivae are normal  Right eye exhibits no discharge  Left eye exhibits no discharge  No scleral icterus  Neck: Neck supple  No JVD present  No thyromegaly present  Cardiovascular: Normal rate, regular rhythm and normal heart sounds  No murmur heard  Pulmonary/Chest: Effort normal and breath sounds normal  No respiratory distress  He has no wheezes  He has no rales  He exhibits no tenderness  Abdominal: Soft  Bowel sounds are normal  He exhibits no distension and no mass  There is no tenderness  There is no rebound and no guarding  No hernia  Musculoskeletal: He exhibits no edema  Lymphadenopathy:     He has no cervical adenopathy  Neurological: He is alert and oriented to person, place, and time  Skin: Skin is warm and dry  He is not diaphoretic  Psychiatric: He has a normal mood and affect  His behavior is normal    Vitals reviewed      Jelani Traylor DO  [facility]

## 2019-10-31 NOTE — PROGRESS NOTES
Assessment and Plan:     1  Medicare annual wellness visit, subsequent    2  Need for influenza vaccination  - influenza vaccine, [id number], high-dose, PF 0 5 mL (FLUZONE HIGH-DOSE)     BMI Counseling: Body mass index is 25 34 kg/m²  The BMI is above normal  Nutrition recommendations include decreasing portion sizes, encouraging healthy choices of fruits and vegetables, limiting drinks that contain sugar, moderation in carbohydrate intake and increasing intake of lean protein  Low aerobic physical activity recommended 5 times per week  Preventive health issues were discussed with patient, and age appropriate screening tests were ordered as noted in patient's After Visit Summary  Personalized health advice and appropriate referrals for health education or preventive services given if needed, as noted in patient's After Visit Summary       History of Present Illness:     Patient presents for Medicare Annual Wellness visit    Patient Care Team:  Gabrielle Padilla DO as PCP - General (Internal Medicine)  MD Tiera Nagel MD     Problem List:     Patient Active Problem List   Diagnosis    Hyperlipidemia    Chronic nonintractable headache    Pulmonary emphysema ([address] )    Benign prostatic hyperplasia without lower urinary tract symptoms    Carotid artery stenosis    Hx of CABG    Atherosclerosis of coronary artery    Essential hypertension    Cataract    Cervical radiculopathy    Chronic sphenoidal sinusitis    Esophageal reflux    Foraminal stenosis of thoracic region    Impaired fasting glucose    Osteoarthritis of hip      Past Medical and Surgical History:     Past Medical History:   Diagnosis Date    CAD (coronary artery disease)     Cervicogenic headache     Hx of CABG     Hyperlipemia     Hypertension     Renal lesion      Past Surgical History:   Procedure Laterality Date    CAROTID ENDARTERECTOMY Right     CORONARY ARTERY BYPASS GRAFT  2011    HERNIA REPAIR      LITHOTRIPSY      TOTAL HIP ARTHROPLASTY        Family History:     Family History   Problem Relation Age of Onset    Nephrolithiasis Mother     Heart attack Father     Stroke Father       Social History:     Social History     Socioeconomic History    Marital status:      Spouse name: None    Number of children: 2    Years of education: None    Highest education level: None   Occupational History    None   Social Needs    Financial resource strain: None    Food insecurity:     Worry: None     Inability: None    Transportation needs:     Medical: None     Non-medical: None   Tobacco Use    Smoking status: Former Smoker     Packs/day: 1 00     Years: 60 00     Pack years: 60 00     Types: Cigarettes     Last attempt to quit:      Years since quittin 8    Smokeless tobacco: Never Used   Substance and Sexual Activity    Alcohol use: Yes     Alcohol/week: 1 0 standard drinks     Types: 1 Shots of liquor per week     Frequency: 2-3 times a week     Drinks per session: 1 or 2     Binge frequency: Never     Comment: social shot once a week    Drug use: No    Sexual activity: Yes     Partners: Female   Lifestyle    Physical activity:     Days per week: 4 days     Minutes per session: 150+ min    Stress:  Only a little   Relationships    Social connections:     Talks on phone: None     Gets together: None     Attends Zoroastrianism service: None     Active member of club or organization: None     Attends meetings of clubs or organizations: None     Relationship status: None    Intimate partner violence:     Fear of current or ex partner: None     Emotionally abused: None     Physically abused: None     Forced sexual activity: None   Other Topics Concern    None   Social History Narrative    Caffeine use       Medications and Allergies:     Current Outpatient Medications   Medication Sig Dispense Refill    Ascorbic Acid (VITAMIN C) 100 MG tablet Take 1 tablet by mouth daily      aspirin (ECOTRIN LOW STRENGTH) 81 mg EC tablet Take 2 tablets by mouth daily       azelastine (ASTELIN) 0 1 % nasal spray 1-2 sprays into each nostril 2 (two) times a day (Patient taking differently: 1-2 sprays into each nostril daily ) 30 mL 5    B Complex Vitamins (VITAMIN B COMPLEX PO) Take 1 tablet by mouth daily      fluticasone (FLONASE) 50 mcg/act nasal spray 2 sprays into each nostril daily (Patient taking differently: 2 sprays into each nostril as needed ) 16 g 5    meclizine (ANTIVERT) 25 mg tablet Take 25 mg by mouth as needed   0    metoprolol succinate (TOPROL-XL) 50 mg 24 hr tablet Take 1 tablet (50 mg total) by mouth daily 90 tablet 2    nitroglycerin (NITROSTAT) 0 4 mg SL tablet Place 0 4 mg under the tongue as needed       pantoprazole (PROTONIX) 40 mg tablet Take 1 tablet (40 mg total) by mouth daily 90 tablet 3    Pediatric Multivitamins-Fl (MULTIVITAMINS/FL PO) Take 1 capsule by mouth daily      simvastatin (ZOCOR) 40 mg tablet Take 1 tablet (40 mg total) by mouth daily at bedtime 90 tablet 2    tamsulosin (FLOMAX) 0 4 mg Take 1 capsule (0 4 mg total) by mouth daily 90 capsule 2     No current facility-administered medications for this visit  Allergies   Allergen Reactions    Prednisone Other (See Comments)     Constipation        Immunizations:     Immunization History   Administered Date(s) Administered    H1N1, All Formulations 12/31/2009    INFLUENZA 11/09/2006, 11/08/2007, 01/05/2009, 10/24/2016, 11/14/2017, 12/30/2018    Influenza Split High Dose Preservative Free IM 01/08/2015, 11/14/2017    Influenza TIV (IM) 10/21/2003, 11/09/2006, 11/08/2007, 01/05/2009, 10/21/2010, 10/24/2016    Pneumococcal Conjugate 13-Valent 10/25/2015, 03/07/2016    Pneumococcal Polysaccharide PPV23 10/21/2003, 04/30/2019    Tdap 04/24/2014      Health Maintenance: There are no preventive care reminders to display for this patient        Topic Date Due    INFLUENZA VACCINE  07/01/2019 Medicare Health Risk Assessment:     /72 (BP Location: Left arm, Patient Position: Sitting, Cuff Size: Standard)   Pulse 72   Wt 73 9 kg (163 lb) Comment: w/ shoes denied off  SpO2 97%   BMI 25 34 kg/m²      Nata is here for his Subsequent Wellness visit  Health Risk Assessment:   Patient rates overall health as fair  Patient feels that their physical health rating is same  Eyesight was rated as slightly worse  Hearing was rated as same  Patient feels that their emotional and mental health rating is slightly worse  Pain experienced in the last 7 days has been some  Patient's pain rating has been 4/10  Patient states that he has experienced no weight loss or gain in last 6 months  Depression Screening:   PHQ-2 Score: 0      Fall Risk Screening: In the past year, patient has experienced: no history of falling in past year      Home Safety:  Patient does not have trouble with stairs inside or outside of their home  Patient has no working smoke alarms and has working carbon monoxide detector  Home safety hazards include: none  Nutrition:   Current diet is Regular  Medications:   Patient is currently taking over-the-counter supplements  OTC medications include: see medication list  Patient is able to manage medications  Activities of Daily Living (ADLs)/Instrumental Activities of Daily Living (IADLs):   Walk and transfer into and out of bed and chair?: Yes  Dress and groom yourself?: Yes    Bathe or shower yourself?: Yes    Feed yourself? Yes  Do your laundry/housekeeping?: Yes  Manage your money, pay your bills and track your expenses?: Yes  Make your own meals?: Yes    Do your own shopping?: Yes    Durable Medical Equipment Suppliers  none    Previous Hospitalizations:   Any hospitalizations or ED visits within the last 12 months?: No      Advance Care Planning:   Living will: No    Durable POA for healthcare:  Yes    Advanced directive: No    Five wishes given: Yes      PREVENTIVE SCREENINGS      Cardiovascular Screening:    General: Screening Not Indicated and History Lipid Disorder      Diabetes Screening:     General: Screening Current      Colorectal Cancer Screening:     General: Screening Not Indicated      Prostate Cancer Screening:    General: Screening Not Indicated      Osteoporosis Screening:    General: Screening Not Indicated      Abdominal Aortic Aneurysm (AAA) Screening:    Risk factors include: tobacco use        General: Screening Not Indicated      Lung Cancer Screening:     General: Screening Not Indicated      Hepatitis C Screening:    General: Screening Not Indicated    Other Counseling Topics:   Car/seat belt/driving safety, skin self-exam, sunscreen and regular weightbearing exercise       Mateus Keane, DO

## 2019-12-20 ENCOUNTER — TELEPHONE (OUTPATIENT)
Dept: CARDIOLOGY CLINIC | Facility: CLINIC | Age: 79
End: 2019-12-20

## 2019-12-20 NOTE — TELEPHONE ENCOUNTER
Dr Rose Escort office   Pt scheduled  For sinus surgery and they need ekg and stress test results faxed over Fax [phone]

## 2020-01-06 ENCOUNTER — TELEPHONE (OUTPATIENT)
Dept: CARDIOLOGY CLINIC | Facility: CLINIC | Age: 80
End: 2020-01-06

## 2020-01-06 ENCOUNTER — OFFICE VISIT (OUTPATIENT)
Dept: CARDIOLOGY CLINIC | Facility: CLINIC | Age: 80
End: 2020-01-06
Payer: MEDICARE

## 2020-01-06 VITALS
HEART RATE: 72 BPM | OXYGEN SATURATION: 95 % | WEIGHT: 161 LBS | DIASTOLIC BLOOD PRESSURE: 60 MMHG | BODY MASS INDEX: 25.27 KG/M2 | SYSTOLIC BLOOD PRESSURE: 120 MMHG | HEIGHT: 67 IN

## 2020-01-06 DIAGNOSIS — I25.10 ATHEROSCLEROSIS OF NATIVE CORONARY ARTERY OF NATIVE HEART WITHOUT ANGINA PECTORIS: Primary | ICD-10-CM

## 2020-01-06 DIAGNOSIS — E78.00 HYPERCHOLESTEREMIA: ICD-10-CM

## 2020-01-06 DIAGNOSIS — I10 ESSENTIAL HYPERTENSION: ICD-10-CM

## 2020-01-06 DIAGNOSIS — Z01.810 PRE-OPERATIVE CARDIOVASCULAR EXAMINATION: ICD-10-CM

## 2020-01-06 PROCEDURE — 99213 OFFICE O/P EST LOW 20 MIN: CPT | Performed by: INTERNAL MEDICINE

## 2020-01-06 NOTE — PROGRESS NOTES
[facility] [address] [address]  Cardiology Follow Up    Hieu Lawrence  1940  [id number]      1  Atherosclerosis of native coronary artery of native heart without angina pectoris     2  Essential hypertension     3  Hypercholesteremia     4  Pre-operative cardiovascular examination         Chief Complaint   Patient presents with    Cardiac Risk Assessment     bilateral myringotomy with tubes w/ Dr Roberto Dolan MD [facility]) 1/14/2020       Interval History:  Patient presents for preoperative cardiovascular evaluation for ENT procedure  Patient denies any chest pain or shortness of breath  No history of leg edema orthopnea PND  No history of presyncope syncope  Patient states that he has been compliant with all his present medications  Patient Active Problem List   Diagnosis    Hyperlipidemia    Chronic nonintractable headache    Pulmonary emphysema (HCC)    Benign prostatic hyperplasia without lower urinary tract symptoms    Carotid artery stenosis    Hx of CABG    Atherosclerosis of coronary artery    Essential hypertension    Cataract    Cervical radiculopathy    Chronic sphenoidal sinusitis    Esophageal reflux    Foraminal stenosis of thoracic region    Impaired fasting glucose    Osteoarthritis of hip     Past Medical History:   Diagnosis Date    CAD (coronary artery disease)     Cervicogenic headache     Hx of CABG     Hyperlipemia     Hypertension     Renal lesion      Social History     Socioeconomic History    Marital status:       Spouse name: Not on file    Number of children: 2    Years of education: Not on file    Highest education level: Not on file   Occupational History    Not on file   Social Needs    Financial resource strain: Not on file    Food insecurity:     Worry: Not on file     Inability: Not on file    Transportation needs:     Medical: Not on file     Non-medical: Not on file Tobacco Use    Smoking status: Former Smoker     Packs/day: 1 00     Years: 60 00     Pack years: 60 00     Types: Cigarettes     Last attempt to quit: 2012     Years since quittin 0    Smokeless tobacco: Never Used   Substance and Sexual Activity    Alcohol use: Yes     Alcohol/week: 1 0 standard drinks     Types: 1 Shots of liquor per week     Frequency: 2-3 times a week     Drinks per session: 1 or 2     Binge frequency: Never     Comment: social shot once a week    Drug use: No    Sexual activity: Yes     Partners: Female   Lifestyle    Physical activity:     Days per week: 4 days     Minutes per session: 150+ min    Stress:  Only a little   Relationships    Social connections:     Talks on phone: Not on file     Gets together: Not on file     Attends [employer] service: Not on file     Active member of club or organization: Not on file     Attends meetings of clubs or organizations: Not on file     Relationship status: Not on file    Intimate partner violence:     Fear of current or ex partner: Not on file     Emotionally abused: Not on file     Physically abused: Not on file     Forced sexual activity: Not on file   Other Topics Concern    Not on file   Social History Narrative    Caffeine use      Family History   Problem Relation Age of Onset    Nephrolithiasis Mother     Heart attack Father     Stroke Father      Past Surgical History:   Procedure Laterality Date    CAROTID ENDARTERECTOMY Right     CORONARY ARTERY BYPASS GRAFT      HERNIA REPAIR      LITHOTRIPSY      TOTAL HIP ARTHROPLASTY         Current Outpatient Medications:     Ascorbic Acid (VITAMIN C) 100 MG tablet, Take 1 tablet by mouth daily, Disp: , Rfl:     aspirin (ECOTRIN LOW STRENGTH) 81 mg EC tablet, Take 2 tablets by mouth daily , Disp: , Rfl:     azelastine (ASTELIN) 0 1 % nasal spray, 1-2 sprays into each nostril 2 (two) times a day (Patient taking differently: 1-2 sprays into each nostril daily ), Disp: 30 mL, Rfl: 5    B Complex Vitamins (VITAMIN B COMPLEX PO), Take 1 tablet by mouth daily, Disp: , Rfl:     fluticasone (FLONASE) 50 mcg/act nasal spray, 2 sprays into each nostril daily (Patient taking differently: 2 sprays into each nostril as needed ), Disp: 16 g, Rfl: 5    meclizine (ANTIVERT) 25 mg tablet, Take 25 mg by mouth as needed , Disp: , Rfl: 0    metoprolol succinate (TOPROL-XL) 50 mg 24 hr tablet, Take 1 tablet (50 mg total) by mouth daily, Disp: 90 tablet, Rfl: 2    nitroglycerin (NITROSTAT) 0 4 mg SL tablet, Place 0 4 mg under the tongue as needed , Disp: , Rfl:     pantoprazole (PROTONIX) 40 mg tablet, Take 1 tablet (40 mg total) by mouth daily, Disp: 90 tablet, Rfl: 3    Pediatric Multivitamins-Fl (MULTIVITAMINS/FL PO), Take 1 capsule by mouth daily, Disp: , Rfl:     simvastatin (ZOCOR) 40 mg tablet, Take 1 tablet (40 mg total) by mouth daily at bedtime, Disp: 90 tablet, Rfl: 2    tamsulosin (FLOMAX) 0 4 mg, Take 1 capsule (0 4 mg total) by mouth daily, Disp: 90 capsule, Rfl: 2  Allergies   Allergen Reactions    Prednisone Other (See Comments)     Constipation         Labs:  No visits with results within 2 Month(s) from this visit  Latest known visit with results is:   Hospital Outpatient Visit on 10/07/2019   Component Date Value    Protocol Name 10/07/2019 [address] Time In Exercise Phase 10/07/2019 00:06:00     MAX  SYSTOLIC BP 51/45/0325 575     Max Diastolic Bp 03/49/9402 60     Max Heart Rate 10/07/2019 116     Max Predicted Heart Rate 10/07/2019 140     Reason for Termination 10/07/2019 Maximal exercise (symptom limited)     Test Indication 10/07/2019 CAD, SOB     Target Hr Formular 10/07/2019 (220 - Age)*85%     Arrhy During Ex 10/07/2019 none     Chest Pain Statement 10/07/2019 none      Imaging: No results found      Review of Systems:  Review of Systems   REVIEW OF SYSTEMS:  Constitutional:  Denies fever or chills   Eyes:  Denies change in visual acuity   HENT:  Denies nasal congestion or sore throat   Respiratory:  Denies cough or shortness of breath   Cardiovascular:  Denies chest pain or edema   GI:  Denies abdominal pain, nausea, vomiting, bloody stools or diarrhea   :  Denies dysuria, frequency, difficulty in micturition and nocturia  Musculoskeletal:  Denies back pain or joint pain   Neurologic:  Denies headache, focal weakness or sensory changes   Endocrine:  Denies polyuria or polydipsia   Lymphatic:  Denies swollen glands   Psychiatric:  Denies depression or anxiety     Physical Exam:    /60   Pulse 72   Ht 5' 7 25" (1 708 m)   Wt 73 kg (161 lb)   SpO2 95%   BMI 25 03 kg/m²     Physical Exam   PHYSICAL EXAM:  General:  Patient is not in acute distress   Head: Normocephalic, Atraumatic  HEENT:  Both pupils normal-size atraumatic, normocephalic, nonicteric  Neck:  JVP not raised  Trachea central  No carotid bruit  Respiratory:  normal breath sounds no crackles  no rhonchi  Cardiovascular:  Regular rate and rhythm no S3 no murmurs  GI:  Abdomen soft nontender  No organomegaly  Lymphatic:  No cervical or inguinal lymphadenopathy  Neurologic:  Patient is awake alert, oriented   Grossly nonfocal  Extremities no edema    Discussion/Summary:  Patient with multiple medical problems who seems to be doing reasonably well from cardiac standpoint  Previous studies reviewed with patient  Medications reviewed and possible side effects discussed  concepts of cardiovascular disease , signs and symptoms of heart disease  Dietary and risk factor modification reinforced  All questions answered  Safety measures reviewed  Patient advised to report any problems prompting medical attention  Patient has no specific contraindication from cardiac standpoint to proceed with the planned ENT surgery  Patient is low to moderate risk for the planned procedure  Patient can stop aspirin 5 to 7 days prior to procedure    Patient did have a nuclear stress test less than a year ago which was negative for ischemia with normal ejection fraction  Preoperative evaluation letter faxed to Dr Lyons's office  Follow-up in 6 months or earlier as needed

## 2020-01-06 NOTE — TELEPHONE ENCOUNTER
----- Message from Angelia Cevallos MD sent at 1/6/2020  9:50 AM EST -----  Regarding: Preop letter  Please let Dr Lyons's office know that I faxed a letter for preop evaluation dated today in the chart

## 2020-03-03 ENCOUNTER — OFFICE VISIT (OUTPATIENT)
Dept: INTERNAL MEDICINE CLINIC | Facility: CLINIC | Age: 80
End: 2020-03-03
Payer: MEDICARE

## 2020-03-03 VITALS
OXYGEN SATURATION: 94 % | HEART RATE: 80 BPM | SYSTOLIC BLOOD PRESSURE: 116 MMHG | HEIGHT: 67 IN | DIASTOLIC BLOOD PRESSURE: 80 MMHG | BODY MASS INDEX: 24.8 KG/M2 | WEIGHT: 158 LBS

## 2020-03-03 DIAGNOSIS — S39.012D STRAIN OF LUMBAR REGION, SUBSEQUENT ENCOUNTER: ICD-10-CM

## 2020-03-03 DIAGNOSIS — I71.4 ABDOMINAL AORTIC ANEURYSM WITHOUT RUPTURE (HCC): Primary | Chronic | ICD-10-CM

## 2020-03-03 DIAGNOSIS — N40.0 BENIGN PROSTATIC HYPERPLASIA WITHOUT LOWER URINARY TRACT SYMPTOMS: ICD-10-CM

## 2020-03-03 DIAGNOSIS — E78.00 HYPERCHOLESTEROLEMIA: ICD-10-CM

## 2020-03-03 PROBLEM — I71.40 ABDOMINAL AORTIC ANEURYSM WITHOUT RUPTURE: Chronic | Status: ACTIVE | Noted: 2020-03-03

## 2020-03-03 PROCEDURE — 1036F TOBACCO NON-USER: CPT | Performed by: INTERNAL MEDICINE

## 2020-03-03 PROCEDURE — 1160F RVW MEDS BY RX/DR IN RCRD: CPT | Performed by: INTERNAL MEDICINE

## 2020-03-03 PROCEDURE — 99213 OFFICE O/P EST LOW 20 MIN: CPT | Performed by: INTERNAL MEDICINE

## 2020-03-03 PROCEDURE — 4040F PNEUMOC VAC/ADMIN/RCVD: CPT | Performed by: INTERNAL MEDICINE

## 2020-03-03 PROCEDURE — 3074F SYST BP LT 130 MM HG: CPT | Performed by: INTERNAL MEDICINE

## 2020-03-03 PROCEDURE — 3008F BODY MASS INDEX DOCD: CPT | Performed by: INTERNAL MEDICINE

## 2020-03-03 PROCEDURE — 3079F DIAST BP 80-89 MM HG: CPT | Performed by: INTERNAL MEDICINE

## 2020-03-03 RX ORDER — TAMSULOSIN HYDROCHLORIDE 0.4 MG/1
0.4 CAPSULE ORAL DAILY
Qty: 90 CAPSULE | Refills: 3 | Status: SHIPPED | OUTPATIENT
Start: 2020-03-03 | End: 2021-03-02 | Stop reason: SDUPTHER

## 2020-03-03 RX ORDER — SIMVASTATIN 40 MG
40 TABLET ORAL
Qty: 90 TABLET | Refills: 3 | Status: SHIPPED | OUTPATIENT
Start: 2020-03-03 | End: 2021-11-19 | Stop reason: SDUPTHER

## 2020-03-03 NOTE — PATIENT INSTRUCTIONS
Acute Low Back Pain, Ambulatory Care   GENERAL INFORMATION:   Acute low back pain  is discomfort in your lower back area that lasts for less than 12 weeks  The word acute is used to describe pain that starts suddenly, worsens quickly, and lasts for a short time  Common symptoms include the following:   · Back stiffness or spasms    · Pain down the back or side of one leg    · Holding yourself in an unusual position or posture to decrease your back pain    · Not being able to find a sitting position that is comfortable    · Slow increase in your pain for 24 to 48 hours after you stress your back    · Tenderness on your lower back or severe pain when you move your back  Seek immediate care for the following symptoms:   · Severe pain    · Sudden stiffness and heaviness in both buttocks down to both legs    · Numbness or weakness in one leg, or pain in both legs    · Numbness in your genital area or across your lower back    · Unable to control your urine or bowel movements  Treatment for acute low back pain  may include any of the following:  · Medicines:      ¨ NSAIDs  help decrease swelling and pain or fever  This medicine is available with or without a doctor's order  NSAIDs can cause stomach bleeding or kidney problems in certain people  If you take blood thinner medicine, always ask your healthcare provider if NSAIDs are safe for you  Always read the medicine label and follow directions  ¨ Muscle relaxers  help decrease muscle spasms pain  ¨ Prescription pain medicine  may be given  Ask how to take this medicine safely  · Surgery  may be needed if your pain is severe and other treatments do not work  Surgery may be needed for conditions of the lumbar spine, such as herniated disc or spinal stenosis  Manage your symptoms:   · Sleep on a firm mattress  If you do not have a firm mattress, have someone move your mattress to the floor for a few days   A piece of plywood under your mattress can also help make it firmer  · Apply ice  on your lower back for 15 to 20 minutes every hour or as directed  Use an ice pack, or put crushed ice in a plastic bag  Cover it with a towel  Ice helps prevent tissue damage and decreases swelling and pain  You can alternate ice and heat  · Apply heat  on your lower back for 20 to 30 minutes every 2 hours for as many days as directed  Heat helps decrease pain and muscle spasms  · Go to physical therapy  A physical therapist teaches you exercises to help improve movement and strength, and to decrease pain  Prevent acute low back pain:   · Use proper body mechanics  ¨ Bend at the hips and knees when you  objects  Do not bend from the waist  Use your leg muscles as you lift the load  Do not use your back  Keep the object close to your chest as you lift it  Try not to twist or lift anything above your waist     ¨ Change your position often when you stand for long periods of time  Rest one foot on a small box or footrest, and then switch to the other foot often  ¨ Try not to sit for long periods of time  When you do, sit in a straight-backed chair with your feet flat on the floor  Never reach, pull, or push while you are sitting  · Exercise regularly  Warm up before you exercise  Do exercises that strengthen your back muscles  Ask about the best exercise plan for you  · Maintain a healthy weight  Ask your healthcare provider how much you should weigh  Ask him to help you create a weight loss plan if you are overweight  Follow up with your healthcare provider as directed:  Return for a follow-up visit if you still have pain after 1 to 3 weeks of treatment  You may need to visit an orthopedist if your back pain lasts more than 6 to 12 weeks  Write down your questions so you remember to ask them during your visits  CARE AGREEMENT:   You have the right to help plan your care  Learn about your health condition and how it may be treated   Discuss treatment options with your caregivers to decide what care you want to receive  You always have the right to refuse treatment  The above information is an  only  It is not intended as medical advice for individual conditions or treatments  Talk to your doctor, nurse or pharmacist before following any medical regimen to see if it is safe and effective for you  © 2014 [address] is for End User's use only and may not be sold, redistributed or otherwise used for commercial purposes  All illustrations and images included in CareNotes® are the copyrighted property of A D A M , Inc  or [employer]

## 2020-03-03 NOTE — PROGRESS NOTES
INTERNAL MEDICINE FOLLOW-UP OFFICE VISIT  [facility]    NAME: Nata Mansfield  AGE: 79 y o  SEX: male  : 1940     DATE: 3/3/2020     Assessment and Plan:     1  Abdominal aortic aneurysm without rupture (HCC)     3 cm infrarenal AAA noted incidentally  Will monitor at this time  BP is controlled  He is a former smoker  2  Strain of lumbar region, subsequent encounter    Discussed lower back exercises and avoiding strain to the back  His back pain is resolved  3  Benign prostatic hyperplasia without lower urinary tract symptoms  - tamsulosin (FLOMAX) 0 4 mg; Take 1 capsule (0 4 mg total) by mouth daily  Dispense: 90 capsule; Refill: 3    4  Hypercholesterolemia  - simvastatin (ZOCOR) 40 mg tablet; Take 1 tablet (40 mg total) by mouth daily at bedtime  Dispense: 90 tablet; Refill: 3    Return in about 8 weeks (around 2020)  Chief Complaint:     Chief Complaint   Patient presents with    [facility]      History of Present Illness:     Patient was lifting heavy boxes and then subsequently started to develop back pain with some radiation down his right leg  Went to [facility] on 2020 and had CT lumbar spine which did not show any significant disc herniation or spinal stenosis  No evidence of fracture  Incidentally noted 3 cm infrarenal AAA  Back pain improved with rest, heat, NSAIDs  Review of Systems:     Review of Systems   Respiratory: Negative  Cardiovascular: Negative  Musculoskeletal: Negative for arthralgias, back pain and gait problem  Objective:     /80 (BP Location: Left arm, Patient Position: Sitting, Cuff Size: Standard)   Pulse 80   Ht 5' 6 5" (1 689 m)   Wt 71 7 kg (158 lb)   SpO2 94%   BMI 25 12 kg/m²     Physical Exam   Constitutional: He appears well-developed and well-nourished  No distress  Cardiovascular: Normal rate and regular rhythm     Pulmonary/Chest: Effort normal and breath sounds normal    Abdominal: Soft  Bowel sounds are normal    Skin: He is not diaphoretic         Mateus Keane DO  [facility]

## 2020-06-22 ENCOUNTER — TELEPHONE (OUTPATIENT)
Dept: INTERNAL MEDICINE CLINIC | Facility: CLINIC | Age: 80
End: 2020-06-22

## 2020-07-13 ENCOUNTER — OFFICE VISIT (OUTPATIENT)
Dept: INTERNAL MEDICINE CLINIC | Facility: CLINIC | Age: 80
End: 2020-07-13
Payer: MEDICARE

## 2020-07-13 VITALS
BODY MASS INDEX: 25.71 KG/M2 | HEIGHT: 67 IN | SYSTOLIC BLOOD PRESSURE: 138 MMHG | OXYGEN SATURATION: 96 % | DIASTOLIC BLOOD PRESSURE: 62 MMHG | WEIGHT: 163.8 LBS | TEMPERATURE: 96.8 F | HEART RATE: 74 BPM

## 2020-07-13 DIAGNOSIS — N40.0 BENIGN PROSTATIC HYPERPLASIA WITHOUT LOWER URINARY TRACT SYMPTOMS: Chronic | ICD-10-CM

## 2020-07-13 DIAGNOSIS — I71.4 ABDOMINAL AORTIC ANEURYSM WITHOUT RUPTURE (HCC): ICD-10-CM

## 2020-07-13 DIAGNOSIS — J45.20 MILD INTERMITTENT ASTHMA WITHOUT COMPLICATION: Primary | ICD-10-CM

## 2020-07-13 DIAGNOSIS — I10 ESSENTIAL HYPERTENSION: ICD-10-CM

## 2020-07-13 DIAGNOSIS — K21.9 GASTROESOPHAGEAL REFLUX DISEASE WITHOUT ESOPHAGITIS: ICD-10-CM

## 2020-07-13 DIAGNOSIS — J43.1 PANLOBULAR EMPHYSEMA (HCC): ICD-10-CM

## 2020-07-13 DIAGNOSIS — E78.2 MIXED HYPERLIPIDEMIA: ICD-10-CM

## 2020-07-13 PROCEDURE — 1160F RVW MEDS BY RX/DR IN RCRD: CPT | Performed by: PHYSICIAN ASSISTANT

## 2020-07-13 PROCEDURE — 3078F DIAST BP <80 MM HG: CPT | Performed by: PHYSICIAN ASSISTANT

## 2020-07-13 PROCEDURE — 4040F PNEUMOC VAC/ADMIN/RCVD: CPT | Performed by: PHYSICIAN ASSISTANT

## 2020-07-13 PROCEDURE — 99214 OFFICE O/P EST MOD 30 MIN: CPT | Performed by: PHYSICIAN ASSISTANT

## 2020-07-13 PROCEDURE — 1036F TOBACCO NON-USER: CPT | Performed by: PHYSICIAN ASSISTANT

## 2020-07-13 PROCEDURE — 3008F BODY MASS INDEX DOCD: CPT | Performed by: PHYSICIAN ASSISTANT

## 2020-07-13 PROCEDURE — 3075F SYST BP GE 130 - 139MM HG: CPT | Performed by: PHYSICIAN ASSISTANT

## 2020-07-13 RX ORDER — ALBUTEROL SULFATE 90 UG/1
2 AEROSOL, METERED RESPIRATORY (INHALATION) 2 TIMES DAILY PRN
Qty: 1 INHALER | Refills: 3 | Status: SHIPPED | OUTPATIENT
Start: 2020-07-13 | End: 2022-02-01 | Stop reason: SDUPTHER

## 2020-07-13 RX ORDER — PANTOPRAZOLE SODIUM 40 MG/1
40 TABLET, DELAYED RELEASE ORAL DAILY
Qty: 90 TABLET | Refills: 3 | Status: SHIPPED | OUTPATIENT
Start: 2020-07-13 | End: 2021-07-16 | Stop reason: SDUPTHER

## 2020-07-13 RX ORDER — ALBUTEROL SULFATE 90 UG/1
2 AEROSOL, METERED RESPIRATORY (INHALATION) 2 TIMES DAILY PRN
Qty: 1 INHALER | Refills: 3 | Status: SHIPPED | OUTPATIENT
Start: 2020-07-13 | End: 2020-07-13 | Stop reason: SDUPTHER

## 2020-07-13 RX ORDER — PANTOPRAZOLE SODIUM 40 MG/1
40 TABLET, DELAYED RELEASE ORAL DAILY
Qty: 90 TABLET | Refills: 3 | Status: SHIPPED | OUTPATIENT
Start: 2020-07-13 | End: 2020-07-13 | Stop reason: SDUPTHER

## 2020-07-13 RX ORDER — ALBUTEROL SULFATE 90 UG/1
2 AEROSOL, METERED RESPIRATORY (INHALATION) EVERY 6 HOURS PRN
COMMUNITY
Start: 2020-05-29 | End: 2020-07-13 | Stop reason: SDUPTHER

## 2020-07-13 NOTE — PROGRESS NOTES
Assessment/Plan:  His meds were reordered I referred him to Urology urgency frequency are no longer helped by Flomax  Overall he is feeling very well physical exam is unremarkable       Diagnoses and all orders for this visit:    Mild intermittent asthma without complication  -     Discontinue: albuterol (PROVENTIL HFA,VENTOLIN HFA) 90 mcg/act inhaler; Inhale 2 puffs 2 (two) times a day as needed for wheezing  -     albuterol (PROVENTIL HFA,VENTOLIN HFA) 90 mcg/act inhaler; Inhale 2 puffs 2 (two) times a day as needed for wheezing    Gastroesophageal reflux disease without esophagitis  -     Discontinue: pantoprazole (PROTONIX) 40 mg tablet; Take 1 tablet (40 mg total) by mouth daily  -     pantoprazole (PROTONIX) 40 mg tablet; Take 1 tablet (40 mg total) by mouth daily    Benign prostatic hyperplasia without lower urinary tract symptoms  -     Ambulatory referral to Urology; Future    Abdominal aortic aneurysm without rupture (HCC)    Essential hypertension    Mixed hyperlipidemia    Panlobular emphysema ([address] )    Other orders  -     Discontinue: albuterol (PROVENTIL HFA,VENTOLIN HFA) 90 mcg/act inhaler; Inhale 2 puffs every 6 (six) hours as needed        No problem-specific Assessment & Plan notes found for this encounter  Subjective:      Patient ID: Cesar Armstrong is a 79 y o  male  He is here for follow-up  He has nocturia x4 urgency gradually getting worse over the past year Flomax is no longer working  No blood in the urine no burning with urination no fever chills he is feeling very well overall he has known significant COPD followed by Pulmonary  It does not interfere with his activity very much  History of hypertension hyperlipidemia well controlled meds    Previous coronary bypass surgery no angina symptoms      The following portions of the patient's history were reviewed and updated as appropriate:   He has a past medical history of CAD (coronary artery disease), Cervicogenic headache, CABG, Hyperlipemia, Hypertension, and Renal lesion  ,  does not have any pertinent problems on file  ,   has a past surgical history that includes Coronary artery bypass graft (2011); Hernia repair; Total hip arthroplasty; Lithotripsy; and Carotid endarterectomy (Right)  ,  family history includes Heart attack in his father; Nephrolithiasis in his mother; Stroke in his father  ,   reports that he quit smoking about 8 years ago  His smoking use included cigarettes  He has a 60 00 pack-year smoking history  He has never used smokeless tobacco  He reports that he drinks about 1 0 standard drinks of alcohol per week  He reports that he does not use drugs  ,  is allergic to prednisone     Current Outpatient Medications   Medication Sig Dispense Refill    albuterol (PROVENTIL HFA,VENTOLIN HFA) 90 mcg/act inhaler Inhale 2 puffs 2 (two) times a day as needed for wheezing 1 Inhaler 3    Ascorbic Acid (VITAMIN C) 100 MG tablet Take 1 tablet by mouth daily      aspirin (ECOTRIN LOW STRENGTH) 81 mg EC tablet Take 2 tablets by mouth daily       azelastine (ASTELIN) 0 1 % nasal spray 1-2 sprays into each nostril 2 (two) times a day (Patient taking differently: 1-2 sprays into each nostril daily ) 30 mL 5    B Complex Vitamins (VITAMIN B COMPLEX PO) Take 1 tablet by mouth daily      fluticasone (FLONASE) 50 mcg/act nasal spray 2 sprays into each nostril daily (Patient taking differently: 2 sprays into each nostril as needed ) 16 g 5    meclizine (ANTIVERT) 25 mg tablet Take 25 mg by mouth as needed   0    metoprolol succinate (TOPROL-XL) 50 mg 24 hr tablet Take 1 tablet (50 mg total) by mouth daily 90 tablet 2    nitroglycerin (NITROSTAT) 0 4 mg SL tablet Place 0 4 mg under the tongue as needed       Pediatric Multivitamins-Fl (MULTIVITAMINS/FL PO) Take 1 capsule by mouth daily      simvastatin (ZOCOR) 40 mg tablet Take 1 tablet (40 mg total) by mouth daily at bedtime 90 tablet 3    tamsulosin (FLOMAX) 0 4 mg Take 1 capsule (0 4 mg total) by mouth daily 90 capsule 3    pantoprazole (PROTONIX) 40 mg tablet Take 1 tablet (40 mg total) by mouth daily 90 tablet 3     No current facility-administered medications for this visit  Review of Systems   Constitutional: Negative for activity change, appetite change, chills, diaphoresis, fatigue, fever and unexpected weight change  HENT: Negative for congestion, dental problem, drooling, ear discharge, ear pain, facial swelling, hearing loss, nosebleeds, postnasal drip, rhinorrhea, sinus pressure, sinus pain, sneezing, sore throat, tinnitus, trouble swallowing and voice change  Eyes: Negative for photophobia, pain, discharge, redness, itching and visual disturbance  Respiratory: Negative for apnea, cough, choking, chest tightness, shortness of breath, wheezing and stridor  Cardiovascular: Negative for chest pain, palpitations and leg swelling  Gastrointestinal: Negative for abdominal distention, abdominal pain, anal bleeding, blood in stool, constipation, diarrhea, nausea, rectal pain and vomiting  Endocrine: Negative for cold intolerance, heat intolerance, polydipsia, polyphagia and polyuria  Genitourinary: Positive for frequency and urgency  Negative for decreased urine volume, difficulty urinating, dysuria, enuresis, flank pain, genital sores and hematuria  Musculoskeletal: Negative for arthralgias, back pain, gait problem, joint swelling, myalgias, neck pain and neck stiffness  Skin: Negative for color change, pallor, rash and wound  Neurological: Negative for dizziness, tremors, seizures, syncope, facial asymmetry, speech difficulty, weakness, light-headedness, numbness and headaches  Hematological: Negative for adenopathy  Does not bruise/bleed easily  Psychiatric/Behavioral: Negative for agitation, behavioral problems, confusion, decreased concentration, dysphoric mood, hallucinations, self-injury, sleep disturbance and suicidal ideas   The patient is not nervous/anxious and is not hyperactive  Objective:  Vitals:    07/13/20 1324   BP: 138/62   BP Location: Left arm   Patient Position: Sitting   Cuff Size: Standard   Pulse: 74   Temp: (!) 96 8 °F (36 °C)   SpO2: 96%   Weight: 74 3 kg (163 lb 12 8 oz)   Height: 5' 6 5" (1 689 m)     Body mass index is 26 04 kg/m²  Physical Exam   Constitutional: He is oriented to person, place, and time  He appears well-developed  HENT:   Head: Normocephalic  Right Ear: External ear normal    Left Ear: External ear normal    Mouth/Throat: Oropharynx is clear and moist    Eyes: Pupils are equal, round, and reactive to light  Conjunctivae are normal    Neck: Neck supple  No JVD present  No thyromegaly present  Cardiovascular: Normal rate, regular rhythm and intact distal pulses  Murmur heard  Pulmonary/Chest: Effort normal and breath sounds normal  No respiratory distress  Overall slightly decreased breath sounds   Abdominal: Soft  Bowel sounds are normal  He exhibits no distension  There is no tenderness  There is no guarding  Musculoskeletal: Normal range of motion  He exhibits no edema  Lymphadenopathy:     He has no cervical adenopathy  Neurological: He is alert and oriented to person, place, and time  He has normal reflexes  He displays normal reflexes  No cranial nerve deficit or sensory deficit  He exhibits normal muscle tone  Coordination normal    Skin: Skin is warm and dry  Psychiatric: He has a normal mood and affect  His behavior is normal  Judgment and thought content normal    Vitals reviewed

## 2020-07-28 ENCOUNTER — TELEPHONE (OUTPATIENT)
Dept: INTERNAL MEDICINE CLINIC | Facility: CLINIC | Age: 80
End: 2020-07-28

## 2020-07-28 NOTE — LETTER
July 28, 2020     Patient: Nata Mansfield   YOB: 1940     To Whom it May Concern: Chris Doyle is under my professional care  He is at high risk for COVID-19  He was advised on 6/22/2020 to remain out of work at this time due to COVID-19 pandemic  At this time, it is my recommendation for him to return to work on 8/22/2020  If you have any questions or concerns, please don't hesitate to call           Sincerely,          Roderick Hamilton, DO

## 2020-07-28 NOTE — TELEPHONE ENCOUNTER
A LETTER WAS WRITTEN FOR PATIENT ON 6/22 TO BE OUT OF WORK DUE TO COVID    PATIENT WOULD LIKE TO KNOW WHEN TO RETURN AND NEEDS A NOTE     CALL PATIENT # [phone]

## 2020-09-21 ENCOUNTER — TELEPHONE (OUTPATIENT)
Dept: INTERNAL MEDICINE CLINIC | Facility: CLINIC | Age: 80
End: 2020-09-21

## 2020-09-21 NOTE — TELEPHONE ENCOUNTER
Patient was DX'ed with abdominal aortic aneurysm noted in Dr Colbert's note from 3/3/20   "found incidentally" and patient wants a copy of that test result  He provided a med rel form

## 2020-09-21 NOTE — TELEPHONE ENCOUNTER
Patient informed that he has to contact [address] [place] [address] as the test was done there  CT Lumbar incidentally revealed Abd Aortic Aneurysm done 2/19/20

## 2020-09-21 NOTE — TELEPHONE ENCOUNTER
Forwarding back to Phillip Horvath, patient has to call [facility] [place] [place] to get the report, we are unable to print from care everywhere

## 2020-09-28 ENCOUNTER — APPOINTMENT (OUTPATIENT)
Dept: VASCULAR SURGERY | Facility: CLINIC | Age: 80
End: 2020-09-28
Payer: MEDICARE

## 2020-09-28 PROCEDURE — 93880 EXTRACRANIAL BILAT STUDY: CPT

## 2020-09-28 PROCEDURE — 93978 VASCULAR STUDY: CPT

## 2020-09-28 PROCEDURE — 99214 OFFICE O/P EST MOD 30 MIN: CPT

## 2020-09-29 NOTE — HISTORY OF PRESENT ILLNESS
[FreeTextEntry1] : pt here for follow up after carotid surgery\par no issues\par some arthitic pain in the joints\par no neuro issues\par \par had a CT scan which showed incidental finding of thoracic and abdominal small aneurysms\par

## 2020-10-06 ENCOUNTER — OFFICE VISIT (OUTPATIENT)
Dept: CARDIOLOGY CLINIC | Facility: CLINIC | Age: 80
End: 2020-10-06
Payer: MEDICARE

## 2020-10-06 VITALS
DIASTOLIC BLOOD PRESSURE: 64 MMHG | HEART RATE: 88 BPM | BODY MASS INDEX: 26.84 KG/M2 | WEIGHT: 171 LBS | SYSTOLIC BLOOD PRESSURE: 128 MMHG | HEIGHT: 67 IN

## 2020-10-06 DIAGNOSIS — E78.00 HYPERCHOLESTEREMIA: ICD-10-CM

## 2020-10-06 DIAGNOSIS — I10 ESSENTIAL HYPERTENSION: ICD-10-CM

## 2020-10-06 DIAGNOSIS — I25.10 ATHEROSCLEROSIS OF NATIVE CORONARY ARTERY OF NATIVE HEART WITHOUT ANGINA PECTORIS: Primary | ICD-10-CM

## 2020-10-06 DIAGNOSIS — R07.89 CHEST DISCOMFORT: ICD-10-CM

## 2020-10-06 DIAGNOSIS — R06.02 SHORTNESS OF BREATH: ICD-10-CM

## 2020-10-06 PROCEDURE — 99214 OFFICE O/P EST MOD 30 MIN: CPT | Performed by: INTERNAL MEDICINE

## 2020-10-13 ENCOUNTER — HOSPITAL ENCOUNTER (OUTPATIENT)
Dept: NON INVASIVE DIAGNOSTICS | Facility: CLINIC | Age: 80
Discharge: HOME | End: 2020-10-13
Payer: MEDICARE

## 2020-10-13 ENCOUNTER — TELEPHONE (OUTPATIENT)
Dept: CARDIOLOGY CLINIC | Facility: CLINIC | Age: 80
End: 2020-10-13

## 2020-10-13 DIAGNOSIS — I25.10 ATHEROSCLEROSIS OF NATIVE CORONARY ARTERY OF NATIVE HEART WITHOUT ANGINA PECTORIS: ICD-10-CM

## 2020-10-13 DIAGNOSIS — R07.89 CHEST DISCOMFORT: ICD-10-CM

## 2020-10-13 LAB
MAX DIASTOLIC BP: 42 MMHG
MAX HEART RATE: 122 BPM
MAX PREDICTED HEART RATE: 139 BPM
MAX. SYSTOLIC BP: 138 MMHG
PROTOCOL NAME: NORMAL
REASON FOR TERMINATION: NORMAL
TARGET HR FORMULA: NORMAL
TIME IN EXERCISE PHASE: NORMAL

## 2020-10-13 PROCEDURE — 78452 HT MUSCLE IMAGE SPECT MULT: CPT

## 2020-10-13 PROCEDURE — 93018 CV STRESS TEST I&R ONLY: CPT | Performed by: INTERNAL MEDICINE

## 2020-10-13 PROCEDURE — 78452 HT MUSCLE IMAGE SPECT MULT: CPT | Performed by: INTERNAL MEDICINE

## 2020-10-13 PROCEDURE — 93017 CV STRESS TEST TRACING ONLY: CPT

## 2020-10-13 PROCEDURE — G1004 CDSM NDSC: HCPCS

## 2020-10-13 PROCEDURE — 93016 CV STRESS TEST SUPVJ ONLY: CPT | Performed by: INTERNAL MEDICINE

## 2020-10-13 PROCEDURE — A9502 TC99M TETROFOSMIN: HCPCS

## 2020-10-19 ENCOUNTER — OFFICE VISIT (OUTPATIENT)
Dept: INTERNAL MEDICINE CLINIC | Facility: CLINIC | Age: 80
End: 2020-10-19
Payer: MEDICARE

## 2020-10-19 VITALS
HEIGHT: 67 IN | BODY MASS INDEX: 25.68 KG/M2 | OXYGEN SATURATION: 96 % | TEMPERATURE: 97.8 F | DIASTOLIC BLOOD PRESSURE: 58 MMHG | SYSTOLIC BLOOD PRESSURE: 114 MMHG | WEIGHT: 163.6 LBS | HEART RATE: 74 BPM

## 2020-10-19 DIAGNOSIS — Z95.1 HX OF CABG: Chronic | ICD-10-CM

## 2020-10-19 DIAGNOSIS — E78.2 MIXED HYPERLIPIDEMIA: Chronic | ICD-10-CM

## 2020-10-19 DIAGNOSIS — N40.0 BENIGN PROSTATIC HYPERPLASIA WITHOUT LOWER URINARY TRACT SYMPTOMS: Chronic | ICD-10-CM

## 2020-10-19 DIAGNOSIS — I71.4 ABDOMINAL AORTIC ANEURYSM WITHOUT RUPTURE (HCC): Chronic | ICD-10-CM

## 2020-10-19 DIAGNOSIS — J43.9 PULMONARY EMPHYSEMA, UNSPECIFIED EMPHYSEMA TYPE (HCC): Primary | Chronic | ICD-10-CM

## 2020-10-19 DIAGNOSIS — I10 ESSENTIAL HYPERTENSION: Chronic | ICD-10-CM

## 2020-10-19 PROCEDURE — 99214 OFFICE O/P EST MOD 30 MIN: CPT | Performed by: PHYSICIAN ASSISTANT

## 2020-10-29 ENCOUNTER — OFFICE VISIT (OUTPATIENT)
Dept: UROLOGY | Facility: CLINIC | Age: 80
End: 2020-10-29
Payer: MEDICARE

## 2020-10-29 VITALS
TEMPERATURE: 97.5 F | HEIGHT: 67 IN | WEIGHT: 164.4 LBS | SYSTOLIC BLOOD PRESSURE: 112 MMHG | HEART RATE: 70 BPM | DIASTOLIC BLOOD PRESSURE: 50 MMHG | BODY MASS INDEX: 25.8 KG/M2

## 2020-10-29 DIAGNOSIS — N40.0 BENIGN PROSTATIC HYPERPLASIA WITHOUT LOWER URINARY TRACT SYMPTOMS: Chronic | ICD-10-CM

## 2020-10-29 PROCEDURE — 99204 OFFICE O/P NEW MOD 45 MIN: CPT | Performed by: UROLOGY

## 2020-11-24 ENCOUNTER — OFFICE VISIT (OUTPATIENT)
Dept: INTERNAL MEDICINE CLINIC | Facility: CLINIC | Age: 80
End: 2020-11-24
Payer: MEDICARE

## 2020-11-24 VITALS
HEIGHT: 66 IN | HEART RATE: 84 BPM | BODY MASS INDEX: 25.55 KG/M2 | TEMPERATURE: 96.9 F | WEIGHT: 159 LBS | SYSTOLIC BLOOD PRESSURE: 130 MMHG | OXYGEN SATURATION: 98 % | DIASTOLIC BLOOD PRESSURE: 70 MMHG

## 2020-11-24 DIAGNOSIS — Z00.00 MEDICARE ANNUAL WELLNESS VISIT, SUBSEQUENT: ICD-10-CM

## 2020-11-24 DIAGNOSIS — M47.26 OTHER SPONDYLOSIS WITH RADICULOPATHY, LUMBAR REGION: ICD-10-CM

## 2020-11-24 DIAGNOSIS — M16.0 PRIMARY OSTEOARTHRITIS OF BOTH HIPS: Primary | ICD-10-CM

## 2020-11-24 PROCEDURE — G0439 PPPS, SUBSEQ VISIT: HCPCS | Performed by: INTERNAL MEDICINE

## 2020-11-24 PROCEDURE — 99214 OFFICE O/P EST MOD 30 MIN: CPT | Performed by: INTERNAL MEDICINE

## 2020-11-28 ENCOUNTER — TELEPHONE (OUTPATIENT)
Dept: INTERNAL MEDICINE CLINIC | Facility: CLINIC | Age: 80
End: 2020-11-28

## 2020-12-22 ENCOUNTER — TELEPHONE (OUTPATIENT)
Dept: UROLOGY | Facility: CLINIC | Age: 80
End: 2020-12-22

## 2021-03-02 ENCOUNTER — OFFICE VISIT (OUTPATIENT)
Dept: CARDIOLOGY CLINIC | Facility: CLINIC | Age: 81
End: 2021-03-02
Payer: MEDICARE

## 2021-03-02 VITALS
HEART RATE: 76 BPM | DIASTOLIC BLOOD PRESSURE: 66 MMHG | WEIGHT: 164 LBS | SYSTOLIC BLOOD PRESSURE: 120 MMHG | OXYGEN SATURATION: 99 % | HEIGHT: 66 IN | BODY MASS INDEX: 26.36 KG/M2

## 2021-03-02 DIAGNOSIS — I25.10 ATHEROSCLEROSIS OF NATIVE CORONARY ARTERY OF NATIVE HEART WITHOUT ANGINA PECTORIS: Primary | ICD-10-CM

## 2021-03-02 DIAGNOSIS — E78.00 HYPERCHOLESTEREMIA: ICD-10-CM

## 2021-03-02 DIAGNOSIS — I10 ESSENTIAL HYPERTENSION: ICD-10-CM

## 2021-03-02 DIAGNOSIS — N40.0 BENIGN PROSTATIC HYPERPLASIA WITHOUT LOWER URINARY TRACT SYMPTOMS: ICD-10-CM

## 2021-03-02 PROCEDURE — 99213 OFFICE O/P EST LOW 20 MIN: CPT | Performed by: INTERNAL MEDICINE

## 2021-03-02 RX ORDER — TAMSULOSIN HYDROCHLORIDE 0.4 MG/1
0.4 CAPSULE ORAL DAILY
Qty: 90 CAPSULE | Refills: 1 | Status: SHIPPED | OUTPATIENT
Start: 2021-03-02 | End: 2021-08-29

## 2021-03-02 NOTE — TELEPHONE ENCOUNTER
Patient was seen in the office today by Dr Jos Olivarez and requested a refill on tamsulosin 0 4mg daily  Would like 90 day supply  [facility]  Thanks

## 2021-03-02 NOTE — PROGRESS NOTES
[facility] [address] [address]  Cardiology Follow Up    Allison Villeda  1940  [id number]      1  Atherosclerosis of native coronary artery of native heart without angina pectoris     2  Essential hypertension     3  Hypercholesteremia         Chief Complaint   Patient presents with    Follow-up       Interval History:   Patient presents for follow-up visit  Patient denies any history of chest pain shortness of breath  Patient denies any history of leg edema or orthopnea PND  No history of presyncope syncope  Patient states compliance with the present list of medications  Patient Active Problem List   Diagnosis    Hyperlipidemia    Chronic nonintractable headache    Pulmonary emphysema (HCC)    Benign prostatic hyperplasia without lower urinary tract symptoms    Carotid artery stenosis    Hx of CABG    Atherosclerosis of coronary artery    Essential hypertension    Cataract    Cervical radiculopathy    Chronic sphenoidal sinusitis    Esophageal reflux    Foraminal stenosis of thoracic region    Impaired fasting glucose    Osteoarthritis of hip    Abdominal aortic aneurysm without rupture (HCC)     Past Medical History:   Diagnosis Date    CAD (coronary artery disease)     Cervicogenic headache     Hx of CABG     Hyperlipemia     Hypertension     Renal lesion      Social History     Socioeconomic History    Marital status:       Spouse name: Not on file    Number of children: 2    Years of education: Not on file    Highest education level: Not on file   Occupational History    Not on file   Social Needs    Financial resource strain: Not on file    Food insecurity     Worry: Not on file     Inability: Not on file    Transportation needs     Medical: Not on file     Non-medical: Not on file   Tobacco Use    Smoking status: Former Smoker     Packs/day: 1 00     Years: 60 00     Pack years: 60 00     Types: Cigarettes     Quit date:      Years since quittin 1    Smokeless tobacco: Never Used   Substance and Sexual Activity    Alcohol use: Yes     Alcohol/week: 1 0 standard drinks     Types: 1 Shots of liquor per week     Frequency: 2-3 times a week     Drinks per session: 1 or 2     Binge frequency: Never     Comment: social shot once a week    Drug use: No    Sexual activity: Yes     Partners: Female   Lifestyle    Physical activity     Days per week: 4 days     Minutes per session: 150+ min    Stress:  Only a little   Relationships    Social connections     Talks on phone: Not on file     Gets together: Not on file     Attends Sikhism service: Not on file     Active member of club or organization: Not on file     Attends meetings of clubs or organizations: Not on file     Relationship status: Not on file    Intimate partner violence     Fear of current or ex partner: Not on file     Emotionally abused: Not on file     Physically abused: Not on file     Forced sexual activity: Not on file   Other Topics Concern    Not on file   Social History Narrative    Caffeine use      Family History   Problem Relation Age of Onset    Nephrolithiasis Mother     Heart attack Father     Stroke Father      Past Surgical History:   Procedure Laterality Date    CAROTID ENDARTERECTOMY Right     CORONARY ARTERY BYPASS GRAFT  2011    HERNIA REPAIR      LITHOTRIPSY      TOTAL HIP ARTHROPLASTY         Current Outpatient Medications:     albuterol (PROVENTIL HFA,VENTOLIN HFA) 90 mcg/act inhaler, Inhale 2 puffs 2 (two) times a day as needed for wheezing, Disp: 1 Inhaler, Rfl: 3    Ascorbic Acid (VITAMIN C) 100 MG tablet, Take 1 tablet by mouth daily, Disp: , Rfl:     aspirin (ECOTRIN LOW STRENGTH) 81 mg EC tablet, Take 2 tablets by mouth daily , Disp: , Rfl:     azelastine (ASTELIN) 0 1 % nasal spray, 1-2 sprays into each nostril 2 (two) times a day (Patient taking differently: 1-2 sprays into each nostril daily ), Disp: 30 mL, Rfl: 5    B Complex Vitamins (VITAMIN B COMPLEX PO), Take 1 tablet by mouth daily, Disp: , Rfl:     fluticasone (FLONASE) 50 mcg/act nasal spray, 2 sprays into each nostril daily (Patient taking differently: 2 sprays into each nostril as needed ), Disp: 16 g, Rfl: 5    metoprolol succinate (TOPROL-XL) 50 mg 24 hr tablet, Take 1 tablet (50 mg total) by mouth daily, Disp: 90 tablet, Rfl: 2    nitroglycerin (NITROSTAT) 0 4 mg SL tablet, Place 0 4 mg under the tongue as needed , Disp: , Rfl:     pantoprazole (PROTONIX) 40 mg tablet, Take 1 tablet (40 mg total) by mouth daily, Disp: 90 tablet, Rfl: 3    Pediatric Multivitamins-Fl (MULTIVITAMINS/FL PO), Take 1 capsule by mouth daily, Disp: , Rfl:     simvastatin (ZOCOR) 40 mg tablet, Take 1 tablet (40 mg total) by mouth daily at bedtime, Disp: 90 tablet, Rfl: 3    tamsulosin (FLOMAX) 0 4 mg, Take 1 capsule (0 4 mg total) by mouth daily, Disp: 90 capsule, Rfl: 3    meclizine (ANTIVERT) 25 mg tablet, Take 25 mg by mouth as needed , Disp: , Rfl: 0  Allergies   Allergen Reactions    Prednisone Other (See Comments)     Constipation         Labs:  No visits with results within 2 Month(s) from this visit  Latest known visit with results is:   Hospital Outpatient Visit on 10/13/2020   Component Date Value    Protocol Name 10/13/2020 SHILO     Time In Exercise Phase 10/13/2020 00:07:00     MAX  SYSTOLIC BP 92/46/4977 669     Max Diastolic Bp 35/00/7791 42     Max Heart Rate 10/13/2020 122     Max Predicted Heart Rate 10/13/2020 139     Reason for Termination 10/13/2020 Protocol Complete     Test Indication 10/13/2020                      Value:CAD  Chest Discomfort      Target Hr Formular 10/13/2020 (220 - Age)*85%      Imaging: No results found      Review of Systems:  Review of Systems     REVIEW OF SYSTEMS:  Constitutional:  Denies fever or chills   Eyes:  Denies change in visual acuity   HENT:  Denies nasal congestion or sore throat Respiratory:  Denies cough or shortness of breath   Cardiovascular:  Denies chest pain or edema   GI:  Denies abdominal pain, nausea, vomiting, bloody stools or diarrhea   :  Denies dysuria, frequency, difficulty in micturition and nocturia  Musculoskeletal:  Denies back pain or joint pain   Neurologic:  Denies headache, focal weakness or sensory changes   Endocrine:  Denies polyuria or polydipsia   Lymphatic:  Denies swollen glands   Psychiatric:  Denies depression or anxiety     Physical Exam:    /66   Pulse 76   Ht 5' 6" (1 676 m)   Wt 74 4 kg (164 lb)   SpO2 99%   BMI 26 47 kg/m²     Physical Exam   PHYSICAL EXAM:  General:  Patient is not in acute distress   Head: Normocephalic, Atraumatic  HEENT:  Both pupils normal-size atraumatic, normocephalic, nonicteric  Neck:  JVP not raised  Trachea central  No carotid bruit  Respiratory:  normal breath sounds no crackles  no rhonchi  Cardiovascular:  Regular rate and rhythm no S3 no murmurs  GI:  Abdomen soft nontender  No organomegaly  Lymphatic:  No cervical or inguinal lymphadenopathy  Neurologic:  Patient is awake alert, oriented   Grossly nonfocal    Extremities no edema    Discussion/Summary:   Patient with multiple medical problems who seems to be doing reasonably well from cardiac standpoint  Previous studies reviewed with patient  Medications reviewed and possible side effects discussed  concepts of cardiovascular disease , signs and symptoms of heart disease  Dietary and risk factor modification reinforced  All questions answered  Safety measures reviewed  Patient advised to report any problems prompting medical attention  symptoms to watch out from cardiac standpoint which would indicate the need for further cardiac evaluation discussed  Medications reviewed  Follow-up with primary care physician  Followup in 6 months or earlier as needed  Patient is agreeable with the plan of care

## 2021-03-25 ENCOUNTER — PROCEDURE VISIT (OUTPATIENT)
Dept: UROLOGY | Facility: CLINIC | Age: 81
End: 2021-03-25
Payer: MEDICARE

## 2021-03-25 VITALS
HEIGHT: 66 IN | DIASTOLIC BLOOD PRESSURE: 66 MMHG | SYSTOLIC BLOOD PRESSURE: 124 MMHG | HEART RATE: 77 BPM | BODY MASS INDEX: 25.71 KG/M2 | WEIGHT: 160 LBS

## 2021-03-25 DIAGNOSIS — N40.0 BENIGN PROSTATIC HYPERPLASIA WITHOUT LOWER URINARY TRACT SYMPTOMS: Primary | ICD-10-CM

## 2021-03-25 LAB — POST-VOID RESIDUAL VOLUME, ML POC: 56 ML

## 2021-03-25 PROCEDURE — 51798 US URINE CAPACITY MEASURE: CPT | Performed by: UROLOGY

## 2021-03-25 PROCEDURE — 52000 CYSTOURETHROSCOPY: CPT | Performed by: UROLOGY

## 2021-03-25 PROCEDURE — 76872 US TRANSRECTAL: CPT | Performed by: UROLOGY

## 2021-03-25 PROCEDURE — 99214 OFFICE O/P EST MOD 30 MIN: CPT | Performed by: UROLOGY

## 2021-03-25 NOTE — PROGRESS NOTES
Office Cystoscopy Procedure Note    Indication:     medically refractory lower urinary tract symptoms     Informed consent   The risks, benefits, complications, treatment options, and expected outcomes were discussed with the patient  The patient concurred with the proposed plan and provided informed consent  Anesthesia  Lidocaine jelly 2%    Antibiotic prophylaxis   None    Procedure  The patient was placed in the supineposition, was prepped and draped in the usual manner using sterile technique, and 2% lidocaine jelly instilled into the urethra  A 17 F flexible cystoscope was then inserted into the urethra and the urethra and bladder carefully examined  The following findings were noted:    Findings:  Urethra:  Normal  Prostate:   moderate bilobar hyperplasia, there is a mildly high bladder neck  However there is no median lobe  There is no intravesical prostatic protrusion  Anatomy is amenable to the Uro lift procedure  Bladder:  Normal  Ureteral orifices:  Normal  Other findings:  None     Specimens: None                 Complications:    None; patient tolerated the procedure well           Disposition: To home after 30 minute observation             Condition: Stable

## 2021-03-25 NOTE — PROGRESS NOTES
Referring Physician: Cinda Jones DO  A copy of this note was sent to the referring physician  Diagnoses and all orders for this visit:    Benign prostatic hyperplasia without lower urinary tract symptoms  -     POCT Measure PVR  -     Case request operating room: [address] [place] [address]; Standing  -     Case request operating room: CYSTOSCOPY WITH INSERTION UROLIFT    Other orders  -     Cancel: POCT urine dip  -     Diet NPO; Sips with meds; Standing  -     Place sequential compression device; Standing  -     ceFAZolin (ANCEF) 1,000 mg in dextrose 5 % 100 mL IVPB            Assessment and plan:       1  Medically refractory lower urinary tract symptoms  -weak stream, daytime frequency  -tamsulosin times years      We reviewed the options for treating BPH/LUTS which include but are not limited to expectant management, medical therapy, transurethral resection of prostate (TURP)  We also discussed minimally invasive options  At this point, the patient wishes to proceed with Urolift  This is a great option for the patient based on the following criteria:    - cystoscopy revealed  Bilobar hyperplasia  - estimated gland volume  60 g  - uroflow  Unable to be obta  - PVR with  56  - AUA SS  33  - Bother index:  5  - normal renal function  - no active urinary tract infection  - PSA:  Not applicable as patient is outside of screening criteria due to age  - no documented allergy to nickel    - He has failed the following treatment options:  tamsulosin times years   The additional morbidity of standard surgical options (ie, TURP) is not necessary given the above criteria  The risks of Urolift include but are not limited to bleeding, infection, reaction to anesthesia such as heart attack, stroke, DVT/PE, hyponatremia, bladder neck contracture, urethral stricture, injury to surrounding structures (ureters, rectum, etc)    We discussed that additional risks of trans urethral resection procedures such as incontinence, retrograde ejaculation, and erectile dysfunction have been only rarely reported with the Uro lift procedure  We did discuss that this is a new were procedure and a permanent implant  We discussed that there may be some long-term implications that her on for seen with this newer technology, such as perhaps complicating treatment for prostate cancer if indicated down the line  Finally, I told him that he may require additional procedures secondary to some of these complications  Informed consent was obtained for the Uro lift procedure  This will be scheduled in the near future to be performed under IV sedation  Lawanda Carson MD      Chief Complaint     BPH evaluation      History of Present Illness     Humera Salvador is a 79 y o  male referred in consultation for BPH  in brief,He is a extremely pleasant 79-year-old male with a history of hypertension and coronary artery disease  He has a longstanding history of lower urinary tract symptoms dating approximately 10 years  He has been managed with medical therapy in the form of tamsulosin long-term at least for the past 7-8 of these years  Despite this however he has had a bothersome weak stream as well as daytime frequency  This has affected his activities of daily living, particularly has made travel quite annoying  patient returns for cystoscopy and transrectal ultrasound for consideration of surgical options  He remains highly symptomatic  He is frustrated by difficulties especially with traveling due to his urinary frequency he has had no hematuria or interval UTIs    Detailed Urologic History     - please refer to HPI    Review of Systems     Review of Systems   Constitutional: Negative for activity change and fatigue  HENT: Negative for congestion  Eyes: Negative for visual disturbance  Respiratory: Negative for shortness of breath and wheezing  Cardiovascular: Negative for chest pain and leg swelling  Gastrointestinal: Negative for abdominal pain  Endocrine: Negative for polyuria  Genitourinary: Positive for dysuria  Negative for flank pain, hematuria and urgency  As per HPI   Musculoskeletal: Negative for back pain  Allergic/Immunologic: Negative for immunocompromised state  Neurological: Negative for dizziness and numbness  Psychiatric/Behavioral: Negative for dysphoric mood  All other systems reviewed and are negative  AUA SYMPTOM SCORE      Most Recent Value   AUA SYMPTOM SCORE   How often have you had a sensation of not emptying your bladder completely after you finished urinating? 4   How often have you had to urinate again less than two hours after you finished urinating? 4   How often have you found you stopped and started again several times when you urinate? 4   How often have you found it difficult to postpone urination? 3   How often have you had a weak urinary stream?  4   How often have you had to push or strain to begin urination? 3   How many times did you most typically get up to urinate from the time you went to bed at night until the time you got up in the morning? 4   Quality of Life: If you were to spend the rest of your life with your urinary condition just the way it is now, how would you feel about that?  5   AUA SYMPTOM SCORE  26            Allergies     Allergies   Allergen Reactions    Prednisone Other (See Comments)     Constipation         Physical Exam     Physical Exam  Constitutional:       General: He is not in acute distress  Appearance: He is well-developed  HENT:      Head: Normocephalic and atraumatic  Neck:      Musculoskeletal: Normal range of motion  Cardiovascular:      Comments: Negative lower extremity edema  Pulmonary:      Effort: Pulmonary effort is normal       Breath sounds: Normal breath sounds  Abdominal:      Palpations: Abdomen is soft     Genitourinary:     Penis: Normal        Prostate: Normal    Musculoskeletal: Normal range of motion  Skin:     General: Skin is warm  Neurological:      Mental Status: He is alert and oriented to person, place, and time     Psychiatric:         Behavior: Behavior normal              Vital Signs  Vitals:    03/25/21 1251   BP: 124/66   Pulse: 77   Weight: 72 6 kg (160 lb)   Height: 5' 6" (1 676 m)         Current Medications       Current Outpatient Medications:     albuterol (PROVENTIL HFA,VENTOLIN HFA) 90 mcg/act inhaler, Inhale 2 puffs 2 (two) times a day as needed for wheezing, Disp: 1 Inhaler, Rfl: 3    Ascorbic Acid (VITAMIN C) 100 MG tablet, Take 1 tablet by mouth daily, Disp: , Rfl:     aspirin (ECOTRIN LOW STRENGTH) 81 mg EC tablet, Take 2 tablets by mouth daily , Disp: , Rfl:     azelastine (ASTELIN) 0 1 % nasal spray, 1-2 sprays into each nostril 2 (two) times a day (Patient taking differently: 1-2 sprays into each nostril daily ), Disp: 30 mL, Rfl: 5    B Complex Vitamins (VITAMIN B COMPLEX PO), Take 1 tablet by mouth daily, Disp: , Rfl:     fluticasone (FLONASE) 50 mcg/act nasal spray, 2 sprays into each nostril daily (Patient taking differently: 2 sprays into each nostril as needed ), Disp: 16 g, Rfl: 5    meclizine (ANTIVERT) 25 mg tablet, Take 25 mg by mouth as needed , Disp: , Rfl: 0    metoprolol succinate (TOPROL-XL) 50 mg 24 hr tablet, Take 1 tablet (50 mg total) by mouth daily, Disp: 90 tablet, Rfl: 2    nitroglycerin (NITROSTAT) 0 4 mg SL tablet, Place 0 4 mg under the tongue as needed , Disp: , Rfl:     pantoprazole (PROTONIX) 40 mg tablet, Take 1 tablet (40 mg total) by mouth daily, Disp: 90 tablet, Rfl: 3    Pediatric Multivitamins-Fl (MULTIVITAMINS/FL PO), Take 1 capsule by mouth daily, Disp: , Rfl:     simvastatin (ZOCOR) 40 mg tablet, Take 1 tablet (40 mg total) by mouth daily at bedtime, Disp: 90 tablet, Rfl: 3    tamsulosin (FLOMAX) 0 4 mg, Take 1 capsule (0 4 mg total) by mouth daily, Disp: 90 capsule, Rfl: 1      Active Problems     Patient Active Problem List   Diagnosis    Hyperlipidemia    Chronic nonintractable headache    Pulmonary emphysema (HCC)    Benign prostatic hyperplasia without lower urinary tract symptoms    Carotid artery stenosis    Hx of CABG    Atherosclerosis of coronary artery    Essential hypertension    Cataract    Cervical radiculopathy    Chronic sphenoidal sinusitis    Esophageal reflux    Foraminal stenosis of thoracic region    Impaired fasting glucose    Osteoarthritis of hip    Abdominal aortic aneurysm without rupture (HCC)         Past Medical History     Past Medical History:   Diagnosis Date    CAD (coronary artery disease)     Cervicogenic headache     Hx of CABG     Hyperlipemia     Hypertension     Renal lesion          Surgical History     Past Surgical History:   Procedure Laterality Date    CAROTID ENDARTERECTOMY Right     CORONARY ARTERY BYPASS GRAFT      HERNIA REPAIR      LITHOTRIPSY      TOTAL HIP ARTHROPLASTY           Family History     Family History   Problem Relation Age of Onset    Nephrolithiasis Mother     Heart attack Father     Stroke Father          Social History     Social History     Social History     Tobacco Use   Smoking Status Former Smoker    Packs/day: 1 00    Years: 60 00    Pack years: 60 00    Types: Cigarettes    Quit date:     Years since quittin 2   Smokeless Tobacco Never Used         Pertinent Lab Values     Lab Results   Component Value Date    CREATININE 0 96 2019       Lab Results   Component Value Date    PSA 2 4 12/10/2018       @RESULTRCNT(1H])@      Pertinent Imaging      - n/a    Portions of the record may have been created with voice recognition software   Occasional wrong word or "sound a like" substitutions may have occurred due to the inherent limitations of voice recognition software   Read the chart carefully and recognize, using context, where substitutions have occurred

## 2021-03-25 NOTE — PROGRESS NOTES
Office TRUS    Indication    Prostate volumetrics for surgical planning    Transrectal ultrasonography  The patient was placed in the left lateral decubitus position  After an attentive digital rectal examination, a 7 5 mHz sidefire ultrasound probe was gently inserted into the rectum and biplanar imaging of the prostate was done with the findings noted below  Images were taken of any abnormal findings and also to document prostate size      Bladder  The bladder base appeared normal     Prostate      Ultrasound size measurements:  -Volume:   60 cm3  - Width  5 9 cm  - Height:  3 5 cm  - Length:  5 6 cm    Ultrasound findings:  -Cysts: None  -Masses: None  -Median lobe: absent

## 2021-03-26 ENCOUNTER — TELEPHONE (OUTPATIENT)
Dept: UROLOGY | Facility: CLINIC | Age: 81
End: 2021-03-26

## 2021-03-26 NOTE — TELEPHONE ENCOUNTER
LM in re to sched procedure  Surgical Co: I am holding 5/27 at [address] patient can be sched and I will mail packet when I return  Thank you

## 2021-03-29 ENCOUNTER — TELEPHONE (OUTPATIENT)
Dept: UROLOGY | Facility: MEDICAL CENTER | Age: 81
End: 2021-03-29

## 2021-03-29 NOTE — TELEPHONE ENCOUNTER
Pt stopped by BV office to schedule surgery  He did not get the message left on Friday  Per CL note below advised she is holding 5/27 in [facility] for him    I gave him her business card    Please call him    Thanks!

## 2021-03-29 NOTE — TELEPHONE ENCOUNTER
Please call patient regarding his surgery  He has several questions, including he wants to make sure insurance will pay for it and he had hernia surgery and wants to make sure the doctor knows he has mesh in his stomach    He can be reached at [phone]

## 2021-03-29 NOTE — TELEPHONE ENCOUNTER
I called pt to inform him that he should not expect a call from [facility] confirming his surgery until next week since she is out of the office until 4/5/21  There was no answer so I did leave a voicemail with that information included and instructed pt to call our office back if he had any other questions or concerns in the mean time

## 2021-03-30 NOTE — TELEPHONE ENCOUNTER
Yuly Julio MA 16 hours ago (4:17 PM)        Please call patient regarding his surgery  He has several questions, including he wants to make sure insurance will pay for it and he had hernia surgery and wants to make sure the doctor knows he has mesh in his stomach    He can be reached at [phone]

## 2021-03-30 NOTE — TELEPHONE ENCOUNTER
I returned pt 's call to once again inform him that Jesus Velez would be reaching out to him next week when she returns to discuss scheduling her procedure  I was able to speak with pt this time and tried to confirm that he received my voicemail from yesterday  He claimed that he did not but thanked me for calling him again today  Pt will wait to hear back from Jesus Velez to discuss scheduling this procedure

## 2021-04-06 ENCOUNTER — PREP FOR PROCEDURE (OUTPATIENT)
Dept: UROLOGY | Facility: CLINIC | Age: 81
End: 2021-04-06

## 2021-04-06 DIAGNOSIS — N40.0 BENIGN PROSTATIC HYPERPLASIA WITHOUT LOWER URINARY TRACT SYMPTOMS: Primary | ICD-10-CM

## 2021-04-06 NOTE — TELEPHONE ENCOUNTER
Spoke w patient and he is sched for 5/27 at [facility]  He is aware he needs a  and the hospital will call him the day prior w time of arrival  He will go for PATs the first week of May  Advised 5 - 7 day hold of aspirin products, multivitamins and fishoils  I am mailing him a surgical packet and he is aware to contact us with any questions or concerns

## 2021-05-07 ENCOUNTER — APPOINTMENT (OUTPATIENT)
Dept: LAB | Facility: HOSPITAL | Age: 81
End: 2021-05-07
Attending: UROLOGY
Payer: MEDICARE

## 2021-05-07 ENCOUNTER — OFFICE VISIT (OUTPATIENT)
Dept: LAB | Facility: HOSPITAL | Age: 81
End: 2021-05-07
Attending: UROLOGY
Payer: MEDICARE

## 2021-05-07 DIAGNOSIS — N40.0 BENIGN PROSTATIC HYPERPLASIA WITHOUT LOWER URINARY TRACT SYMPTOMS: ICD-10-CM

## 2021-05-07 LAB
ATRIAL RATE: 65 BPM
P AXIS: 79 DEGREES
PR INTERVAL: 180 MS
QRS AXIS: 64 DEGREES
QRSD INTERVAL: 94 MS
QT INTERVAL: 422 MS
QTC INTERVAL: 438 MS
T WAVE AXIS: 43 DEGREES
VENTRICULAR RATE: 65 BPM

## 2021-05-07 PROCEDURE — 87086 URINE CULTURE/COLONY COUNT: CPT

## 2021-05-07 PROCEDURE — 93010 ELECTROCARDIOGRAM REPORT: CPT | Performed by: INTERNAL MEDICINE

## 2021-05-07 PROCEDURE — 93005 ELECTROCARDIOGRAM TRACING: CPT

## 2021-05-08 LAB — BACTERIA UR CULT: NORMAL

## 2021-05-21 DIAGNOSIS — Z91.89 AT RISK FOR OBSTRUCTIVE SLEEP APNEA: Primary | ICD-10-CM

## 2021-05-21 NOTE — PRE-PROCEDURE INSTRUCTIONS
Pre-Surgery Instructions:   Medication Instructions    albuterol (PROVENTIL HFA,VENTOLIN HFA) 90 mcg/act inhaler Instructed patient per Anesthesia Guidelines  Take morning of surgery if needed    Ascorbic Acid (VITAMIN C) 100 MG tablet Instructed patient per Anesthesia Guidelines  Stop pre op 5/21    aspirin (ECOTRIN LOW STRENGTH) 81 mg EC tablet Instructed patient per Anesthesia Guidelines  Stop pre op 5/21    azelastine (ASTELIN) 0 1 % nasal spray Instructed patient per Anesthesia Guidelines  Take morning of surgery if needed    B Complex Vitamins (VITAMIN B COMPLEX PO) Instructed patient per Anesthesia Guidelines  Stop pre op 5/21    fluticasone (FLONASE) 50 mcg/act nasal spray Instructed patient per Anesthesia Guidelines  Take morning of surgery if needed    metoprolol succinate (TOPROL-XL) 50 mg 24 hr tablet Instructed patient per Anesthesia Guidelines  Take morning of surgery with sip water    nitroglycerin (NITROSTAT) 0 4 mg SL tablet Instructed patient per Anesthesia Guidelines  take morning of surgery if needed    pantoprazole (PROTONIX) 40 mg tablet Instructed patient per Anesthesia Guidelines  Take morning of surgery with sip water    Pediatric Multivitamins-Fl (MULTIVITAMINS/FL PO) Instructed patient per Anesthesia Guidelines  Stop pre op 5/21    simvastatin (ZOCOR) 40 mg tablet Instructed patient per Anesthesia Guidelines  Take morning of surgery with sip water    tamsulosin (FLOMAX) 0 4 mg Instructed patient per Anesthesia Guidelines  Take morning of surgery with sip water   Covid screening negative as per patient  Fully vaccinated-pt cannot find information on dates received, but claims he had 2 doses  Reviewed pre op medicine and showering instructions with patient via phone call, verbalizes understanding  Advised patient to avoid any NSAID'S and ASA pre op as of 5/21 but may take Tylenol products if needed   Advised NPO after MN and ASC will call with surgical scheduled time  Pt verbalized understanding  Patient verbalized interest in referral to sleep medicine after surgery

## 2021-05-24 NOTE — TELEPHONE ENCOUNTER
LM for patient advising that it is ok to wear his dentures and that there is no cutting involved with this procedure  This is something done through the urethra giving access to the prostate  Asked to cb with any further questions

## 2021-05-24 NOTE — TELEPHONE ENCOUNTER
Patient scheduled for UROLIFT with Dr Josh Whatley 5/27   Calling with questions about procedure 1)"Where they will cut him for the surgery?" 2) "Does he were his dentures?" Please call @ [phone]

## 2021-05-27 ENCOUNTER — NURSE TRIAGE (OUTPATIENT)
Dept: OTHER | Facility: OTHER | Age: 81
End: 2021-05-27

## 2021-05-27 ENCOUNTER — TELEPHONE (OUTPATIENT)
Dept: OTHER | Facility: HOSPITAL | Age: 81
End: 2021-05-27

## 2021-05-27 ENCOUNTER — ANESTHESIA (OUTPATIENT)
Dept: PERIOP | Facility: HOSPITAL | Age: 81
End: 2021-05-27
Payer: MEDICARE

## 2021-05-27 ENCOUNTER — TELEPHONE (OUTPATIENT)
Dept: UROLOGY | Facility: CLINIC | Age: 81
End: 2021-05-27

## 2021-05-27 ENCOUNTER — HOSPITAL ENCOUNTER (OUTPATIENT)
Facility: HOSPITAL | Age: 81
Setting detail: OUTPATIENT SURGERY
Discharge: HOME | End: 2021-05-27
Attending: UROLOGY | Admitting: UROLOGY
Payer: MEDICARE

## 2021-05-27 ENCOUNTER — ANESTHESIA EVENT (OUTPATIENT)
Dept: PERIOP | Facility: HOSPITAL | Age: 81
End: 2021-05-27
Payer: MEDICARE

## 2021-05-27 VITALS
WEIGHT: 155.65 LBS | HEART RATE: 56 BPM | HEIGHT: 67 IN | TEMPERATURE: 97.5 F | BODY MASS INDEX: 24.43 KG/M2 | SYSTOLIC BLOOD PRESSURE: 151 MMHG | OXYGEN SATURATION: 98 % | RESPIRATION RATE: 16 BRPM | DIASTOLIC BLOOD PRESSURE: 73 MMHG

## 2021-05-27 DIAGNOSIS — N32.89 BLADDER SPASM: Primary | ICD-10-CM

## 2021-05-27 DIAGNOSIS — N40.1 BENIGN PROSTATIC HYPERPLASIA WITH URINARY OBSTRUCTION: Primary | ICD-10-CM

## 2021-05-27 DIAGNOSIS — N13.8 BENIGN PROSTATIC HYPERPLASIA WITH URINARY OBSTRUCTION: Primary | ICD-10-CM

## 2021-05-27 PROCEDURE — 52442 CYSTO INS TRNSPRSTC IMPLT EA: CPT | Performed by: UROLOGY

## 2021-05-27 PROCEDURE — NC001 PR NO CHARGE: Performed by: UROLOGY

## 2021-05-27 PROCEDURE — 52441 CYSTO INSJ TRNSPRSTC 1 IMPLT: CPT | Performed by: UROLOGY

## 2021-05-27 PROCEDURE — L8699 PROSTHETIC IMPLANT NOS: HCPCS | Performed by: UROLOGY

## 2021-05-27 DEVICE — IMPLANT URO PROSTATE UROLIFT: Type: IMPLANTABLE DEVICE | Site: URETHRA | Status: FUNCTIONAL

## 2021-05-27 RX ORDER — DOCUSATE SODIUM 100 MG/1
100 CAPSULE, LIQUID FILLED ORAL 2 TIMES DAILY
Qty: 30 CAPSULE | Refills: 0 | Status: SHIPPED | OUTPATIENT
Start: 2021-05-27 | End: 2021-10-06 | Stop reason: ALTCHOICE

## 2021-05-27 RX ORDER — MAGNESIUM HYDROXIDE 1200 MG/15ML
LIQUID ORAL AS NEEDED
Status: DISCONTINUED | OUTPATIENT
Start: 2021-05-27 | End: 2021-05-27 | Stop reason: HOSPADM

## 2021-05-27 RX ORDER — FENTANYL CITRATE/PF 50 MCG/ML
25 SYRINGE (ML) INJECTION
Status: DISCONTINUED | OUTPATIENT
Start: 2021-05-27 | End: 2021-05-27 | Stop reason: HOSPADM

## 2021-05-27 RX ORDER — OXYBUTYNIN CHLORIDE 5 MG/1
5 TABLET, EXTENDED RELEASE ORAL DAILY
Qty: 14 TABLET | Refills: 0 | Status: SHIPPED | OUTPATIENT
Start: 2021-05-27 | End: 2021-11-16 | Stop reason: CLARIF

## 2021-05-27 RX ORDER — OXYCODONE HYDROCHLORIDE 5 MG/1
5 TABLET ORAL EVERY 4 HOURS PRN
Status: DISCONTINUED | OUTPATIENT
Start: 2021-05-27 | End: 2021-05-27 | Stop reason: HOSPADM

## 2021-05-27 RX ORDER — PROPOFOL 10 MG/ML
INJECTION, EMULSION INTRAVENOUS AS NEEDED
Status: DISCONTINUED | OUTPATIENT
Start: 2021-05-27 | End: 2021-05-27

## 2021-05-27 RX ORDER — CEFAZOLIN SODIUM 1 G/50ML
SOLUTION INTRAVENOUS AS NEEDED
Status: DISCONTINUED | OUTPATIENT
Start: 2021-05-27 | End: 2021-05-27

## 2021-05-27 RX ORDER — ONDANSETRON 2 MG/ML
4 INJECTION INTRAMUSCULAR; INTRAVENOUS ONCE AS NEEDED
Status: DISCONTINUED | OUTPATIENT
Start: 2021-05-27 | End: 2021-05-27 | Stop reason: HOSPADM

## 2021-05-27 RX ORDER — NAPROXEN 500 MG/1
500 TABLET ORAL 2 TIMES DAILY WITH MEALS
Qty: 10 TABLET | Refills: 0 | Status: SHIPPED | OUTPATIENT
Start: 2021-05-27 | End: 2021-10-06 | Stop reason: ALTCHOICE

## 2021-05-27 RX ORDER — CEPHALEXIN 500 MG/1
500 CAPSULE ORAL EVERY 6 HOURS SCHEDULED
Qty: 3 CAPSULE | Refills: 0 | Status: SHIPPED | OUTPATIENT
Start: 2021-05-27 | End: 2021-05-28

## 2021-05-27 RX ORDER — FENTANYL CITRATE 50 UG/ML
INJECTION, SOLUTION INTRAMUSCULAR; INTRAVENOUS AS NEEDED
Status: DISCONTINUED | OUTPATIENT
Start: 2021-05-27 | End: 2021-05-27

## 2021-05-27 RX ORDER — CEFAZOLIN SODIUM 1 G/50ML
1000 SOLUTION INTRAVENOUS ONCE
Status: DISCONTINUED | OUTPATIENT
Start: 2021-05-27 | End: 2021-05-27 | Stop reason: HOSPADM

## 2021-05-27 RX ORDER — PROPOFOL 10 MG/ML
INJECTION, EMULSION INTRAVENOUS CONTINUOUS PRN
Status: DISCONTINUED | OUTPATIENT
Start: 2021-05-27 | End: 2021-05-27

## 2021-05-27 RX ORDER — ACETAMINOPHEN 325 MG/1
650 TABLET ORAL EVERY 4 HOURS PRN
Qty: 30 TABLET | Refills: 0
Start: 2021-05-27 | End: 2021-10-13 | Stop reason: ALTCHOICE

## 2021-05-27 RX ORDER — PHENAZOPYRIDINE HYDROCHLORIDE 200 MG/1
200 TABLET, FILM COATED ORAL 3 TIMES DAILY PRN
Qty: 10 TABLET | Refills: 0 | Status: SHIPPED | OUTPATIENT
Start: 2021-05-27 | End: 2021-05-30

## 2021-05-27 RX ORDER — SODIUM CHLORIDE, SODIUM LACTATE, POTASSIUM CHLORIDE, CALCIUM CHLORIDE 600; 310; 30; 20 MG/100ML; MG/100ML; MG/100ML; MG/100ML
125 INJECTION, SOLUTION INTRAVENOUS CONTINUOUS
Status: DISCONTINUED | OUTPATIENT
Start: 2021-05-27 | End: 2021-05-27 | Stop reason: HOSPADM

## 2021-05-27 RX ADMIN — FENTANYL CITRATE 25 MCG: 0.05 INJECTION, SOLUTION INTRAMUSCULAR; INTRAVENOUS at 10:18

## 2021-05-27 RX ADMIN — SODIUM CHLORIDE, SODIUM LACTATE, POTASSIUM CHLORIDE, AND CALCIUM CHLORIDE 125 ML/HR: .6; .31; .03; .02 INJECTION, SOLUTION INTRAVENOUS at 08:56

## 2021-05-27 RX ADMIN — PROPOFOL 30 MG: 10 INJECTION, EMULSION INTRAVENOUS at 09:43

## 2021-05-27 RX ADMIN — FENTANYL CITRATE 50 MCG: 50 INJECTION, SOLUTION INTRAMUSCULAR; INTRAVENOUS at 09:31

## 2021-05-27 RX ADMIN — FENTANYL CITRATE 25 MCG: 0.05 INJECTION, SOLUTION INTRAMUSCULAR; INTRAVENOUS at 10:13

## 2021-05-27 RX ADMIN — FENTANYL CITRATE 50 MCG: 50 INJECTION, SOLUTION INTRAMUSCULAR; INTRAVENOUS at 09:28

## 2021-05-27 RX ADMIN — CEFAZOLIN SODIUM 1000 MG: 1 SOLUTION INTRAVENOUS at 09:20

## 2021-05-27 RX ADMIN — PROPOFOL 30 MG: 10 INJECTION, EMULSION INTRAVENOUS at 09:32

## 2021-05-27 RX ADMIN — OXYCODONE HYDROCHLORIDE 5 MG: 5 TABLET ORAL at 10:45

## 2021-05-27 RX ADMIN — PROPOFOL 30 MCG/KG/MIN: 10 INJECTION, EMULSION INTRAVENOUS at 09:32

## 2021-05-27 NOTE — NURSING NOTE
Hairston catheter education reviewed with patient and daughter  All questions answered to satisfaction  Educational material provided to patient and family

## 2021-05-27 NOTE — TELEPHONE ENCOUNTER
Void trial scheduled for 6/1 at [facility] office  Staff to call patient tomorrow to assess recovery and confirm visits

## 2021-05-27 NOTE — OP NOTE
Operative Note     PATIENT:  Nicola Honeycutt (MRN [id number])    DATE OF PROCEDURE:   5/27/2021    PRE-OP DIAGNOSES:   1) BPH with obstruction     POST-OP DIAGNOSES AND OPERATIVE FINDINGS:   1) BPH with obstruction    PROCEDURES:  1) UroLift implantation    SURGEON:   Julienne Meneses MD    No qualified teaching residents available to assist    ANESTHESIA TYPE:  General anesthesia    ESTIMATED BLOOD LOSS:   Minimal    COMPLICATIONS:   None    ANTIBIOTICS:  Cefazolin     INTRAOPERATIVE THROMBOEMBOLISM PROPHYLAXIS:  Pneumatic compression stockings    PROCEDURE SUMMARY:    The patient was identified, brought to the operating room, and placed on the table in supine position  After induction of general anesthesia, the patient was placed in dorsal lithotomy position and prepped and draped in the usual sterile fashion  A complete formal timeout was performed  A 20F cystoscope was inserted into the bladder  The cystoscopy bridge was replaced with a UroLift delivery device  The first treatment site was the patient's left side approximately 1 5 cm distal to the bladder neck  The distal tip of the delivery device was then angled laterally approximately 20 degrees at this position to compress the lateral lobe  The trigger was pulled, thereby deploying a needle containing the implant through the prostate  The needle was then retracted, allowing one end of the implant to be delivered to the capsular surface of the prostate  The implant was then tensioned to assure capsular seating and removal of slack monofilament  The device was then angled back toward midline and slowly advanced proximally until cystoscopic verification of the monofilament being centered in the delivery bay  The urethral end piece was then affixed to the monofilament thereby tailoring the size of the implant  Excess filament was then severed  The delivery device was then re-advanced into the bladder   The delivery device was then replaced with cystoscope and bridge and the implant location and opening effect was confirmed cystoscopically  The same procedure was then repeated on the right side, and two additional implants were delivered just proximal to the veru montanum, again one on left and one on right side of the prostate, following the same technique  A total of 5 implants were deployed to create a nice anterior channel  Implants were deployed in the following locations:    1  Right bladder neck  2  Left bladder neck  3  Right mid gland/apex  4,5  Left mid gland/apex X 2    A final cystoscopy was conducted first to inspect the location and state of each implant and second, to confirm the presence of a continuous anterior channel was present through the prostatic urethra with irrigation flow turned off  A zuniga catheter was then placed  The patient tolerated the procedure well and was transferred to the recovery room awake alert and in stable condition  IMPLANTS:   Implant Name Type Inv  Item Serial No   Lot No  LRB No  Used Action   IMPLANT URO PROSTATE UROLIFT - [id number]  IMPLANT URO PROSTATE UROLIFT  [employer] [id number] N/A 5 Implanted        PLAN:    Patient will be discharged home with Zuniga catheter for approximately 48 hours due to some edema noted in managing the patient's high bladder neck  Medications changes:   We will continue tamsulosin for few weeks and then discontinue the patient's postop appointment

## 2021-05-27 NOTE — TELEPHONE ENCOUNTER
Regarding: post op uro lift/ catheter is leaking at insertion point  ----- Message from Denise Corona sent at 5/27/2021  5:19 PM EDT -----  "My father had  uro lift surgery today and was discharged with a catheter   His catheter is leaking from the insertion point "

## 2021-05-27 NOTE — H&P
UROLOGY HISTORY AND PHYSICAL     Patient Identifiers: Abundio Flores (MRN [id number])      Date of Service: 2021        ASSESSMENT:     79 y o  old male with  refractory BPH presents for Uro lift  PLAN:     To OR for Uro lift      History of Present Illness: Abundio Flores is a 79 y o  old with a history of medically refractory bilobar BPH    Past Medical, Past Surgical History:     Past Medical History:   Diagnosis Date    CAD (coronary artery disease)     Cervicogenic headache     COPD (chronic obstructive pulmonary disease) (HCC)     GERD (gastroesophageal reflux disease)     Hx of CABG     Hyperlipemia     Hypertension     Kidney stone     Renal lesion    :    Past Surgical History:   Procedure Laterality Date    CAROTID ENDARTERECTOMY Right     COLONOSCOPY      CORONARY ARTERY BYPASS GRAFT  2011    HERNIA REPAIR      with mesh    JOINT REPLACEMENT Right     LITHOTRIPSY      TOTAL HIP ARTHROPLASTY Right 2015   :    Medications, Allergies:     Current Facility-Administered Medications:     ceFAZolin (ANCEF) IVPB (premix in dextrose) 1,000 mg 50 mL, 1,000 mg, Intravenous, Once, Benjamin Gunter MD    lactated ringers infusion, 125 mL/hr, Intravenous, Continuous, Ruth Reese MD, Last Rate: 125 mL/hr at 21 0856, 125 mL/hr at 21 0856    Allergies: Allergies   Allergen Reactions    Prednisone Other (See Comments)     Constipation     :    Social and Family History:   Social History:   Social History     Tobacco Use    Smoking status: Former Smoker     Packs/day: 1 00     Years: 60 00     Pack years: 60 00     Types: Cigarettes     Quit date:      Years since quittin 4    Smokeless tobacco: Never Used   Substance Use Topics    Alcohol use:  Yes     Alcohol/week: 1 0 standard drinks     Types: 1 Shots of liquor per week     Frequency: 2-3 times a week     Drinks per session: 1 or 2     Binge frequency: Never     Comment: social shot once a week    Drug use: No        Social History     Tobacco Use   Smoking Status Former Smoker    Packs/day: 1 00    Years: 60 00    Pack years: 60 00    Types: Cigarettes    Quit date:     Years since quittin 4   Smokeless Tobacco Never Used       Family History:  Family History   Problem Relation Age of Onset    Nephrolithiasis Mother     Heart attack Father     Stroke Father    :     Review of Systems:     General: Fever, chills, or night sweats: negative  Cardiac: Negative for chest pain  Pulmonary: Negative for shortness of breath  Gastrointestinal: Abdominal pain negative  Nausea, vomiting, or diarrhea negative  Genitourinary: See HPI above  Patient does nothave hematuria  All other systems queried were negative  Physical Exam:   General: Patient is pleasant and in NAD  Awake and alert  /65   Pulse 63 Comment: nsr  Temp 98 1 °F (36 7 °C) (Temporal)   Resp 18   Ht 5' 7" (1 702 m)   Wt 70 6 kg (155 lb 10 3 oz)   SpO2 100%   BMI 24 38 kg/m²   HEENT:  Normocephalic atraumatic  Cardiac:  Regular rate and rhythm, Peripheral edema: negative  Pulmonary: Non-labored breathing, CTAB  Abdomen: Soft, non-tender, non-distended  No surgical scars  No masses, tenderness, hernias noted  Genitourinary: negative CVA tenderness, neg suprapubic tenderness  Extremities: normal movement in all 4       Labs:     Lab Results   Component Value Date    HGB 15 8 2019    HCT 48 5 2019    WBC 6 12 2019     2019   ]    Lab Results   Component Value Date     2015    K 4 5 2019     (H) 2019    CO2 28 2019    BUN 18 2019    CREATININE 0 96 2019    CALCIUM 8 6 2019    GLUCOSE 102 2015   ]    Imaging:   I personally reviewed the images and report of the following studies, and reviewed them with the patient:        Thank you for allowing me to participate in this patients care    Please do not hesitate to call with any additional questions    Bridget Stacy MD

## 2021-05-27 NOTE — ANESTHESIA POSTPROCEDURE EVALUATION
Post-Op Assessment Note    CV Status:  Stable  Pain Score: 0    Pain management: adequate     Mental Status:  Sleepy   Hydration Status:  Euvolemic   PONV Controlled:  Controlled   Airway Patency:  Patent      Post Op Vitals Reviewed: Yes      Staff: CRNA         No complications documented      /57 (05/27/21 0948)    Temp 97 9 °F (36 6 °C) (05/27/21 0948)    Pulse 62 (05/27/21 0948)   Resp 16 (05/27/21 0948)    SpO2 98 % (05/27/21 0948)

## 2021-05-27 NOTE — ANESTHESIA PREPROCEDURE EVALUATION
Procedure:  CYSTOSCOPY WITH INSERTION UROLIFT (N/A Urethra)    Relevant Problems   CARDIO   (+) Abdominal aortic aneurysm without rupture (HCC)   (+) Atherosclerosis of coronary artery   (+) Essential hypertension   (+) Hx of CABG   (+) Hyperlipidemia      GI/HEPATIC   (+) Esophageal reflux      /RENAL   (+) Benign prostatic hyperplasia without lower urinary tract symptoms      MUSCULOSKELETAL   (+) Osteoarthritis of hip      NEURO/PSYCH   (+) Chronic nonintractable headache      PULMONARY   (+) Pulmonary emphysema ([address] )      Per cardiology: The patient is a 79year old  male  Chest pain status: chest pain  Coronary artery disease risk factors: dyslipidemia, hypertension, and smoking (quit 2012)  Cardiovascular history: coronary artery disease  Prior cardiovascular procedures: coronary artery bypass surgery (on 2011 x 3)  Co-morbidity: history of lung disease  GERD  Medications: a nitrate, a beta blocker, aspirin, and a lipid lowering agent  Normal stress test Oct 2020  Physical Exam    Airway    Mallampati score: III  TM Distance: >3 FB  Neck ROM: full     Dental   Comment: edentulous,     Cardiovascular  Cardiovascular exam normal    Pulmonary  Pulmonary exam normal     Other Findings  Portions of exam deferred due to low yield and/or unknown COVID status      Anesthesia Plan  ASA Score- 3     Anesthesia Type- IV sedation with anesthesia with ASA Monitors  Additional Monitors:   Airway Plan:           Plan Factors-Exercise tolerance (METS): >4 METS  Chart reviewed  Existing labs reviewed  Patient summary reviewed  Patient is not a current smoker  Induction- intravenous  Postoperative Plan-     Informed Consent- Anesthetic plan and risks discussed with patient  I personally reviewed this patient with the CRNA  Discussed and agreed on the Anesthesia Plan with the CRNA  Nilda Isaacs

## 2021-05-27 NOTE — TELEPHONE ENCOUNTER
Reason for Disposition   Leakage of urine around catheter    Answer Assessment - Initial Assessment Questions  1  SYMPTOMS: "What symptoms are you concerned about?"     Catheter is leaking from the front  2  ONSET:  "When did the symptoms start?"      This morning  3  FEVER: "Is there a fever?" If so, ask: "What is the temperature, how was it measured, and when did it start?"      none  4  ABDOMINAL PAIN: "Is there any abdominal pain?" (e g , Scale 1-10; or mild, moderate, severe)      none  5  URINE COLOR: "What color is the urine?"  "Is there blood present in the urine?" (e g , clear, yellow, cloudy, tea-colored, blood streaks, bright red)      Normal color  6  ONSET: "When was the catheter inserted?"      This morning  7  OTHER SYMPTOMS: "Do you have any other symptoms?" (e g , back pain, bad urine odor)       No pain or bad odor      Protocols used: URINARY CATHETER SYMPTOMS AND QUESTIONS-ADULT-AH

## 2021-05-27 NOTE — DISCHARGE INSTRUCTIONS
Mr Del Castillo Can:    Your surgery went very well  I was able to open a nice, wide, more natural channel within your prostate by placing 5 Uro lift implants  Please take your medications as needed for discomfort over the next few days  Most importantly please drink 6-8 glasses water per day  Your catheter will be removed on Monday - due to swelling seen in your prostate during placement of the implants  My office will call you to schedule this  Please call with any questions or concerns  Benjamin Gunter MD,PhD  [facility]  [phone]          UROLIFT      WHAT YOU NEED TO KNOW:   A UroLift is procedure that is done to open the natural channel within your prostate gland  DISCHARGE INSTRUCTIONS:   Medicines:   · Pain medicine: You may be given a prescription medicine to decrease pain  Do not wait until the pain is severe before you take these medicines:  · Tylenol (over the counter) - please take this as directed on the bottle for routine pain  · Naproxen (prescription-strength NSAID/Ibuprofen type medicine) - for moderate pain  This can be substituted with over the counter Ibuprofen if more convenient, or it this is less expensive  · Pyridium - to minimize pain and discomfort when passing your urine  Will turn your urine orange  This can be substituted with over the counter "AZO" if more convenient, or it this is less expensive  · Colace - to prevent constipation  This is also an over the counter medicine - you can purchase it without a prescription, if more convenient or less expensive  · If your pain is not well controlled using Tylenol, Naprosyn/ibuprofen please do not hesitate to call our office, you will be connected to our care team day or night and you can be issued a prescription for a narcotic pain medication    · Antibiotics:  You may be provided with antibiotics at discharge, particularly if you are being sent home with a zuniga catheter       This medicine is given to fight or prevent an infection caused by bacteria  Always take your antibiotics exactly as ordered by your healthcare provider  Do not stop taking your medicine unless directed by your healthcare provider  Never save antibiotics or take leftover antibiotics that were given to you for another illness  · Take your medicine as directed  Contact your healthcare provider if you think your medicine is not helping or if you have side effects  Tell him or her if you are allergic to any medicine  Keep a list of the medicines, vitamins, and herbs you take  Include the amounts, and when and why you take them  Bring the list or the pill bottles to follow-up visits  Carry your medicine list with you in case of an emergency  Follow up with your healthcare provider or urologist as directed: You may need to return to make sure you do not have an infection, or to have your Hairston catheter removed  Write down your questions so you remember to ask them during your visits  Hairston catheter care: You may be sent home with a Hairston catheter  If so you will be provided with instructions to remove it    · Please drink plenty of fluids  Blood in your urine is normal for few days following the procedure  Keeping well-hydrated will prevent any trouble from this    Contact your healthcare provider or urologist if:   · You have a fever  · You have new or more blood in your urine  · You have trouble starting to urinate, or have a weak stream of urine when you urinate  · You feel like you have a full bladder, even after you urinate  You may also leak urine  · You often wake up during the night to urinate  You may also feel the need to urinate right away  · You feel pain and burning when you urinate  · You feel pain or pressure in your lower abdomen  · Your urine looks cloudy, and smells bad  · You have trouble getting an erection or ejaculating       · You have questions or concerns about your condition or care  Seek care immediately or call 911 if:   · You urinate little or not at all  · You have severe abdominal or back pain  · You are dizzy or confused  · You have abdominal pain, nausea, and vomiting  · Your heartbeat is slower than usual   © 2017 [address] Information is for End User's use only and may not be sold, redistributed or otherwise used for commercial purposes  All illustrations and images included in CareNotes® are the copyrighted property of [employer]  or [employer]  The above information is an  only  It is not intended as medical advice for individual conditions or treatments  Talk to your doctor, nurse or pharmacist before following any medical regimen to see if it is safe and effective for you  Hairston Catheter Placement and Care   WHAT YOU NEED TO KNOW:   A Hairston catheter is a sterile tube that is inserted into your bladder to drain urine  It is also called an indwelling urinary catheter  DISCHARGE INSTRUCTIONS:   Seek care immediately if:   · Your catheter comes out       · You suddenly have material that looks like sand in the tubing or drainage bag      · No urine is draining into the bag and you have checked the system      · You have pain in your hip, back, pelvis, or lower abdomen      · You are confused or cannot think clearly      Call your doctor or urologist if:   · You have a fever      · You have bladder spasms for more than 1 day after the catheter is placed      · You see blood in the tubing or drainage bag      · You have a rash or itching where the catheter tube is secured to your skin      · Urine leaks from or around the catheter, tubing, or drainage bag      · The closed drainage system has accidently come open or apart       · You see a layer of crystals inside the tubing      · You have questions or concerns about your condition or care      Care for your catheter and drainage bag:  You can reduce your risk for infection and injury by caring for your catheter and drainage bag properly  · Wash your hands often  Wash before and after you touch your catheter, tubing, or drainage bag  Use soap and water  Wear clean disposable gloves when you care for your catheter or disconnect the drainage bag  Wash your hands before you prepare or eat food           · Clean your genital area 2 times every day  Clean your catheter area and anal opening after every bowel movement       ? For men: Use a soapy cloth to clean the tip of your penis  Start where the catheter enters  Wipe backward making sure to pull back the foreskin  Then use a cloth with clear water in the same direction to clean away the soap       ? For women: Use a soapy cloth to clean the area that the catheter enters your body  Make sure to separate your labia and wipe toward the anus  Then use a cloth with clear water and wipe in the same direction      · Secure the catheter tube so you do not pull or move the catheter  This helps prevent pain and bladder spasms  Healthcare providers will show you how to use medical tape or a strap to secure the catheter tube to your body       · Keep a closed drainage system  Your catheter should always be attached to the drainage bag to form a closed system  Do not disconnect any part of the closed system unless you need to change the bag      · Keep the drainage bag below the level of your waist  This helps stop urine from moving back up the tubing and into your bladder  Do not loop or kink the tubing  This can cause urine to back up and collect in your bladder  Do not let the drainage bag touch or lie on the floor      · Empty the drainage bag when needed  The weight of a full drainage bag can be painful  Empty the drainage bag every 3 to 6 hours or when it is ? full       · Clean and change the drainage bag as directed   Ask your healthcare provider how often you should change the drainage bag and what cleaning solution to use  Wear disposable gloves when you change the bag  Do not allow the end of the catheter or tubing to touch anything  Clean the ends with an alcohol pad before you reconnect them      What to do if problems develop:   · No urine is draining into the bag:      ? Check for kinks in the tubing and straighten them out       ? Check the tape or strap used to secure the catheter tube to your skin  Make sure it is not blocking the tube       ? Make sure you are not sitting or lying on the tubing      ? Make sure the urine bag is hanging below the level of your waist      · Urine leaks from or around the catheter, tubing, or drainage bag: Check if the closed drainage system has accidently come open or apart  Clean the catheter and tubing ends with a new alcohol pad and reconnect them      Follow up with your doctor or urologist as directed: Write down your questions so you remember to ask them during your visits  © Copyright [address] Information is for End User's use only and may not be sold, redistributed or otherwise used for commercial purposes  All illustrations and images included in CareNotes® are the copyrighted property of [employer] Inc  or [facility] [employer]   The above information is an  only  It is not intended as medical advice for individual conditions or treatments  Talk to your doctor, nurse or pharmacist before following any medical regimen to see if it is safe and effective for you

## 2021-05-28 NOTE — TELEPHONE ENCOUNTER
Post Op Note    Luc Burrows is a 79 y o  male s/p urolift performed 05/27/2021  Luc Burrows is a patient of Dr Jarad Travis and is seen at the [facility] office  How would you rate your pain on a scale from 1 to 10, 10 being the worst pain ever? When I urinate yes, with catheter, likely bladder spasms  Have you had a fever? No  Have your bowel movements been regular? No, previously constipated, moving good at this time  If the patient has a zuniga- are you comfortable caring for your zuniga? Yes Is it draining urine? Yes  Do you have any other questions or concerns that I can address at this time? Can he shower: yes you may shower, let the soap and water run over the area, clean the catheter from the tip of the penis outward once  No submerging in water  Bloody discharge around the catheter: this can be normal, no cause for concern    When to restart ASA: once blood clear from the urine for 24 hours then he can restart his aspirin    For the pain he notes and the leaking around the catheter he reported to the on call provider last evening (see health call encounter from last night), it is likely caused by bladder spasms  Reviewed bladder spasm etiology with patient  He notes the leaking has resolved but I cautioned him it may return from time to time  Reviewed supportive measures to help reduce bladder spasms and patient was encouraged to hydrate well with water, avoid constipation, avoid tension on the catheter and empty drainage bag when half full  He was prescribed oxybutynin by on call provider for spasm relief as well  Reviewed the side effects of this medication and encouraged him if he uses it he should stop it the day before his void trial      Reviewed what to expect with void trial and confirmed void trial scheduled 6/1  He understands the need for two appts and the address to the office provided  His questions were answered to his satisfaction and he agrees to plan

## 2021-06-01 ENCOUNTER — PROCEDURE VISIT (OUTPATIENT)
Dept: UROLOGY | Facility: CLINIC | Age: 81
End: 2021-06-01
Payer: MEDICARE

## 2021-06-01 VITALS
BODY MASS INDEX: 25.58 KG/M2 | HEART RATE: 60 BPM | DIASTOLIC BLOOD PRESSURE: 70 MMHG | WEIGHT: 163 LBS | SYSTOLIC BLOOD PRESSURE: 124 MMHG | HEIGHT: 67 IN

## 2021-06-01 DIAGNOSIS — N40.0 BENIGN PROSTATIC HYPERPLASIA WITHOUT LOWER URINARY TRACT SYMPTOMS: Primary | ICD-10-CM

## 2021-06-01 LAB — POST-VOID RESIDUAL VOLUME, ML POC: 60 ML

## 2021-06-01 PROCEDURE — 51798 US URINE CAPACITY MEASURE: CPT

## 2021-06-01 NOTE — PROGRESS NOTES
6/1/2021    Nata Mansfield  1940  [id number]    Diagnosis  Chief Complaint     Post-op          Patient presents for s/p Urolift 5/27/2021 managed by Dr Leonid Barajas  Plan  Patient will return as scheduled for follow up  Patient knows to call office in meantime with any issues  Procedure Hairston removal/voiding trial    Hairston catheter removed after deflation of an intact balloon  Patient tolerated well  Encouraged patient to hydrate well and return this afternoon for post void residual  He knows he may return early if uncomfortable and unable to urinate  Patient agrees to this plan  Patient returned this afternoon  Patient states able to void  Patient voided while in office  Bladder ultrasound performed and PVR measured 60 ml      Recent Results (from the past 1 hour(s))   POCT Measure PVR    Collection Time: 06/01/21  2:49 PM   Result Value Ref Range    POST-VOID RESIDUAL VOLUME, ML POC 60 mL           Vitals:    06/01/21 0842   BP: 124/70   BP Location: Right arm   Patient Position: Sitting   Cuff Size: Adult   Pulse: 60   Weight: 73 9 kg (163 lb)   Height: 5' 7" (1 702 m)           Destini Spann RN

## 2021-06-09 ENCOUNTER — TRANSCRIBE ORDERS (OUTPATIENT)
Dept: SLEEP CENTER | Facility: CLINIC | Age: 81
End: 2021-06-09

## 2021-07-02 ENCOUNTER — OFFICE VISIT (OUTPATIENT)
Dept: UROLOGY | Facility: CLINIC | Age: 81
End: 2021-07-02
Payer: MEDICARE

## 2021-07-02 VITALS
DIASTOLIC BLOOD PRESSURE: 60 MMHG | WEIGHT: 160 LBS | HEIGHT: 67 IN | SYSTOLIC BLOOD PRESSURE: 132 MMHG | BODY MASS INDEX: 25.11 KG/M2 | HEART RATE: 68 BPM

## 2021-07-02 DIAGNOSIS — N40.0 BENIGN PROSTATIC HYPERPLASIA WITHOUT LOWER URINARY TRACT SYMPTOMS: Primary | ICD-10-CM

## 2021-07-02 LAB — POST-VOID RESIDUAL VOLUME, ML POC: 0 ML

## 2021-07-02 PROCEDURE — 1124F ACP DISCUSS-NO DSCNMKR DOCD: CPT | Performed by: PHYSICIAN ASSISTANT

## 2021-07-02 PROCEDURE — 51798 US URINE CAPACITY MEASURE: CPT | Performed by: PHYSICIAN ASSISTANT

## 2021-07-02 PROCEDURE — 99213 OFFICE O/P EST LOW 20 MIN: CPT | Performed by: PHYSICIAN ASSISTANT

## 2021-07-02 NOTE — PROGRESS NOTES
Assessment/Plan:    Benign prostatic hyperplasia without lower urinary tract symptoms  Patient is status post UroLift with Dr Alissa Russell on 05/27/2021  Postoperatively patient had a urethral Hairston catheter and had voiding trial outpatient which he passed  He is currently taking Flomax  Patient currently reporting significant improvement in his urinary symptoms  Patient is currently satisfied  AUA score of 14 compared to 30/3 and 26 in the past     PVR today 0    uroflow reveals  Nichols curved reading with a peak flow of 16 mL/second average flow 9 2 mL/sec in volume voided at 171 mL     Discussed with patient weaning off of Flomax  Discussed with patient that he should take Flomax every day for 6 days then every day for 5 days every day for 4 days and so forth  Discussed that if he sees a significant decline in his urinary symptoms that he may restart  Flomax  Patient will follow-up in 3 months for symptom reassessment with additional PVR,  Uroflow an AUA symptom score  Patient is agreeable to plan at this time  Problem List Items Addressed This Visit        Genitourinary    Benign prostatic hyperplasia without lower urinary tract symptoms - Primary (Chronic)     Patient is status post UroLift with Dr Alissa Russell on 05/27/2021  Postoperatively patient had a urethral Hairston catheter and had voiding trial outpatient which he passed  He is currently taking Flomax  Patient currently reporting significant improvement in his urinary symptoms  Patient is currently satisfied  AUA score of 14 compared to 30/3 and 26 in the past     PVR today 0    uroflow reveals  Nichols curved reading with a peak flow of 16 mL/second average flow 9 2 mL/sec in volume voided at 171 mL     Discussed with patient weaning off of Flomax  Discussed with patient that he should take Flomax every day for 6 days then every day for 5 days every day for 4 days and so forth    Discussed that if he sees a significant decline in his urinary symptoms that he may restart  Flomax  Patient will follow-up in 3 months for symptom reassessment with additional PVR,  Uroflow an AUA symptom score  Patient is agreeable to plan at this time  Relevant Orders    POCT Measure PVR (Completed)            Subjective:      Patient ID: Britney Nassar is a 79 y o  male  HPI   Patient is an 78-year-old male s/p Urolift  With Dr Vogt Comment on 05/27/2021 presenting for a PVR and uroflow  Patient currently reporting that he continues to take Flomax at this time  He is reporting that his urinary symptoms have significantly improved prior AUA symptom scores 33 in 26 most recent AUA score 14  He reports that he is urinating once in the middle of the night  And if anything does not get up at all  He is able to hold his bladder for approximately 3 and half to 4 hours  He does not feel the urgency or frequency as much as he did prior  He feels as though his urinary stream is much stronger compared to before  He is extremely pleased with his results at this time  He denies any additional complaints  AUA SYMPTOM SCORE      Most Recent Value   AUA SYMPTOM SCORE   How often have you had a sensation of not emptying your bladder completely after you finished urinating? 1   How often have you had to urinate again less than two hours after you finished urinating? 1   How often have you found you stopped and started again several times when you urinate? 3   How often have you found it difficult to postpone urination? 2   How often have you had a weak urinary stream?  2   How often have you had to push or strain to begin urination? 1   How many times did you most typically get up to urinate from the time you went to bed at night until the time you got up in the morning?   4   Quality of Life: If you were to spend the rest of your life with your urinary condition just the way it is now, how would you feel about that?  2   AUA SYMPTOM SCORE  14          The following portions of the patient's history were reviewed and updated as appropriate:   He  has a past medical history of CAD (coronary artery disease), Cervicogenic headache, COPD (chronic obstructive pulmonary disease) ([facility] ), GERD (gastroesophageal reflux disease), CABG, Hyperlipemia, Hypertension, Kidney stone, and Renal lesion  He   Patient Active Problem List    Diagnosis Date Noted    Abdominal aortic aneurysm without rupture ([place] [address] ) 03/03/2020    Hyperlipidemia 02/05/2018    Chronic nonintractable headache 02/05/2018    Pulmonary emphysema ([place] [address] ) 02/05/2018    Benign prostatic hyperplasia without lower urinary tract symptoms 02/05/2018    Chronic sphenoidal sinusitis 12/29/2017    Carotid artery stenosis 09/08/2016    Foraminal stenosis of thoracic region 07/29/2015    Cataract 06/05/2015    Atherosclerosis of coronary artery 01/16/2015    Osteoarthritis of hip 08/07/2014    Esophageal reflux 07/17/2014    Impaired fasting glucose 07/17/2014    Cervical radiculopathy 04/24/2014    Hx of CABG 02/06/2014    Essential hypertension 02/06/2014     He  has a past surgical history that includes Coronary artery bypass graft (2011); Total hip arthroplasty (Right, 2015); Lithotripsy; Carotid endarterectomy (Right); Hernia repair; Colonoscopy; Joint replacement (Right); and pr cystourethro w/implant (N/A, 5/27/2021)  His family history includes Heart attack in his father; Nephrolithiasis in his mother; Stroke in his father  He  reports that he quit smoking about 9 years ago  His smoking use included cigarettes  He has a 60 00 pack-year smoking history  He has never used smokeless tobacco  He reports current alcohol use of about 1 0 standard drinks of alcohol per week  He reports that he does not use drugs    Current Outpatient Medications   Medication Sig Dispense Refill    acetaminophen (TYLENOL) 325 mg tablet Take 2 tablets (650 mg total) by mouth every 4 (four) hours as needed for mild pain 30 tablet 0    albuterol (PROVENTIL HFA,VENTOLIN HFA) 90 mcg/act inhaler Inhale 2 puffs 2 (two) times a day as needed for wheezing 1 Inhaler 3    Ascorbic Acid (VITAMIN C) 100 MG tablet Take 1 tablet by mouth daily      aspirin (ECOTRIN LOW STRENGTH) 81 mg EC tablet Take 2 tablets by mouth daily       azelastine (ASTELIN) 0 1 % nasal spray 1-2 sprays into each nostril 2 (two) times a day (Patient taking differently: 1-2 sprays into each nostril daily ) 30 mL 5    B Complex Vitamins (VITAMIN B COMPLEX PO) Take 1 tablet by mouth daily      Cholecalciferol 50 MCG (2000 UT) CAPS Take 1 capsule by mouth      fluticasone (FLONASE) 50 mcg/act nasal spray 2 sprays into each nostril daily (Patient taking differently: 2 sprays into each nostril as needed ) 16 g 5    metoprolol succinate (TOPROL-XL) 50 mg 24 hr tablet Take 1 tablet (50 mg total) by mouth daily 90 tablet 2    oxybutynin (DITROPAN-XL) 5 mg 24 hr tablet Take 1 tablet (5 mg total) by mouth daily 14 tablet 0    pantoprazole (PROTONIX) 40 mg tablet Take 1 tablet (40 mg total) by mouth daily 90 tablet 3    Pediatric Multivitamins-Fl (MULTIVITAMINS/FL PO) Take 1 capsule by mouth daily      simvastatin (ZOCOR) 40 mg tablet Take 1 tablet (40 mg total) by mouth daily at bedtime 90 tablet 3    tamsulosin (FLOMAX) 0 4 mg Take 1 capsule (0 4 mg total) by mouth daily 90 capsule 1    docusate sodium (COLACE) 100 mg capsule Take 1 capsule (100 mg total) by mouth 2 (two) times a day for 15 days 30 capsule 0    meclizine (ANTIVERT) 25 mg tablet Take 25 mg by mouth as needed  (Patient not taking: Reported on 7/2/2021)  0    naproxen (NAPROSYN) 500 mg tablet Take 1 tablet (500 mg total) by mouth 2 (two) times a day with meals for 5 days For pain (Patient not taking: Reported on 7/2/2021) 10 tablet 0    nitroglycerin (NITROSTAT) 0 4 mg SL tablet Place 0 4 mg under the tongue as needed  (Patient not taking: Reported on 7/2/2021)       No current facility-administered medications for this visit  He is allergic to prednisone       Review of Systems   Constitutional: Negative  Negative for chills and fever  HENT: Negative  Eyes: Negative  Respiratory: Negative  Cardiovascular: Negative  Gastrointestinal: Negative for abdominal pain, diarrhea, nausea and vomiting  Endocrine: Negative  Genitourinary: Negative for difficulty urinating, dysuria, flank pain, frequency, hematuria and urgency  Musculoskeletal: Negative  Skin: Negative  Allergic/Immunologic: Negative  Neurological: Negative  Hematological: Negative  Psychiatric/Behavioral: Negative  Objective:      /60   Pulse 68   Ht 5' 7" (1 702 m)   Wt 72 6 kg (160 lb)   BMI 25 06 kg/m²          Physical Exam  Constitutional:       General: He is not in acute distress  Appearance: He is normal weight  He is not ill-appearing, toxic-appearing or diaphoretic  HENT:      Head: Normocephalic and atraumatic  Right Ear: External ear normal       Left Ear: External ear normal       Nose: Nose normal       Mouth/Throat:      Pharynx: Oropharynx is clear  Eyes:      Conjunctiva/sclera: Conjunctivae normal    Cardiovascular:      Pulses: Normal pulses  Heart sounds: No murmur heard  No gallop  Pulmonary:      Effort: Pulmonary effort is normal  No respiratory distress  Musculoskeletal:         General: Normal range of motion  Skin:     General: Skin is warm and dry  Neurological:      General: No focal deficit present  Mental Status: He is alert and oriented to person, place, and time  Psychiatric:         Mood and Affect: Mood normal          Behavior: Behavior normal          Thought Content:  Thought content normal          Judgment: Judgment normal          Kris Marquez PA-C

## 2021-07-02 NOTE — ASSESSMENT & PLAN NOTE
Patient is status post UroLift with Dr Lakia Singleton on 05/27/2021  Postoperatively patient had a urethral Hairston catheter and had voiding trial outpatient which he passed  He is currently taking Flomax  Patient currently reporting significant improvement in his urinary symptoms  Patient is currently satisfied  AUA score of 14 compared to 30/3 and 26 in the past     PVR today 0    uroflow reveals  Nichols curved reading with a peak flow of 16 mL/second average flow 9 2 mL/sec in volume voided at 171 mL     Discussed with patient weaning off of Flomax  Discussed with patient that he should take Flomax every day for 6 days then every day for 5 days every day for 4 days and so forth  Discussed that if he sees a significant decline in his urinary symptoms that he may restart  Flomax  Patient will follow-up in 3 months for symptom reassessment with additional PVR,  Uroflow an AUA symptom score  Patient is agreeable to plan at this time

## 2021-07-16 DIAGNOSIS — K21.9 GASTROESOPHAGEAL REFLUX DISEASE WITHOUT ESOPHAGITIS: ICD-10-CM

## 2021-07-16 RX ORDER — PANTOPRAZOLE SODIUM 40 MG/1
40 TABLET, DELAYED RELEASE ORAL DAILY
Qty: 90 TABLET | Refills: 3 | Status: SHIPPED | OUTPATIENT
Start: 2021-07-16 | End: 2022-08-01 | Stop reason: SDUPTHER

## 2021-08-29 DIAGNOSIS — N40.0 BENIGN PROSTATIC HYPERPLASIA WITHOUT LOWER URINARY TRACT SYMPTOMS: ICD-10-CM

## 2021-08-29 RX ORDER — TAMSULOSIN HYDROCHLORIDE 0.4 MG/1
CAPSULE ORAL
Qty: 90 CAPSULE | Refills: 1 | Status: SHIPPED | OUTPATIENT
Start: 2021-08-29

## 2021-09-10 ENCOUNTER — OFFICE VISIT (OUTPATIENT)
Dept: INTERNAL MEDICINE CLINIC | Facility: CLINIC | Age: 81
End: 2021-09-10
Payer: MEDICARE

## 2021-09-10 VITALS
BODY MASS INDEX: 25.08 KG/M2 | HEIGHT: 67 IN | OXYGEN SATURATION: 99 % | DIASTOLIC BLOOD PRESSURE: 64 MMHG | WEIGHT: 159.8 LBS | SYSTOLIC BLOOD PRESSURE: 118 MMHG | TEMPERATURE: 97.9 F | HEART RATE: 66 BPM

## 2021-09-10 DIAGNOSIS — G47.30 SLEEP APNEA, UNSPECIFIED TYPE: Primary | ICD-10-CM

## 2021-09-10 DIAGNOSIS — I71.4 ABDOMINAL AORTIC ANEURYSM WITHOUT RUPTURE (HCC): Chronic | ICD-10-CM

## 2021-09-10 DIAGNOSIS — J43.9 PULMONARY EMPHYSEMA, UNSPECIFIED EMPHYSEMA TYPE (HCC): Chronic | ICD-10-CM

## 2021-09-10 PROCEDURE — 99214 OFFICE O/P EST MOD 30 MIN: CPT | Performed by: INTERNAL MEDICINE

## 2021-09-10 NOTE — PATIENT INSTRUCTIONS
Sleep Apnea Syndrome, Ambulatory Care   GENERAL INFORMATION:   Sleep apnea syndrome  is also called obstructive sleep apnea syndrome (OSAS), or sleep apnea  It is a condition where you stop breathing for 10 seconds or more while you are sleeping  During sleep with OSAS, the muscles and tissues around your throat relax and block air from passing through  OSAS may happen many times while you are asleep  Common symptoms include the following:   · A hard time thinking, remembering things, or focusing on your tasks the following day    · Headache or nausea    · Bedwetting or waking up often during the night to pass urine    · Feeling sleepy, slow, and tired during the day    · No signs of breathing for 10 seconds or more while you sleep    · Snoring loudly, snorting, gasping or choking while you sleep, and waking up suddenly because of these  Seek immediate care for the following symptoms:   · Chest pain    · Trouble breathing  Treatment for sleep apnea syndrome  includes using a continuous positive airway pressure (CPAP) machine to keep your airway open during sleep  A mask is placed over your nose and mouth, or just your nose  The mask is hooked to the CPAP machine, which blows a gentle stream of air into the mask when you breathe  This helps keep your airway open so you can breathe more regularly  Extra oxygen may be given to you through the machine  You may be given a mouth device  It looks like a mouth guard or dental retainer and stops your tongue and mouth tissues from blocking your throat while you sleep  Surgery may be needed to remove extra tissues that block your mouth, throat, or nose  Manage sleep apnea syndrome:   · Avoid alcohol or sedative medicine before you go to sleep  Alcohol and sedatives can relax the muscles and tissues around your throat, which can block the airflow to your lungs  · Maintain a healthy weight  Excess tissue around your throat may restrict your breathing   Ask your healthcare provider for information if you need to lose weight  · Sleep on your side or use pillows designed to prevent OSAS  This prevents your tongue or other tissues from blocking your throat  You can also raise the head of your bed  Follow up with your healthcare provider as directed:  Write down your questions so you remember to ask them during your visits  CARE AGREEMENT:   You have the right to help plan your care  Learn about your health condition and how it may be treated  Discuss treatment options with your caregivers to decide what care you want to receive  You always have the right to refuse treatment  The above information is an  only  It is not intended as medical advice for individual conditions or treatments  Talk to your doctor, nurse or pharmacist before following any medical regimen to see if it is safe and effective for you  © 2014 [address] is for End User's use only and may not be sold, redistributed or otherwise used for commercial purposes  All illustrations and images included in CareNotes® are the copyrighted property of [employer]  or Elfe[employer] Horton

## 2021-09-10 NOTE — PROGRESS NOTES
[facility] [address] [place] [address]    NAME: Nata Mansfield  AGE: 79 y o  SEX: male  : 1940     DATE: 9/10/2021     Assessment and Plan:     1  Sleep apnea, unspecified type     Symptoms consistent with sleep apnea  Has had witnessed signs of VARGHESE from family member  Will order diag sleep study  Follows with Dr Janeth Chaves for COPD and if positive can see him for VARGHESE as well  - Diagnostic Sleep Study; Future    2  COPD ([address] )    Stable  Former smoker  Follows with pulmonary    3  AAA [facility])    Prior imaging showed AAA of 3 cm  No symptoms at this time  BP controlled  Monitor  Return in about 4 months (around 1/10/2022) for Subsequent AWV  Chief Complaint:     Chief Complaint   Patient presents with    Insomnia     May need sleep study      History of Present Illness:     Concerned about sleep apnea  His daughter recently was watching him sleeping and noticed episodes of apnea, gasping, and loud snoring  Patient admits to fatigue but suspects a lot due to his age and medical conditions  Review of Systems:     Review of Systems   Constitutional: Positive for fatigue  Negative for chills and fever  Respiratory: Positive for apnea and cough  Negative for shortness of breath and wheezing  Cardiovascular: Negative  Gastrointestinal: Negative  Objective:     /64 (BP Location: Left arm, Patient Position: Sitting, Cuff Size: Standard)   Pulse 66   Temp 97 9 °F (36 6 °C) (Temporal) Comment: NO NSAIDS  Ht 5' 7" (1 702 m)   Wt 72 5 kg (159 lb 12 8 oz)   SpO2 99%   BMI 25 03 kg/m²     Physical Exam  Constitutional:       General: He is not in acute distress  Appearance: He is not ill-appearing  Cardiovascular:      Rate and Rhythm: Normal rate  Heart sounds: No murmur heard  No friction rub  Musculoskeletal:      Cervical back: No tenderness  Lymphadenopathy:      Cervical: No cervical adenopathy     Neurological:      Mental Status: He is alert         Gianni Chong DO  [facility]

## 2021-09-23 ENCOUNTER — TELEPHONE (OUTPATIENT)
Dept: SLEEP CENTER | Facility: CLINIC | Age: 81
End: 2021-09-23

## 2021-09-23 NOTE — TELEPHONE ENCOUNTER
----- Message from Nitin Hernández MD sent at 9/23/2021 12:35 PM EDT -----  approved  ----- Message -----  From: Fabián Pineda  Sent: 9/14/2021   8:18 AM EDT  To: Sleep Medicine [place] Provider    This sleep study needs approval      If approved please sign and return to clerical pool  If denied please include reasons why  Also provide alternative testing if warranted  Please sign and return to clerical pool

## 2021-09-27 NOTE — PATIENT PROFILE ADULT. - NS TRANSFER DENTURES
[Engage in a relaxing activity] : Engage in a relaxing activity [Plan in advance] : Plan in advance [Potential consequences of obesity discussed] : Potential consequences of obesity discussed [Benefits of weight loss discussed] : Benefits of weight loss discussed [Encouraged to maintain food diary] : Encouraged to maintain food diary [Encouraged to increase physical activity] : Encouraged to increase physical activity [Good understanding] : Patient has a good understanding of disease, goals and obesity follow-up plan Lower/Upper/Full/Partial

## 2021-10-01 ENCOUNTER — OFFICE VISIT (OUTPATIENT)
Dept: UROLOGY | Facility: CLINIC | Age: 81
End: 2021-10-01
Payer: MEDICARE

## 2021-10-01 VITALS
HEART RATE: 65 BPM | DIASTOLIC BLOOD PRESSURE: 58 MMHG | HEIGHT: 67 IN | RESPIRATION RATE: 16 BRPM | SYSTOLIC BLOOD PRESSURE: 108 MMHG | TEMPERATURE: 97.9 F | WEIGHT: 159 LBS | BODY MASS INDEX: 24.96 KG/M2

## 2021-10-01 DIAGNOSIS — N40.0 BENIGN PROSTATIC HYPERPLASIA WITHOUT LOWER URINARY TRACT SYMPTOMS: Primary | ICD-10-CM

## 2021-10-01 LAB
POST-VOID RESIDUAL VOLUME, ML POC: 18 ML
SL AMB  POCT GLUCOSE, UA: NORMAL
SL AMB LEUKOCYTE ESTERASE,UA: NORMAL
SL AMB POCT BILIRUBIN,UA: NORMAL
SL AMB POCT BLOOD,UA: NORMAL
SL AMB POCT CLARITY,UA: CLEAR
SL AMB POCT COLOR,UA: NORMAL
SL AMB POCT KETONES,UA: NORMAL
SL AMB POCT NITRITE,UA: NORMAL
SL AMB POCT PH,UA: 5
SL AMB POCT SPECIFIC GRAVITY,UA: 1.02
SL AMB POCT URINE PROTEIN: NORMAL
SL AMB POCT UROBILINOGEN: 0.2

## 2021-10-01 PROCEDURE — 81002 URINALYSIS NONAUTO W/O SCOPE: CPT | Performed by: PHYSICIAN ASSISTANT

## 2021-10-01 PROCEDURE — 99213 OFFICE O/P EST LOW 20 MIN: CPT | Performed by: PHYSICIAN ASSISTANT

## 2021-10-01 PROCEDURE — 51798 US URINE CAPACITY MEASURE: CPT | Performed by: PHYSICIAN ASSISTANT

## 2021-10-04 ENCOUNTER — APPOINTMENT (OUTPATIENT)
Dept: VASCULAR SURGERY | Facility: CLINIC | Age: 81
End: 2021-10-04
Payer: MEDICARE

## 2021-10-04 PROCEDURE — 99213 OFFICE O/P EST LOW 20 MIN: CPT

## 2021-10-04 PROCEDURE — 93880 EXTRACRANIAL BILAT STUDY: CPT

## 2021-10-04 PROCEDURE — 93978 VASCULAR STUDY: CPT

## 2021-10-05 NOTE — REVIEW OF SYSTEMS
[Arthralgias] : arthralgias [Negative] : Heme/Lymph [As Noted in HPI] : as noted in HPI [Dizziness] : dizziness

## 2021-10-06 ENCOUNTER — OFFICE VISIT (OUTPATIENT)
Dept: CARDIOLOGY CLINIC | Facility: CLINIC | Age: 81
End: 2021-10-06
Payer: MEDICARE

## 2021-10-06 VITALS
WEIGHT: 159.5 LBS | HEIGHT: 67 IN | DIASTOLIC BLOOD PRESSURE: 64 MMHG | SYSTOLIC BLOOD PRESSURE: 122 MMHG | HEART RATE: 76 BPM | BODY MASS INDEX: 25.03 KG/M2 | OXYGEN SATURATION: 97 %

## 2021-10-06 DIAGNOSIS — I10 ESSENTIAL HYPERTENSION: ICD-10-CM

## 2021-10-06 DIAGNOSIS — E78.00 HYPERCHOLESTEREMIA: ICD-10-CM

## 2021-10-06 DIAGNOSIS — I25.10 ATHEROSCLEROSIS OF NATIVE CORONARY ARTERY OF NATIVE HEART WITHOUT ANGINA PECTORIS: Primary | ICD-10-CM

## 2021-10-06 PROCEDURE — 99213 OFFICE O/P EST LOW 20 MIN: CPT | Performed by: INTERNAL MEDICINE

## 2021-10-08 NOTE — PHYSICAL EXAM
[2+] : left 2+ [No Rash or Lesion] : No rash or lesion [Calm] : calm [Respiratory Effort] : normal respiratory effort [Alert] : alert [Oriented to Person] : oriented to person [Oriented to Place] : oriented to place [Oriented to Time] : oriented to time [JVD] : no jugular venous distention  [Carotid Bruits] : no carotid bruits [Right Carotid Bruit] : no bruit heard over the right carotid [Left Carotid Bruit] : no bruit heard over the left carotid [Ankle Swelling (On Exam)] : not present [Varicose Veins Of Lower Extremities] : not present [] : not present [de-identified] : pleasant, here with daughter [de-identified] : NC/AT  [de-identified] : Supple  [de-identified] : grossly intact

## 2021-10-08 NOTE — ADDENDUM
[FreeTextEntry1] : This note was written by Rhea Sethi on 10/04/2021 acting as scribe for Gabriella Gonzalez M.D.\par \par I, Dr. Gabriella Youssef, personally performed the evaluation and management (E/M) services for this established patient who presents today with (an) existing condition(s).  That E/M includes conducting the examination, assessing all conditions, and (re)establishing/reinforcing a plan of care.  Today, my ACP, Tanja MULLER, was here to observe my evaluation and management services for this condition to be followed going forward.\par \par \par \par \par

## 2021-10-08 NOTE — HISTORY OF PRESENT ILLNESS
[FreeTextEntry1] : 80 y/o M w/h/o HTN, HLD, small AAA from CT scan in 2020, s/p R CEA 3/2017 presents for routine follow up. He complains of the same arthritic pain in the joints and mentions he experiences episodes of dizziness. Otherwise he denies any abdominal, chest, feet or back pain, neurological deficits, fever or chills.\par \par \par \par

## 2021-10-13 ENCOUNTER — OFFICE VISIT (OUTPATIENT)
Dept: INTERNAL MEDICINE CLINIC | Facility: CLINIC | Age: 81
End: 2021-10-13
Payer: MEDICARE

## 2021-10-13 VITALS
OXYGEN SATURATION: 97 % | HEART RATE: 63 BPM | DIASTOLIC BLOOD PRESSURE: 72 MMHG | HEIGHT: 67 IN | TEMPERATURE: 97.4 F | RESPIRATION RATE: 17 BRPM | BODY MASS INDEX: 25.58 KG/M2 | WEIGHT: 163 LBS | SYSTOLIC BLOOD PRESSURE: 118 MMHG

## 2021-10-13 DIAGNOSIS — R42 DIZZINESS: Primary | ICD-10-CM

## 2021-10-13 DIAGNOSIS — Z95.1 HX OF CABG: ICD-10-CM

## 2021-10-13 DIAGNOSIS — I65.21 STENOSIS OF RIGHT CAROTID ARTERY: ICD-10-CM

## 2021-10-13 DIAGNOSIS — E78.2 MIXED HYPERLIPIDEMIA: ICD-10-CM

## 2021-10-13 DIAGNOSIS — I10 ESSENTIAL HYPERTENSION: ICD-10-CM

## 2021-10-13 DIAGNOSIS — Z12.5 PROSTATE CANCER SCREENING: ICD-10-CM

## 2021-10-13 DIAGNOSIS — I25.10 ATHEROSCLEROSIS OF NATIVE CORONARY ARTERY OF NATIVE HEART WITHOUT ANGINA PECTORIS: ICD-10-CM

## 2021-10-13 PROCEDURE — 99214 OFFICE O/P EST MOD 30 MIN: CPT | Performed by: NURSE PRACTITIONER

## 2021-10-13 PROCEDURE — 93000 ELECTROCARDIOGRAM COMPLETE: CPT | Performed by: NURSE PRACTITIONER

## 2021-10-14 ENCOUNTER — APPOINTMENT (OUTPATIENT)
Dept: LAB | Facility: CLINIC | Age: 81
End: 2021-10-14
Payer: MEDICARE

## 2021-10-14 LAB
BACTERIA UR QL AUTO: ABNORMAL /HPF
BILIRUB UR QL STRIP: NEGATIVE
CLARITY UR: CLEAR
COLOR UR: YELLOW
GLUCOSE UR STRIP-MCNC: NEGATIVE MG/DL
HGB UR QL STRIP.AUTO: NEGATIVE
KETONES UR STRIP-MCNC: NEGATIVE MG/DL
LEUKOCYTE ESTERASE UR QL STRIP: ABNORMAL
NITRITE UR QL STRIP: NEGATIVE
NON-SQ EPI CELLS URNS QL MICRO: ABNORMAL /HPF
PH UR STRIP.AUTO: 6 [PH]
PROT UR STRIP-MCNC: NEGATIVE MG/DL
RBC #/AREA URNS AUTO: ABNORMAL /HPF
SP GR UR STRIP.AUTO: 1.02 (ref 1–1.03)
UROBILINOGEN UR QL STRIP.AUTO: 0.2 E.U./DL
WBC #/AREA URNS AUTO: ABNORMAL /HPF

## 2021-10-14 PROCEDURE — 81001 URINALYSIS AUTO W/SCOPE: CPT | Performed by: NURSE PRACTITIONER

## 2021-10-20 ENCOUNTER — TELEPHONE (OUTPATIENT)
Dept: INTERNAL MEDICINE CLINIC | Facility: CLINIC | Age: 81
End: 2021-10-20

## 2021-11-16 ENCOUNTER — OFFICE VISIT (OUTPATIENT)
Dept: INTERNAL MEDICINE CLINIC | Facility: CLINIC | Age: 81
End: 2021-11-16
Payer: MEDICARE

## 2021-11-16 VITALS
HEART RATE: 67 BPM | WEIGHT: 160 LBS | TEMPERATURE: 98.9 F | DIASTOLIC BLOOD PRESSURE: 62 MMHG | HEIGHT: 67 IN | BODY MASS INDEX: 25.11 KG/M2 | SYSTOLIC BLOOD PRESSURE: 130 MMHG | OXYGEN SATURATION: 98 %

## 2021-11-16 DIAGNOSIS — M25.551 BILATERAL HIP PAIN: Primary | ICD-10-CM

## 2021-11-16 DIAGNOSIS — Z00.00 MEDICARE ANNUAL WELLNESS VISIT, SUBSEQUENT: ICD-10-CM

## 2021-11-16 DIAGNOSIS — I10 ESSENTIAL HYPERTENSION: Chronic | ICD-10-CM

## 2021-11-16 DIAGNOSIS — M54.16 LUMBAR RADICULOPATHY: ICD-10-CM

## 2021-11-16 DIAGNOSIS — E78.2 MIXED HYPERLIPIDEMIA: Chronic | ICD-10-CM

## 2021-11-16 DIAGNOSIS — M25.552 BILATERAL HIP PAIN: Primary | ICD-10-CM

## 2021-11-16 DIAGNOSIS — R73.01 IMPAIRED FASTING GLUCOSE: Chronic | ICD-10-CM

## 2021-11-16 PROCEDURE — 99214 OFFICE O/P EST MOD 30 MIN: CPT | Performed by: INTERNAL MEDICINE

## 2021-11-16 PROCEDURE — G0444 DEPRESSION SCREEN ANNUAL: HCPCS | Performed by: INTERNAL MEDICINE

## 2021-11-16 PROCEDURE — G0439 PPPS, SUBSEQ VISIT: HCPCS | Performed by: INTERNAL MEDICINE

## 2021-11-19 DIAGNOSIS — E78.00 HYPERCHOLESTEROLEMIA: ICD-10-CM

## 2021-11-19 RX ORDER — SIMVASTATIN 40 MG
40 TABLET ORAL
Qty: 90 TABLET | Refills: 3 | Status: SHIPPED | OUTPATIENT
Start: 2021-11-19

## 2021-12-02 ENCOUNTER — TELEPHONE (OUTPATIENT)
Dept: SLEEP CENTER | Facility: CLINIC | Age: 81
End: 2021-12-02

## 2021-12-03 ENCOUNTER — HOSPITAL ENCOUNTER (OUTPATIENT)
Dept: SLEEP CENTER | Facility: CLINIC | Age: 81
Discharge: HOME | End: 2021-12-03
Payer: MEDICARE

## 2021-12-03 DIAGNOSIS — G47.30 SLEEP APNEA, UNSPECIFIED TYPE: ICD-10-CM

## 2021-12-03 PROCEDURE — 95810 POLYSOM 6/> YRS 4/> PARAM: CPT

## 2021-12-03 PROCEDURE — 95810 POLYSOM 6/> YRS 4/> PARAM: CPT | Performed by: INTERNAL MEDICINE

## 2021-12-10 ENCOUNTER — TELEPHONE (OUTPATIENT)
Dept: INTERNAL MEDICINE CLINIC | Facility: CLINIC | Age: 81
End: 2021-12-10

## 2021-12-10 ENCOUNTER — TELEPHONE (OUTPATIENT)
Dept: SLEEP CENTER | Facility: CLINIC | Age: 81
End: 2021-12-10

## 2021-12-10 DIAGNOSIS — G47.33 OBSTRUCTIVE SLEEP APNEA HYPOPNEA, MILD: Primary | ICD-10-CM

## 2021-12-11 ENCOUNTER — HOSPITAL ENCOUNTER (OUTPATIENT)
Dept: MRI IMAGING | Facility: HOSPITAL | Age: 81
Discharge: HOME | End: 2021-12-11
Attending: INTERNAL MEDICINE
Payer: MEDICARE

## 2021-12-11 ENCOUNTER — HOSPITAL ENCOUNTER (OUTPATIENT)
Dept: RADIOLOGY | Facility: HOSPITAL | Age: 81
Discharge: HOME | End: 2021-12-11
Attending: INTERNAL MEDICINE
Payer: MEDICARE

## 2021-12-11 DIAGNOSIS — M25.552 BILATERAL HIP PAIN: ICD-10-CM

## 2021-12-11 DIAGNOSIS — M54.16 LUMBAR RADICULOPATHY: ICD-10-CM

## 2021-12-11 DIAGNOSIS — M25.551 BILATERAL HIP PAIN: ICD-10-CM

## 2021-12-11 PROCEDURE — 72148 MRI LUMBAR SPINE W/O DYE: CPT

## 2021-12-11 PROCEDURE — 73502 X-RAY EXAM HIP UNI 2-3 VIEWS: CPT

## 2021-12-11 PROCEDURE — G1004 CDSM NDSC: HCPCS

## 2021-12-20 ENCOUNTER — TELEPHONE (OUTPATIENT)
Dept: INTERNAL MEDICINE CLINIC | Facility: CLINIC | Age: 81
End: 2021-12-20

## 2021-12-20 DIAGNOSIS — M48.061 LUMBAR FORAMINAL STENOSIS: ICD-10-CM

## 2021-12-20 DIAGNOSIS — M51.26 LUMBAR DISC HERNIATION: Primary | ICD-10-CM

## 2021-12-21 ENCOUNTER — TELEPHONE (OUTPATIENT)
Dept: INTERNAL MEDICINE CLINIC | Facility: CLINIC | Age: 81
End: 2021-12-21

## 2022-02-01 ENCOUNTER — OFFICE VISIT (OUTPATIENT)
Dept: INTERNAL MEDICINE CLINIC | Facility: CLINIC | Age: 82
End: 2022-02-01
Payer: MEDICARE

## 2022-02-01 VITALS
HEIGHT: 67 IN | TEMPERATURE: 97.5 F | RESPIRATION RATE: 18 BRPM | HEART RATE: 70 BPM | BODY MASS INDEX: 24.64 KG/M2 | WEIGHT: 157 LBS | SYSTOLIC BLOOD PRESSURE: 120 MMHG | OXYGEN SATURATION: 99 % | DIASTOLIC BLOOD PRESSURE: 60 MMHG

## 2022-02-01 DIAGNOSIS — I71.4 ABDOMINAL AORTIC ANEURYSM WITHOUT RUPTURE (HCC): ICD-10-CM

## 2022-02-01 DIAGNOSIS — J43.9 PULMONARY EMPHYSEMA, UNSPECIFIED EMPHYSEMA TYPE (HCC): Primary | ICD-10-CM

## 2022-02-01 DIAGNOSIS — G47.33 OSA (OBSTRUCTIVE SLEEP APNEA): ICD-10-CM

## 2022-02-01 PROCEDURE — 99214 OFFICE O/P EST MOD 30 MIN: CPT | Performed by: INTERNAL MEDICINE

## 2022-02-01 RX ORDER — ALBUTEROL SULFATE 90 UG/1
2 AEROSOL, METERED RESPIRATORY (INHALATION) 2 TIMES DAILY PRN
Qty: 18 G | Refills: 5 | Status: SHIPPED | OUTPATIENT
Start: 2022-02-01

## 2022-02-01 NOTE — PROGRESS NOTES
[facility] - [facility]    NAME: Nata Mansfield  AGE: 79 y o  SEX: male  : 1940     DATE: 2022     Assessment and Plan:     1  Pulmonary emphysema (HCC)    Continue albuterol as needed  Some increase in chest tightness with cold weather  No evidence of exacerbation in office today  - albuterol (PROVENTIL HFA,VENTOLIN HFA) 90 mcg/act inhaler; Inhale 2 puffs 2 (two) times a day as needed for wheezing  Dispense: 18 g; Refill: 5    2  Abdominal aortic aneurysm without rupture (HCC)    Mild dilation noted in the past  Monitor  3  VARGHESE (obstructive sleep apnea)    Given Rx for autoCPAP  DME has prescription and supporting documentation but currently having supply issues so hasn't been able to start using it yet  Chief Complaint:     Chief Complaint   Patient presents with    meds refill    concern about the sleeping machine     sleep apnea       History of Present Illness: Dereck Guillory presents for follow-up  Wants to get refill on his rescue inhaler  Has underlying emphysema  Some increased tightness in his chest recently with cold weather  Diagnosed with VARGHESE and waiting for DME to be able to get him auto CPAP  Review of Systems:     Review of Systems   Constitutional: Negative for chills, fatigue and fever  Respiratory: Positive for chest tightness  Negative for shortness of breath and wheezing  Cardiovascular: Negative  Gastrointestinal: Negative  Objective:     /60 (BP Location: Left arm, Patient Position: Sitting, Cuff Size: Standard)   Pulse 70   Temp 97 5 °F (36 4 °C) (Tympanic)   Resp 18   Ht 5' 7" (1 702 m)   Wt 71 2 kg (157 lb)   SpO2 99%   BMI 24 59 kg/m²     Physical Exam  Constitutional:       General: He is not in acute distress  Appearance: He is not ill-appearing  Cardiovascular:      Rate and Rhythm: Normal rate and regular rhythm  Heart sounds: No murmur heard        Pulmonary:      Effort: Pulmonary effort is normal  No respiratory distress  Breath sounds: No wheezing  Abdominal:      General: Bowel sounds are normal  There is no distension  Tenderness: There is no abdominal tenderness  Musculoskeletal:      Right lower leg: No edema  Left lower leg: No edema  Neurological:      Mental Status: He is alert         Nikia Beltran DO  [facility] [address] [place] [address]

## 2022-02-01 NOTE — PATIENT INSTRUCTIONS
COPD, Ambulatory Care   GENERAL INFORMATION:   COPD (chronic obstructive pulmonary disease)  is a lung disease that makes it hard for you to breathe  COPD is usually a result of lung damage caused by years of irritation and inflammation  COPD limits air flow in your lungs  Smoking, pollution, genetics, or a history of lung infections can increase your risk for COPD  Common symptoms include the following:   · Shortness of breath     · A dry cough     · Coughing fits that bring up mucus from your lungs     · Wheezing and chest tightness  Seek immediate care for the following symptoms:   · Confusion, dizziness, or lightheadedness    · Red, swollen, warm arm or leg    · Shortness of breath or chest pain    · Coughing up blood  Treatment for COPD  may include medicines to help decrease swelling and inflammation in your lungs  Medicines may also help open your airways or treat and infection  You may need pulmonary rehabilitation to help you manage your symptoms and improve your quality of life  You may need extra oxygen to help you breathe easier  Manage COPD and prevent an exacerbation:   · Do not smoke, and avoid others who smoke  If you smoke, it is never too late to quit  You may have fewer exacerbations  Ask for information about medicines and support programs that can help you quit  · Avoid triggers that make your symptoms worse  Cold weather and sudden temperature changes can trigger an exacerbation  Fumes from cars and chemicals, air pollution, and perfume can also increase your symptoms  · Use pursed-lip breathing when you feel short of breath  Take a deep breath in through your nose  Slowly breathe out through your mouth with your lips pursed for twice as long as you inhaled  You can also practice this breathing pattern while you bend, lift, climb stairs, or exercise  Pursed-lip breathing slows down your breathing and helps move more air in and out of your lungs             · Exercise for at least 20 minutes each day  Exercise can help increase your energy and decrease shortness of breath  Ask about the best exercise plan for you  · Prevent infections that can be dangerous when you have COPD  Get a flu vaccine every year as soon as it becomes available  Ask if you should also get other vaccines, such as those given to prevent pneumonia and tetanus  Avoid people who are sick, and wash your hands often  Follow up with your healthcare provider as directed:  Write down your questions so you remember to ask them during your visits  CARE AGREEMENT:   You have the right to help plan your care  Learn about your health condition and how it may be treated  Discuss treatment options with your caregivers to decide what care you want to receive  You always have the right to refuse treatment  The above information is an  only  It is not intended as medical advice for individual conditions or treatments  Talk to your doctor, nurse or pharmacist before following any medical regimen to see if it is safe and effective for you  © 2014 [address] is for End User's use only and may not be sold, redistributed or otherwise used for commercial purposes  All illustrations and images included in CareNotes® are the copyrighted property of [employer]  or [employer]

## 2022-03-10 DIAGNOSIS — I10 ESSENTIAL HYPERTENSION: ICD-10-CM

## 2022-03-10 RX ORDER — METOPROLOL SUCCINATE 50 MG/1
50 TABLET, EXTENDED RELEASE ORAL DAILY
Qty: 90 TABLET | Refills: 2 | Status: SHIPPED | OUTPATIENT
Start: 2022-03-10

## 2022-06-28 ENCOUNTER — APPOINTMENT (EMERGENCY)
Dept: RADIOLOGY | Facility: HOSPITAL | Age: 82
End: 2022-06-28
Payer: MEDICARE

## 2022-06-28 ENCOUNTER — HOSPITAL ENCOUNTER (EMERGENCY)
Facility: HOSPITAL | Age: 82
Discharge: HOME | End: 2022-06-28
Attending: EMERGENCY MEDICINE
Payer: MEDICARE

## 2022-06-28 VITALS
RESPIRATION RATE: 18 BRPM | SYSTOLIC BLOOD PRESSURE: 102 MMHG | OXYGEN SATURATION: 96 % | TEMPERATURE: 97.4 F | HEART RATE: 86 BPM | DIASTOLIC BLOOD PRESSURE: 52 MMHG

## 2022-06-28 DIAGNOSIS — J20.9 ACUTE BRONCHITIS WITH BRONCHOSPASM: Primary | ICD-10-CM

## 2022-06-28 LAB
ANION GAP SERPL CALCULATED.3IONS-SCNC: 8 MMOL/L (ref 4–13)
ATRIAL RATE: 76 BPM
BASOPHILS # BLD AUTO: 0.02 THOUSANDS/ΜL (ref 0–0.1)
BASOPHILS NFR BLD AUTO: 0 % (ref 0–1)
BUN SERPL-MCNC: 16 MG/DL (ref 5–25)
CALCIUM SERPL-MCNC: 8.9 MG/DL (ref 8.3–10.1)
CARDIAC TROPONIN I PNL SERPL HS: 7 NG/L
CHLORIDE SERPL-SCNC: 104 MMOL/L (ref 100–108)
CO2 SERPL-SCNC: 29 MMOL/L (ref 21–32)
CREAT SERPL-MCNC: 0.91 MG/DL (ref 0.6–1.3)
EOSINOPHIL # BLD AUTO: 0.12 THOUSAND/ΜL (ref 0–0.61)
EOSINOPHIL NFR BLD AUTO: 2 % (ref 0–6)
ERYTHROCYTE [DISTWIDTH] IN BLOOD BY AUTOMATED COUNT: 12.6 % (ref 11.6–15.1)
FLUAV RNA RESP QL NAA+PROBE: NEGATIVE
FLUBV RNA RESP QL NAA+PROBE: NEGATIVE
GFR SERPL CREATININE-BSD FRML MDRD: 77 ML/MIN/1.73SQ M
GLUCOSE SERPL-MCNC: 103 MG/DL (ref 65–140)
HCT VFR BLD AUTO: 40.5 % (ref 36.5–49.3)
HGB BLD-MCNC: 13.6 G/DL (ref 12–17)
IMM GRANULOCYTES # BLD AUTO: 0.03 THOUSAND/UL (ref 0–0.2)
IMM GRANULOCYTES NFR BLD AUTO: 0 % (ref 0–2)
LYMPHOCYTES # BLD AUTO: 1.29 THOUSANDS/ΜL (ref 0.6–4.47)
LYMPHOCYTES NFR BLD AUTO: 17 % (ref 14–44)
MCH RBC QN AUTO: 34.1 PG (ref 26.8–34.3)
MCHC RBC AUTO-ENTMCNC: 33.6 G/DL (ref 31.4–37.4)
MCV RBC AUTO: 102 FL (ref 82–98)
MONOCYTES # BLD AUTO: 0.84 THOUSAND/ΜL (ref 0.17–1.22)
MONOCYTES NFR BLD AUTO: 11 % (ref 4–12)
NEUTROPHILS # BLD AUTO: 5.24 THOUSANDS/ΜL (ref 1.85–7.62)
NEUTS SEG NFR BLD AUTO: 70 % (ref 43–75)
NRBC BLD AUTO-RTO: 0 /100 WBCS
NT-PROBNP SERPL-MCNC: 246 PG/ML
P AXIS: 80 DEGREES
PLATELET # BLD AUTO: 215 THOUSANDS/UL (ref 149–390)
PMV BLD AUTO: 9.5 FL (ref 8.9–12.7)
POTASSIUM SERPL-SCNC: 4.4 MMOL/L (ref 3.5–5.3)
PR INTERVAL: 144 MS
QRS AXIS: 67 DEGREES
QRSD INTERVAL: 86 MS
QT INTERVAL: 398 MS
QTC INTERVAL: 447 MS
RBC # BLD AUTO: 3.99 MILLION/UL (ref 3.88–5.62)
RSV RNA RESP QL NAA+PROBE: NEGATIVE
SARS-COV-2 RNA RESP QL NAA+PROBE: NEGATIVE
SODIUM SERPL-SCNC: 141 MMOL/L (ref 136–145)
T WAVE AXIS: 58 DEGREES
VENTRICULAR RATE: 76 BPM
WBC # BLD AUTO: 7.54 THOUSAND/UL (ref 4.31–10.16)

## 2022-06-28 PROCEDURE — 85025 COMPLETE CBC W/AUTO DIFF WBC: CPT | Performed by: PHYSICIAN ASSISTANT

## 2022-06-28 PROCEDURE — 93010 ELECTROCARDIOGRAM REPORT: CPT | Performed by: INTERNAL MEDICINE

## 2022-06-28 PROCEDURE — 84484 ASSAY OF TROPONIN QUANT: CPT | Performed by: PHYSICIAN ASSISTANT

## 2022-06-28 PROCEDURE — 96374 THER/PROPH/DIAG INJ IV PUSH: CPT

## 2022-06-28 PROCEDURE — 99284 EMERGENCY DEPT VISIT MOD MDM: CPT | Performed by: PHYSICIAN ASSISTANT

## 2022-06-28 PROCEDURE — 80048 BASIC METABOLIC PNL TOTAL CA: CPT | Performed by: PHYSICIAN ASSISTANT

## 2022-06-28 PROCEDURE — 71045 X-RAY EXAM CHEST 1 VIEW: CPT

## 2022-06-28 PROCEDURE — 36415 COLL VENOUS BLD VENIPUNCTURE: CPT | Performed by: PHYSICIAN ASSISTANT

## 2022-06-28 PROCEDURE — 94640 AIRWAY INHALATION TREATMENT: CPT

## 2022-06-28 PROCEDURE — 83880 ASSAY OF NATRIURETIC PEPTIDE: CPT | Performed by: PHYSICIAN ASSISTANT

## 2022-06-28 PROCEDURE — 93005 ELECTROCARDIOGRAM TRACING: CPT

## 2022-06-28 PROCEDURE — 0241U HB NFCT DS VIR RESP RNA 4 TRGT: CPT | Performed by: EMERGENCY MEDICINE

## 2022-06-28 PROCEDURE — 99284 EMERGENCY DEPT VISIT MOD MDM: CPT

## 2022-06-28 RX ORDER — ALBUTEROL SULFATE 2.5 MG/3ML
5 SOLUTION RESPIRATORY (INHALATION) EVERY 6 HOURS PRN
Qty: 75 ML | Refills: 0 | Status: SHIPPED | OUTPATIENT
Start: 2022-06-28

## 2022-06-28 RX ORDER — METHYLPREDNISOLONE 4 MG/1
TABLET ORAL
Qty: 21 TABLET | Refills: 0 | Status: SHIPPED | OUTPATIENT
Start: 2022-06-28

## 2022-06-28 RX ORDER — AZITHROMYCIN 250 MG/1
TABLET, FILM COATED ORAL
Qty: 6 TABLET | Refills: 0 | Status: SHIPPED | OUTPATIENT
Start: 2022-06-28 | End: 2022-07-02

## 2022-06-28 RX ORDER — ALBUTEROL SULFATE 2.5 MG/3ML
5 SOLUTION RESPIRATORY (INHALATION) ONCE
Status: COMPLETED | OUTPATIENT
Start: 2022-06-28 | End: 2022-06-28

## 2022-06-28 RX ORDER — METHYLPREDNISOLONE SODIUM SUCCINATE 125 MG/2ML
125 INJECTION, POWDER, LYOPHILIZED, FOR SOLUTION INTRAMUSCULAR; INTRAVENOUS ONCE
Status: COMPLETED | OUTPATIENT
Start: 2022-06-28 | End: 2022-06-28

## 2022-06-28 RX ADMIN — IPRATROPIUM BROMIDE 0.5 MG: 0.5 SOLUTION RESPIRATORY (INHALATION) at 11:46

## 2022-06-28 RX ADMIN — METHYLPREDNISOLONE SODIUM SUCCINATE 125 MG: 125 INJECTION, POWDER, FOR SOLUTION INTRAMUSCULAR; INTRAVENOUS at 11:46

## 2022-06-28 RX ADMIN — ALBUTEROL SULFATE 5 MG: 2.5 SOLUTION RESPIRATORY (INHALATION) at 11:46

## 2022-06-28 NOTE — ED PROVIDER NOTES
History  Chief Complaint   Patient presents with    Flu Symptoms     Pt reports flu symptoms that began 4 days ago, reports cough, chest/nasal congestion, and sore throat      79 y o  male with past medical history significant for CAD, HTN, HPL, COPD presents to ED with chief complaint of cough with wheezing  Onset of symptoms reported as 4 days ago  Location of symptoms reported as the chest  Quality is reported as frequent cough  Severity is reported as moderate  Associated symptoms: denies fever,  Positive for cough, positive for congestion, positive for sore throat  positive for wheezing  denies lower extremity swelling, denies rash  Denies lip/tongue/facial swelling  Modifying factors: no relief taking inhalers at home    Context: denies recent sick contacts  Denies recent travel outside the country  Immunizations reportedly up to date  Patient reports a history of COPD, states that he gets bronchitis yearly  States this episode feels similar to prior and started 4 days ago  No recent sick contacts  No relief with use of home medications  Reviewed past medical history and visits via [facility]:  Old charts reviewed:  No prior visits to this emergency department  History provided by:  Patient   used: No    Flu Symptoms  Presenting symptoms: cough, shortness of breath and sore throat    Presenting symptoms: no diarrhea, no fatigue, no fever, no headaches, no myalgias, no nausea, no rhinorrhea and no vomiting    Associated symptoms: nasal congestion    Associated symptoms: no chills, no ear pain and no neck stiffness        Prior to Admission Medications   Prescriptions Last Dose Informant Patient Reported? Taking?    Ascorbic Acid (VITAMIN C) 100 MG tablet  Self Yes No   Sig: Take 1 tablet by mouth daily   B Complex Vitamins (VITAMIN B COMPLEX PO)  Self Yes No   Sig: Take 1 tablet by mouth daily   Cholecalciferol 50 MCG (2000 UT) CAPS  Self Yes No   Sig: Take 1 capsule by mouth albuterol (PROVENTIL HFA,VENTOLIN HFA) 90 mcg/act inhaler   No No   Sig: Inhale 2 puffs 2 (two) times a day as needed for wheezing   aspirin (ECOTRIN LOW STRENGTH) 81 mg EC tablet  Self Yes No   Sig: Take 2 tablets by mouth daily    azelastine (ASTELIN) 0 1 % nasal spray  Self No No   Si-2 sprays into each nostril 2 (two) times a day   Patient taking differently: 1-2 sprays into each nostril daily    fluticasone (FLONASE) 50 mcg/act nasal spray  Self No No   Si sprays into each nostril daily   Patient taking differently: 2 sprays into each nostril as needed    metoprolol succinate (TOPROL-XL) 50 mg 24 hr tablet   No No   Sig: Take 1 tablet (50 mg total) by mouth daily   nitroglycerin (NITROSTAT) 0 4 mg SL tablet  Self Yes No   Sig: Place 0 4 mg under the tongue as needed    pantoprazole (PROTONIX) 40 mg tablet  Self No No   Sig: Take 1 tablet (40 mg total) by mouth daily   simvastatin (ZOCOR) 40 mg tablet   No No   Sig: Take 1 tablet (40 mg total) by mouth daily at bedtime   Patient not taking: Reported on 2022    tamsulosin (FLOMAX) 0 4 mg  Self No No   Sig: TAKE 1 CAPSULE BY MOUTH EVERY DAY      Facility-Administered Medications: None       Past Medical History:   Diagnosis Date    CAD (coronary artery disease)     Cervicogenic headache     COPD (chronic obstructive pulmonary disease) (HCC)     GERD (gastroesophageal reflux disease)     Hx of CABG     Hyperlipemia     Hypertension     Kidney stone     Renal lesion        Past Surgical History:   Procedure Laterality Date    CAROTID ENDARTERECTOMY Right     COLONOSCOPY      CORONARY ARTERY BYPASS GRAFT      HERNIA REPAIR      with mesh    JOINT REPLACEMENT Right     LITHOTRIPSY      SC CYSTOURETHRO W/IMPLANT N/A 2021    Procedure: CYSTOSCOPY WITH INSERTION Angel Rack;  Surgeon: Sarah Parks MD;  Location: [facility];  Service: Urology    TOTAL HIP ARTHROPLASTY Right        Family History   Problem Relation Age of Onset    Nephrolithiasis Mother     Heart attack Father     Stroke Father      I have reviewed and agree with the history as documented  E-Cigarette/Vaping    E-Cigarette Use Never User      E-Cigarette/Vaping Substances    Nicotine No     THC No     CBD No     Flavoring No     Other No     Unknown No      Social History     Tobacco Use    Smoking status: Former Smoker     Packs/day: 1 00     Years: 60 00     Pack years: 60 00     Types: Cigarettes     Quit date: 2012     Years since quitting: 10 4    Smokeless tobacco: Never Used   Vaping Use    Vaping Use: Never used   Substance Use Topics    Alcohol use: Yes     Alcohol/week: 1 0 standard drink     Types: 1 Shots of liquor per week     Comment: social shot once a week    Drug use: No       Review of Systems   Constitutional: Negative for activity change, chills, diaphoresis, fatigue, fever and unexpected weight change  HENT: Positive for congestion, postnasal drip and sore throat  Negative for dental problem, drooling, ear discharge, ear pain, facial swelling, hearing loss, mouth sores, nosebleeds, rhinorrhea, sinus pressure, sneezing, tinnitus, trouble swallowing and voice change  Eyes: Negative for photophobia, pain, discharge, redness, itching and visual disturbance  Respiratory: Positive for cough, shortness of breath and wheezing  Negative for chest tightness and stridor  Cardiovascular: Negative for chest pain, palpitations and leg swelling  Gastrointestinal: Negative for abdominal pain, constipation, diarrhea, nausea and vomiting  Endocrine: Negative for cold intolerance, heat intolerance, polydipsia, polyphagia and polyuria  Genitourinary: Negative for decreased urine volume, difficulty urinating, dysuria, flank pain, hematuria and urgency  Musculoskeletal: Negative for arthralgias, back pain, gait problem, joint swelling, myalgias, neck pain and neck stiffness  Skin: Negative for color change, pallor, rash and wound  Allergic/Immunologic: Negative for environmental allergies, food allergies and immunocompromised state  Neurological: Negative for dizziness, tremors, seizures, syncope, facial asymmetry, speech difficulty, weakness, light-headedness, numbness and headaches  Hematological: Negative for adenopathy  Does not bruise/bleed easily  Psychiatric/Behavioral: Negative for agitation, behavioral problems and confusion  The patient is not nervous/anxious  All other systems reviewed and are negative  Physical Exam  Physical Exam  Vitals and nursing note reviewed  Constitutional:       General: He is not in acute distress  Appearance: Normal appearance  He is well-developed  He is not diaphoretic  Comments: /69 (BP Location: Left arm)   Pulse 84   Temp (!) 97 4 °F (36 3 °C) (Tympanic)   Resp 18   SpO2 98%    HENT:      Head: Normocephalic and atraumatic  Right Ear: External ear normal       Left Ear: External ear normal       Nose: Nose normal       Mouth/Throat:      Pharynx: No oropharyngeal exudate or posterior oropharyngeal erythema  Eyes:      General: No scleral icterus  Right eye: No discharge  Left eye: No discharge  Extraocular Movements: Extraocular movements intact  Conjunctiva/sclera: Conjunctivae normal    Neck:      Vascular: No JVD  Trachea: No tracheal deviation  Cardiovascular:      Rate and Rhythm: Normal rate and regular rhythm  Pulses: Normal pulses  Pulmonary:      Effort: Pulmonary effort is normal  No respiratory distress  Breath sounds: Wheezing and rhonchi present  No rales  Chest:      Chest wall: No tenderness  Musculoskeletal:         General: No swelling, tenderness, deformity or signs of injury  Normal range of motion  Cervical back: Normal range of motion and neck supple  No rigidity or tenderness  Lymphadenopathy:      Cervical: No cervical adenopathy  Skin:     General: Skin is warm        Capillary Refill: Capillary refill takes less than 2 seconds  Coloration: Skin is not jaundiced or pale  Findings: No erythema or rash  Neurological:      General: No focal deficit present  Mental Status: He is alert and oriented to person, place, and time  Mental status is at baseline  Cranial Nerves: No cranial nerve deficit  Motor: No weakness or abnormal muscle tone  Gait: Gait normal    Psychiatric:         Mood and Affect: Mood normal          Behavior: Behavior normal          Thought Content:  Thought content normal          Judgment: Judgment normal          Vital Signs  ED Triage Vitals   Temperature Pulse Respirations Blood Pressure SpO2   06/28/22 1034 06/28/22 1034 06/28/22 1034 06/28/22 1034 06/28/22 1034   (!) 97 4 °F (36 3 °C) 84 18 155/69 98 %      Temp Source Heart Rate Source Patient Position - Orthostatic VS BP Location FiO2 (%)   06/28/22 1034 06/28/22 1034 06/28/22 1034 06/28/22 1034 --   Tympanic Monitor Sitting Left arm       Pain Score       06/28/22 1247       No Pain           Vitals:    06/28/22 1034 06/28/22 1247   BP: 155/69 102/52   Pulse: 84 86   Patient Position - Orthostatic VS: Sitting Lying         Visual Acuity      ED Medications  Medications   albuterol inhalation solution 5 mg (5 mg Nebulization Given 6/28/22 1146)   ipratropium (ATROVENT) 0 02 % inhalation solution 0 5 mg (0 5 mg Nebulization Given 6/28/22 1146)   methylPREDNISolone sodium succinate (Solu-MEDROL) injection 125 mg (125 mg Intravenous Given 6/28/22 1146)       Diagnostic Studies  Results Reviewed     Procedure Component Value Units Date/Time    Basic metabolic panel [[id number]] Collected: 06/28/22 1138    Lab Status: Final result Specimen: Blood from Arm, Left Updated: 06/28/22 1240     Sodium 141 mmol/L      Potassium 4 4 mmol/L      Chloride 104 mmol/L      CO2 29 mmol/L      ANION GAP 8 mmol/L      BUN 16 mg/dL      Creatinine 0 91 mg/dL      Glucose 103 mg/dL      Calcium 8 9 mg/dL eGFR 77 ml/min/1 73sq m     Narrative:      [place] guidelines for Chronic Kidney Disease (CKD):     Stage 1 with normal or high GFR (GFR > 90 mL/min/1 73 square meters)    Stage 2 Mild CKD (GFR = 60-89 mL/min/1 73 square meters)    Stage 3A Moderate CKD (GFR = 45-59 mL/min/1 73 square meters)    Stage 3B Moderate CKD (GFR = 30-44 mL/min/1 73 square meters)    Stage 4 Severe CKD (GFR = 15-29 mL/min/1 73 square meters)    Stage 5 End Stage CKD (GFR <15 mL/min/1 73 square meters)  Note: GFR calculation is accurate only with a steady state creatinine    NT-BNP PRO [[id number]]  (Normal) Collected: 06/28/22 1138    Lab Status: Final result Specimen: Blood from Arm, Left Updated: 06/28/22 1240     NT-proBNP 246 pg/mL     HS Troponin 0hr (reflex protocol) [[id number]]  (Normal) Collected: 06/28/22 1138    Lab Status: Final result Specimen: Blood from Arm, Left Updated: 06/28/22 1230     hs TnI 0hr 7 ng/L     HS Troponin I 2hr [[id number]]     Lab Status: No result Specimen: Blood     HS Troponin I 4hr [[id number]]     Lab Status: No result Specimen: Blood     CBC and differential [[id number]]  (Abnormal) Collected: 06/28/22 1138    Lab Status: Final result Specimen: Blood from Arm, Left Updated: 06/28/22 1156     WBC 7 54 Thousand/uL      RBC 3 99 Million/uL      Hemoglobin 13 6 g/dL      Hematocrit 40 5 %       fL      MCH 34 1 pg      MCHC 33 6 g/dL      RDW 12 6 %      MPV 9 5 fL      Platelets 007 Thousands/uL      nRBC 0 /100 WBCs      Neutrophils Relative 70 %      Immat GRANS % 0 %      Lymphocytes Relative 17 %      Monocytes Relative 11 %      Eosinophils Relative 2 %      Basophils Relative 0 %      Neutrophils Absolute 5 24 Thousands/µL      Immature Grans Absolute 0 03 Thousand/uL      Lymphocytes Absolute 1 29 Thousands/µL      Monocytes Absolute 0 84 Thousand/µL      Eosinophils Absolute 0 12 Thousand/µL      Basophils Absolute 0 02 Thousands/µL     COVID/FLU/RSV [[id number]]  (Normal) Collected: 06/28/22 1038    Lab Status: Final result Specimen: Nares from Nose Updated: 06/28/22 1141     SARS-CoV-2 Negative     INFLUENZA A PCR Negative     INFLUENZA B PCR Negative     RSV PCR Negative    Narrative:      FOR PEDIATRIC PATIENTS - copy/paste COVID Guidelines URL to browser: [email or web address]    SARS-CoV-2 assay is a Nucleic Acid Amplification assay intended for the  qualitative detection of nucleic acid from SARS-CoV-2 in nasopharyngeal  swabs  Results are for the presumptive identification of SARS-CoV-2 RNA  Positive results are indicative of infection with SARS-CoV-2, the virus  causing COVID-19, but do not rule out bacterial infection or co-infection  with other viruses  Laboratories within the [place] and its  territories are required to report all positive results to the appropriate  public health authorities  Negative results do not preclude SARS-CoV-2  infection and should not be used as the sole basis for treatment or other  patient management decisions  Negative results must be combined with  clinical observations, patient history, and epidemiological information  This test has not been FDA cleared or approved  This test has been authorized by FDA under an Emergency Use Authorization  (EUA)  This test is only authorized for the duration of time the  declaration that circumstances exist justifying the authorization of the  emergency use of an in vitro diagnostic tests for detection of SARS-CoV-2  virus and/or diagnosis of COVID-19 infection under section 564(b)(1) of  the Act, 21 U  S C  258PIW-9(J)(2), unless the authorization is terminated  or revoked sooner  The test has been validated but independent review by FDA  and CLIA is pending  Test performed using [employer] GeneXpert: This RT-PCR assay targets N2,  a region unique to SARS-CoV-2   A conserved region in the E-gene was chosen  for pan-Sarbecovirus detection which includes SARS-CoV-2  XR chest 1 view portable    (Results Pending)              Procedures  Procedures         ED Course  ED Course as of 06/28/22 1441   Tue Jun 28, 2022   1239 HS Troponin 0hr (reflex protocol)  Reviewed  7  No ACE  Symptoms started 4 days ago  1239 COVID/FLU/RSV  Reviewed  Negative  1239 CBC and differential(!)  Reviewed  No leukopenia or anemia  1240 CXR images independently visualized and interpreted by me  I was the initial   no infiltrate or PTX       1322 NT-BNP PRO  Reviewed  Two hundred forty-six  Normal    6127 Basic metabolic panel  Reviewed  BUN 16, creatinine 0 91  No renal failure  Normal              HEART Risk Score    Flowsheet Row Most Recent Value   Heart Score Risk Calculator    History 0 Filed at: 06/28/2022 1440   ECG 0 Filed at: 06/28/2022 1440   Age 2 Filed at: 06/28/2022 1440   Risk Factors 1 Filed at: 06/28/2022 1440   Troponin 0 Filed at: 06/28/2022 1440   HEART Score 3 Filed at: 06/28/2022 1440                                      MDM  Number of Diagnoses or Management Options  Acute bronchitis with bronchospasm: new and requires workup  Diagnosis management comments: MDM:   ddx includes but is not limited to: COPD, pneumonia,  URI, covid/flu, allergies, asthma, consider chf, ACS, doubt PE   ED plan:  CXR to evaluate for pneumonia  EKG to evaluate for ACS  CBC to evaluate for anemia, albuterol and Atrovent treatment for bronchospasm  IV Solu-Medrol for COPD exacerbation  Covid/flu test       ED results: The presentation is consistent with acute bronchitis  X-ray demonstrated no pneumonia  Patient's symptoms improved after treatment with bronchodilators here in emergency department  Discussed diagnosis of bronchitis with bronchospasm    More serious diseases of the lower respiratory tract were considered but in the absence of clinical or ancillary findings highly suggestive of such, these conditions were considered unlikely  Such diseases include but are not limited to pneumonia, asthma (extrinsic or intrinsic), influenza, retained foreign body, or occupational exposures  Other causes of cough such as GERD, pharyngitis, sinusitis, or COPD were also felt to be unlikely  At time of discharge, patient has no evidence of respiratory failure and is comfortable without respiratory distress  No evidence of airway compromise and is speaking in full/complete sentences without difficulty  Patient meets outpatient treatment criteria  Discussed discharge plan including use of bronchodilators by nebulizer  Coverage with azithromycin given history of chronic bronchitis and Medrol Dosepak  Patient reports side effects from prednisone in the past include constipation  Not an absolute contraindication for use of steroids  Instructed follow-up with PCP and pulmonology in 3-5 days recheck and further evaluation symptoms  I discussed diagnosis and treatment plan with patient at bedside  Extended discussion with patient regarding the diagnosis, pathophysiology, expectant coarse and treatment plan  Instructed to follow up with pcp and recommended specialist in 3-5 days  Reviewed reasons to return to ed  Patient verbalized understanding of diagnosis and agreement with discharge plan of care as well as understanding of reasons to return to ed  Data Reviewed/Counseling: I have reviewed the patient's vital signs, nursing notes, and other relevant ancillary testing/information  I have had a detailed discussion with the patient regarding the historical points, examination findings, and any diagnostic results  I have also discussed the need for outpatient follow-up  I have recommended symptomatic therapy directed at alleviating symptoms as care for acute bronchitis is primarily supportive     Patient has been counseled to return to the Emergency Department if symptoms worsen or if there are any questions or concerns that arise while at home  Disclaimers:    I have reasonably determine that electronically prescribing a controlled substance would be impractical for the patient to obtain the controlled substance prescribed by electronic prescription or would cause an untimely delay resulting in an adverse impact on the patient's medical condition        Patient was seen during the outbreak of the corona virus epidemic   Resources are limited due to the severity of patient illnesses associated with virus   Testing is also limited at this time   Discussed with patient at the time of this evaluation   Due to the fact that limited resources are available -treatment options are limited  Amount and/or Complexity of Data Reviewed  Clinical lab tests: ordered and reviewed  Tests in the radiology section of CPT®: ordered and reviewed  Tests in the medicine section of CPT®: ordered and reviewed  Discussion of test results with the performing providers: yes  Review and summarize past medical records: yes  Independent visualization of images, tracings, or specimens: yes        Disposition  Final diagnoses:   Acute bronchitis with bronchospasm     Time reflects when diagnosis was documented in both MDM as applicable and the Disposition within this note     Time User Action Codes Description Comment    6/28/2022  1:26 PM Zulema Epps Add [J20 9] Acute bronchitis with bronchospasm       ED Disposition     ED Disposition   Discharge    Condition   Stable    Date/Time   Tue Jun 28, 2022  1:26 PM    Comment   Nata Mansfield discharge to home/self care                 Follow-up Information     Follow up With Specialties Details Why Contact Info Additional [address] [facility], DO Internal Medicine Call in 2 days for further evaluation of symptoms [address] [facility] [address] Emergency Department Emergency Medicine Go to  If symptoms worsen [address]  [place] [id number] [facility] Emergency Department, [address] [place] [address], [place], [address]    Don Sanchez MD Pulmonology, Neurology, Pulmonary Disease, Sleep Medicine Call in 1 week for further evaluation of symptoms [address]  [address] [facility] [address] [phone]             Discharge Medication List as of 6/28/2022  1:28 PM      START taking these medications    Details   albuterol (2 5 mg/3 mL) 0 083 % nebulizer solution Take 6 mL (5 mg total) by nebulization every 6 (six) hours as needed for wheezing, Starting Tue 6/28/2022, Normal      azithromycin (ZITHROMAX) 250 mg tablet 2 tablets PO on day 1, then 1 tablet PO daily x 4 days  , Normal      methylPREDNISolone 4 MG tablet therapy pack Use as directed on package, Normal         CONTINUE these medications which have NOT CHANGED    Details   albuterol (PROVENTIL HFA,VENTOLIN HFA) 90 mcg/act inhaler Inhale 2 puffs 2 (two) times a day as needed for wheezing, Starting Tue 2/1/2022, Normal      Ascorbic Acid (VITAMIN C) 100 MG tablet Take 1 tablet by mouth daily, Historical Med      aspirin (ECOTRIN LOW STRENGTH) 81 mg EC tablet Take 2 tablets by mouth daily , Historical Med      azelastine (ASTELIN) 0 1 % nasal spray 1-2 sprays into each nostril 2 (two) times a day, Starting Tue 4/30/2019, Normal      B Complex Vitamins (VITAMIN B COMPLEX PO) Take 1 tablet by mouth daily, Historical Med      Cholecalciferol 50 MCG (2000 UT) CAPS Take 1 capsule by mouth, Historical Med      fluticasone (FLONASE) 50 mcg/act nasal spray 2 sprays into each nostril daily, Starting Tue 6/5/2018, Normal      metoprolol succinate (TOPROL-XL) 50 mg 24 hr tablet Take 1 tablet (50 mg total) by mouth daily, Starting Thu 3/10/2022, Normal      nitroglycerin (NITROSTAT) 0 4 mg SL tablet Place 0 4 mg under the tongue as needed , Starting Wed 10/18/2017, Historical Med      pantoprazole (PROTONIX) 40 mg tablet Take 1 tablet (40 mg total) by mouth daily, Starting Fri 7/16/2021, Normal      simvastatin (ZOCOR) 40 mg tablet Take 1 tablet (40 mg total) by mouth daily at bedtime, Starting Fri 11/19/2021, Normal      tamsulosin (FLOMAX) 0 4 mg TAKE 1 CAPSULE BY MOUTH EVERY DAY, Normal             No discharge procedures on file      PDMP Review     None          ED Provider  Electronically Signed by           Diego Parkinson PA-C  06/28/22 [id number]

## 2022-07-05 LAB
ATRIAL RATE: 76 BPM
P AXIS: 80 DEGREES
PR INTERVAL: 144 MS
QRS AXIS: 67 DEGREES
QRSD INTERVAL: 86 MS
QT INTERVAL: 398 MS
QTC INTERVAL: 447 MS
T WAVE AXIS: 58 DEGREES
VENTRICULAR RATE: 76 BPM

## 2022-07-05 PROCEDURE — 93010 ELECTROCARDIOGRAM REPORT: CPT | Performed by: INTERNAL MEDICINE

## 2022-08-01 DIAGNOSIS — K21.9 GASTROESOPHAGEAL REFLUX DISEASE WITHOUT ESOPHAGITIS: ICD-10-CM

## 2022-08-01 RX ORDER — PANTOPRAZOLE SODIUM 40 MG/1
40 TABLET, DELAYED RELEASE ORAL DAILY
Qty: 90 TABLET | Refills: 3 | Status: SHIPPED | OUTPATIENT
Start: 2022-08-01

## 2022-08-09 ENCOUNTER — OFFICE VISIT (OUTPATIENT)
Dept: INTERNAL MEDICINE CLINIC | Facility: CLINIC | Age: 82
End: 2022-08-09
Payer: MEDICARE

## 2022-08-09 VITALS
OXYGEN SATURATION: 96 % | TEMPERATURE: 98.4 F | BODY MASS INDEX: 24.01 KG/M2 | SYSTOLIC BLOOD PRESSURE: 120 MMHG | HEIGHT: 67 IN | HEART RATE: 80 BPM | DIASTOLIC BLOOD PRESSURE: 64 MMHG | RESPIRATION RATE: 20 BRPM | WEIGHT: 153 LBS

## 2022-08-09 DIAGNOSIS — R73.01 IMPAIRED FASTING GLUCOSE: Chronic | ICD-10-CM

## 2022-08-09 DIAGNOSIS — I25.10 ATHEROSCLEROSIS OF NATIVE CORONARY ARTERY OF NATIVE HEART WITHOUT ANGINA PECTORIS: Chronic | ICD-10-CM

## 2022-08-09 DIAGNOSIS — E78.2 MIXED HYPERLIPIDEMIA: Chronic | ICD-10-CM

## 2022-08-09 DIAGNOSIS — I10 ESSENTIAL HYPERTENSION: Primary | Chronic | ICD-10-CM

## 2022-08-09 PROCEDURE — 99214 OFFICE O/P EST MOD 30 MIN: CPT | Performed by: INTERNAL MEDICINE

## 2022-08-09 RX ORDER — DEXTROMETHORPHAN HYDROBROMIDE AND PROMETHAZINE HYDROCHLORIDE 15; 6.25 MG/5ML; MG/5ML
SYRUP ORAL
COMMUNITY
Start: 2022-06-30

## 2022-08-09 RX ORDER — PREDNISONE 10 MG/1
TABLET ORAL
COMMUNITY
Start: 2022-06-30

## 2022-08-09 RX ORDER — LEVOFLOXACIN 500 MG/1
TABLET, FILM COATED ORAL
COMMUNITY
Start: 2022-06-30 | End: 2022-08-09 | Stop reason: CLARIF

## 2022-08-09 RX ORDER — MONTELUKAST SODIUM 10 MG/1
TABLET ORAL
COMMUNITY
Start: 2022-06-30

## 2022-08-09 NOTE — PATIENT INSTRUCTIONS
Chronic Hypertension   AMBULATORY CARE:   Hypertension  is high blood pressure  Your blood pressure is the force of your blood moving against the walls of your arteries  Hypertension causes your blood pressure to get so high that your heart has to work much harder than normal  This can damage your heart  Even if you have hypertension for years, lifestyle changes, medicines, or both can help bring your blood pressure to normal   Call your local emergency number (911 in the [address] [facility]) or have someone call if:   You have chest pain  You have any of the following signs of a heart attack:      Squeezing, pressure, or pain in your chest    You may  also have any of the following:     Discomfort or pain in your back, neck, jaw, stomach, or arm    Shortness of breath    Nausea or vomiting    Lightheadedness or a sudden cold sweat    You become confused or have difficulty speaking  You suddenly feel lightheaded or have trouble breathing  Seek care immediately if:   You have a severe headache or vision loss  You have weakness in an arm or leg  Call your doctor or cardiologist if:   You feel faint, dizzy, confused, or drowsy  You have been taking your blood pressure medicine but your pressure is higher than your provider says it should be  You have questions or concerns about your condition or care  Treatment for chronic hypertension  may include medicine to lower your blood pressure and cholesterol levels  A low cholesterol level helps prevent heart disease and makes it easier to control your blood pressure  Heart disease can make your blood pressure harder to control  You may also need to make lifestyle changes  What you need to know about the stages of hypertension:       Normal blood pressure is 119/79 or lower   Your healthcare provider may only check your blood pressure each year if it stays at a normal level  Elevated blood pressure is 120/79 to 129/79   This is sometimes called prehypertension  Your healthcare provider may suggest lifestyle changes to help lower your blood pressure to a normal level  He or she may then check it again in 3 to 6 months  Stage 1 hypertension is 130/80  to 139/89   Your provider may recommend lifestyle changes, medication, and checks every 3 to 6 months until your blood pressure is controlled  Stage 2 hypertension is 140/90 or higher   Your provider will recommend lifestyle changes and have you take 2 kinds of hypertension medicines  You will also need to have your blood pressure checked monthly until it is controlled  Manage chronic hypertension:   Check your blood pressure at home  Avoid smoking, caffeine, and exercise at least 30 minutes before checking your blood pressure  Sit and rest for 5 minutes before you take your blood pressure  Extend your arm and support it on a flat surface  Your arm should be at the same level as your heart  Follow the directions that came with your blood pressure monitor  Check your blood pressure 2 times, 1 minute apart, before you take your medicine in the morning  Also check your blood pressure before your evening meal  Keep a record of your readings and bring it to your follow-up visits  Ask your healthcare provider what your blood pressure should be  Manage any other health conditions you have  Health conditions such as diabetes can increase your risk for hypertension  Follow your healthcare provider's instructions and take all your medicines as directed  Talk to your healthcare provider about any new health conditions you have recently developed  Ask about all medicines  Certain medicines can increase your blood pressure  Examples include oral birth control pills, decongestants, herbal supplements, and NSAIDs, such as ibuprofen  Your healthcare provider can tell you which medicines are safe for you to take  This includes prescription and over-the-counter medicines      Lifestyle changes you can make to lower your blood pressure: Your provider may want you to make more lifestyle changes if you are having trouble controlling your blood pressure  This may feel difficult over time, especially if you think you are making good changes but your pressure is still high  It might help to focus on one new change at a time  For example, try to add 1 more day of exercise, or exercise for an extra 10 minutes on 2 days  Small changes can make a big difference  Your healthcare provider can also refer you to specialists such as a dietitian who can help you make small changes  Your family members may be included in helping you learn to create lifestyle changes, such as the following:     Limit sodium (salt) as directed  Too much sodium can affect your fluid balance  Check labels to find low-sodium or no-salt-added foods  Some low-sodium foods use potassium salts for flavor  Too much potassium can also cause health problems  Your healthcare provider will tell you how much sodium and potassium are safe for you to have in a day  He or she may recommend that you limit sodium to 2,300 mg a day  Follow the meal plan recommended by your healthcare provider  A dietitian or your provider can give you more information on low-sodium plans or the DASH (Dietary Approaches to Stop Hypertension) eating plan  The DASH plan is low in sodium, processed sugar, unhealthy fats, and total fat  It is high in potassium, calcium, and fiber  These can be found in vegetables, fruit, and whole-grain foods  Be physically active throughout the day  Physical activity, such as exercise, can help control your blood pressure and your weight  Be physically active for at least 30 minutes per day, on most days of the week  Include aerobic activity, such as walking or riding a bicycle  Also include strength training at least 2 times each week  Your healthcare providers can help you create a physical activity plan  Decrease stress    This may help lower your blood pressure  Learn ways to relax, such as deep breathing or listening to music  Limit alcohol as directed  Alcohol can increase your blood pressure  A drink of alcohol is 12 ounces of beer, 5 ounces of wine, or 1½ ounces of liquor  Do not smoke  Nicotine and other chemicals in cigarettes and cigars can increase your blood pressure and also cause lung damage  Ask your healthcare provider for information if you currently smoke and need help to quit  E-cigarettes or smokeless tobacco still contain nicotine  Talk to your healthcare provider before you use these products  Follow up with your doctor or cardiologist as directed: You will need to return to have your blood pressure checked and to have other lab tests done  Write down your questions so you remember to ask them during your visits  © Copyright [employer] 2022 Information is for End User's use only and may not be sold, redistributed or otherwise used for commercial purposes  All illustrations and images included in CareNotes® are the copyrighted property of A D A M , Inc  or [facility]   The above information is an  only  It is not intended as medical advice for individual conditions or treatments  Talk to your doctor, nurse or pharmacist before following any medical regimen to see if it is safe and effective for you

## 2022-08-09 NOTE — PROGRESS NOTES
[facility]    NAME: Nata Mansfield  AGE: 79 y o  SEX: male  : 1940     DATE: 2022     Assessment and Plan:     1  Essential hypertension    Blood pressure well controlled in the office  Continue current antihypertensives  2  Mixed hyperlipidemia    Stable on simvastatin  Will continue at current dose  - Comprehensive metabolic panel; Future  - Lipid panel; Future    3  Atherosclerosis of native coronary artery of native heart without angina pectoris    Stable without angina  Continue current cardiac medications as prescribed  4  Impaired fasting glucose    Watch carb intake  Will check A1c before next appointment  - Hemoglobin A1C; Future      Depression Screening and Follow-up Plan: Patient was screened for depression during today's encounter  They screened negative with a PHQ-2 score of 0  Return in about 6 months (around 2023) for Subsequent AWV  History of Present Illness:     Patient is doing very well recently  He did have a bout of bronchitis back in   He saw his lung specialist which help clear things up  Denies any breathing difficulties  Denies any cough currently  Denies any cardiac symptoms  Review of Systems:     Review of Systems   Constitutional: Negative  Respiratory: Negative  Cardiovascular: Negative  Gastrointestinal: Negative  Musculoskeletal: Negative  Objective:     /64 (BP Location: Left arm, Patient Position: Sitting, Cuff Size: Large)   Pulse 80   Temp 98 4 °F (36 9 °C) (Tympanic)   Resp 20   Ht 5' 7" (1 702 m)   Wt 69 4 kg (153 lb)   SpO2 96%   BMI 23 96 kg/m²     Physical Exam  Constitutional:       General: He is not in acute distress  Appearance: He is not ill-appearing  Cardiovascular:      Rate and Rhythm: Normal rate and regular rhythm  Heart sounds: No murmur heard    Pulmonary:      Effort: Pulmonary effort is normal  No respiratory distress  Breath sounds: No wheezing  Abdominal:      General: Bowel sounds are normal  There is no distension  Tenderness: There is no abdominal tenderness  Musculoskeletal:      Right lower leg: No edema  Left lower leg: No edema  Neurological:      Mental Status: He is alert         Cody Holman DO  [facility]

## 2022-10-03 ENCOUNTER — APPOINTMENT (OUTPATIENT)
Dept: VASCULAR SURGERY | Facility: CLINIC | Age: 82
End: 2022-10-03

## 2022-12-07 DIAGNOSIS — E78.00 HYPERCHOLESTEROLEMIA: ICD-10-CM

## 2022-12-07 DIAGNOSIS — I10 ESSENTIAL HYPERTENSION: ICD-10-CM

## 2022-12-07 RX ORDER — METOPROLOL SUCCINATE 50 MG/1
50 TABLET, EXTENDED RELEASE ORAL DAILY
Qty: 90 TABLET | Refills: 2 | Status: SHIPPED | OUTPATIENT
Start: 2022-12-07

## 2022-12-08 RX ORDER — SIMVASTATIN 40 MG
40 TABLET ORAL
Qty: 90 TABLET | Refills: 3 | Status: SHIPPED | OUTPATIENT
Start: 2022-12-08

## 2023-01-03 ENCOUNTER — OFFICE VISIT (OUTPATIENT)
Dept: UROLOGY | Facility: CLINIC | Age: 83
End: 2023-01-03

## 2023-01-03 VITALS
OXYGEN SATURATION: 100 % | WEIGHT: 152 LBS | BODY MASS INDEX: 23.86 KG/M2 | DIASTOLIC BLOOD PRESSURE: 62 MMHG | HEIGHT: 67 IN | SYSTOLIC BLOOD PRESSURE: 128 MMHG | HEART RATE: 66 BPM

## 2023-01-03 DIAGNOSIS — N40.0 BENIGN PROSTATIC HYPERPLASIA WITHOUT LOWER URINARY TRACT SYMPTOMS: ICD-10-CM

## 2023-01-03 RX ORDER — TAMSULOSIN HYDROCHLORIDE 0.4 MG/1
0.4 CAPSULE ORAL DAILY
Qty: 90 CAPSULE | Refills: 3 | Status: SHIPPED | OUTPATIENT
Start: 2023-01-03

## 2023-01-03 NOTE — PROGRESS NOTES
1/3/2023      Chief Complaint   Patient presents with   • Benign Prostatic Hypertrophy         Assessment and Plan    79 y o  male     1  BPH with lower urinary tract symptoms status post UroLift (5/27/2021)  -Continues with nocturia as well as daytime frequency and urgency  - Discussed conservative measures with adequate hydration, avoidance of bladder irritants, avoidance of constipation, avoidance of excessive fluid intake after dinner, double voiding prior to bed  - Discussed reinitiation of Flomax as he was doing well with this medication in the past  - Follow up in 8 weeks for symptom reassessment  - Call with any questions or concerns in the meantime  - All questions answered; patient understands and agrees with plan      History of Present Illness  Khai Richards is a 79 y o  male patient with history of BPH with LUTS s/p Urolift (5/27/21) here for follow up  Patient was doing well initially after UroLift and stopped Flomax  States that he has recently been having increased nocturia as well as daytime frequency every 3 hours  Previously was taking Flomax, however, trialed stopping this  States he noticed worsening symptoms since stopping Flomax  Denies split urinary stream      Review of Systems   Constitutional: Negative for activity change, appetite change, chills and fever  HENT: Negative for congestion and trouble swallowing  Respiratory: Negative for cough and shortness of breath  Cardiovascular: Negative for chest pain, palpitations and leg swelling  Gastrointestinal: Negative for abdominal pain, constipation, diarrhea, nausea and vomiting  Genitourinary: Positive for frequency and urgency  Negative for difficulty urinating, dysuria, flank pain and hematuria  Musculoskeletal: Negative for back pain and gait problem  Skin: Negative for wound  Allergic/Immunologic: Negative for immunocompromised state  Neurological: Negative for dizziness and syncope     Hematological: Does not bruise/bleed easily  Psychiatric/Behavioral: Negative for confusion  All other systems reviewed and are negative  Vitals  Vitals:    01/03/23 0902   BP: 128/62   BP Location: Left arm   Patient Position: Sitting   Cuff Size: Adult   Pulse: 66   SpO2: 100%   Weight: 68 9 kg (152 lb)   Height: 5' 7" (1 702 m)       Physical Exam  Constitutional:       General: He is not in acute distress  Appearance: Normal appearance  He is not ill-appearing, toxic-appearing or diaphoretic  HENT:      Head: Normocephalic  Nose: No congestion  Eyes:      General: No scleral icterus  Right eye: No discharge  Left eye: No discharge  Conjunctiva/sclera: Conjunctivae normal       Pupils: Pupils are equal, round, and reactive to light  Pulmonary:      Effort: Pulmonary effort is normal    Musculoskeletal:      Cervical back: Normal range of motion  Skin:     General: Skin is warm and dry  Coloration: Skin is not jaundiced or pale  Findings: No bruising, erythema, lesion or rash  Neurological:      General: No focal deficit present  Mental Status: He is alert and oriented to person, place, and time  Mental status is at baseline  Gait: Gait normal    Psychiatric:         Mood and Affect: Mood normal          Behavior: Behavior normal          Thought Content: Thought content normal          Judgment: Judgment normal            Past History  Past Medical History:   Diagnosis Date   • Benign prostatic hyperplasia    • CAD (coronary artery disease)    • Cervicogenic headache    • COPD (chronic obstructive pulmonary disease) (HCC)    • GERD (gastroesophageal reflux disease)    • Hx of CABG    • Hyperlipemia    • Hypertension    • Kidney stone    • Renal lesion      Social History     Socioeconomic History   • Marital status:       Spouse name: None   • Number of children: 2   • Years of education: None   • Highest education level: None   Occupational History   • None Tobacco Use   • Smoking status: Former     Packs/day: 1 00     Years: 60 00     Pack years: 60 00     Types: Cigarettes     Quit date:      Years since quittin 0   • Smokeless tobacco: Never   Vaping Use   • Vaping Use: Never used   Substance and Sexual Activity   • Alcohol use:  Yes     Alcohol/week: 1 0 standard drink     Types: 1 Shots of liquor per week     Comment: social shot once a week   • Drug use: No   • Sexual activity: Yes     Partners: Female   Other Topics Concern   • None   Social History Narrative    Caffeine use     Social Determinants of Health     Financial Resource Strain: Not on file   Food Insecurity: Not on file   Transportation Needs: Not on file   Physical Activity: Not on file   Stress: Not on file   Social Connections: Not on file   Intimate Partner Violence: Not on file   Housing Stability: Not on file     Social History     Tobacco Use   Smoking Status Former   • Packs/day: 1 00   • Years: 60 00   • Pack years: 60 00   • Types: Cigarettes   • Quit date:    • Years since quittin 0   Smokeless Tobacco Never     Family History   Problem Relation Age of Onset   • Nephrolithiasis Mother    • Heart attack Father    • Stroke Father        The following portions of the patient's history were reviewed and updated as appropriate: allergies, current medications, past medical history, past social history, past surgical history and problem list     Results  No results found for this or any previous visit (from the past 1 hour(s)) ]  Lab Results   Component Value Date    PSA 2 3 10/14/2021    PSA 2 4 12/10/2018     Lab Results   Component Value Date    GLUCOSE 102 2015    CALCIUM 8 9 2022     2015    K 4 4 2022    CO2 29 2022     2022    BUN 16 2022    CREATININE 0 91 2022     Lab Results   Component Value Date    WBC 7 54 2022    HGB 13 6 2022    HCT 40 5 2022     (H) 2022     06/28/2022       Amarilis Whitlock PA-C

## 2023-02-11 DIAGNOSIS — G89.29 CHRONIC PAIN OF RIGHT KNEE: Primary | ICD-10-CM

## 2023-02-11 DIAGNOSIS — M25.561 CHRONIC PAIN OF RIGHT KNEE: Primary | ICD-10-CM

## 2023-02-14 ENCOUNTER — OFFICE VISIT (OUTPATIENT)
Dept: OBGYN CLINIC | Facility: CLINIC | Age: 83
End: 2023-02-14

## 2023-02-14 ENCOUNTER — APPOINTMENT (OUTPATIENT)
Dept: RADIOLOGY | Facility: CLINIC | Age: 83
End: 2023-02-14

## 2023-02-14 VITALS
SYSTOLIC BLOOD PRESSURE: 132 MMHG | HEIGHT: 67 IN | HEART RATE: 73 BPM | WEIGHT: 151.3 LBS | BODY MASS INDEX: 23.75 KG/M2 | DIASTOLIC BLOOD PRESSURE: 72 MMHG

## 2023-02-14 DIAGNOSIS — M51.36 DDD (DEGENERATIVE DISC DISEASE), LUMBAR: Primary | ICD-10-CM

## 2023-02-14 DIAGNOSIS — G89.29 CHRONIC PAIN OF RIGHT KNEE: ICD-10-CM

## 2023-02-14 DIAGNOSIS — M25.551 PAIN IN RIGHT HIP: ICD-10-CM

## 2023-02-14 DIAGNOSIS — M25.561 CHRONIC PAIN OF RIGHT KNEE: ICD-10-CM

## 2023-02-14 DIAGNOSIS — M54.50 LOW BACK PAIN, UNSPECIFIED BACK PAIN LATERALITY, UNSPECIFIED CHRONICITY, UNSPECIFIED WHETHER SCIATICA PRESENT: ICD-10-CM

## 2023-02-14 DIAGNOSIS — M54.16 RADICULOPATHY, LUMBAR REGION: ICD-10-CM

## 2023-02-14 PROBLEM — M51.369 DDD (DEGENERATIVE DISC DISEASE), LUMBAR: Status: ACTIVE | Noted: 2023-02-14

## 2023-02-14 NOTE — PROGRESS NOTES
Orthopaedics Office Visit - New Patient Visit    ASSESSMENT/PLAN:    Assessment:   Lumbar radiculopathy   Lumbar DDD    Plan:   · X-rays were performed in the office and reviewed   · PT was ordered for ROM, stretching, strengthening and core strengthening exercises   · We discussed a referral to pain management for possible ECSI if symptoms worsen or fail to improve   · Follow up in the office as needed if symptoms worsen or fail to improve     To Do Next Visit:  Re-evaluation, referral to pain management     _____________________________________________________  CHIEF COMPLAINT:  Chief Complaint   Patient presents with   • Right Hip - Pain         SUBJECTIVE:  Geovani Harrison is a 79 y o  male who presents to the office for right hip pain  He notes pain to his lower back, which radiates to his right lateral hip and down his leg  He notes pain has been ongoing for a while, but recently started to worsen  He notes pain will worsen if he does not wear a back brace, which he did purchase OTC  He has a history of a right total hip arthroplasty aprox  10 years ago  He denies any groin pain  He underwent back injects maybe 10-12 years ago  He is taking a baby Aspirin daily after undergoing triple bypass       PAST MEDICAL HISTORY:  Past Medical History:   Diagnosis Date   • Benign prostatic hyperplasia    • CAD (coronary artery disease)    • Cervicogenic headache    • COPD (chronic obstructive pulmonary disease) (HCC)    • GERD (gastroesophageal reflux disease)    • Hx of CABG    • Hyperlipemia    • Hypertension    • Kidney stone    • Renal lesion        PAST SURGICAL HISTORY:  Past Surgical History:   Procedure Laterality Date   • CAROTID ENDARTERECTOMY Right    • COLONOSCOPY     • CORONARY ARTERY BYPASS GRAFT  2011   • HERNIA REPAIR      with mesh   • JOINT REPLACEMENT Right    • LITHOTRIPSY     • ID CYSTO INSERTION TRANSPROSTATIC IMPLANT SINGLE N/A 5/27/2021    Procedure: CYSTOSCOPY WITH INSERTION Naya Generous;  Surgeon: Gaby Correa MD;  Location: [facility];  Service: Urology   • TOTAL HIP ARTHROPLASTY Right        FAMILY HISTORY:  Family History   Problem Relation Age of Onset   • Nephrolithiasis Mother    • Heart attack Father    • Stroke Father        SOCIAL HISTORY:  Social History     Tobacco Use   • Smoking status: Former     Packs/day: 1 00     Years: 60 00     Pack years: 60 00     Types: Cigarettes     Quit date:      Years since quittin 1   • Smokeless tobacco: Never   Vaping Use   • Vaping Use: Never used   Substance Use Topics   • Alcohol use:  Yes     Alcohol/week: 1 0 standard drink     Types: 1 Shots of liquor per week     Comment: social shot once a week   • Drug use: No       MEDICATIONS:    Current Outpatient Medications:   •  albuterol (2 5 mg/3 mL) 0 083 % nebulizer solution, Take 6 mL (5 mg total) by nebulization every 6 (six) hours as needed for wheezing, Disp: 75 mL, Rfl: 0  •  albuterol (PROVENTIL HFA,VENTOLIN HFA) 90 mcg/act inhaler, Inhale 2 puffs 2 (two) times a day as needed for wheezing, Disp: 18 g, Rfl: 5  •  Ascorbic Acid (VITAMIN C) 100 MG tablet, Take 1 tablet by mouth daily, Disp: , Rfl:   •  aspirin (ECOTRIN LOW STRENGTH) 81 mg EC tablet, Take 81 mg by mouth daily, Disp: , Rfl:   •  azelastine (ASTELIN) 0 1 % nasal spray, 1-2 sprays into each nostril 2 (two) times a day (Patient taking differently: 1-2 sprays into each nostril daily), Disp: 30 mL, Rfl: 5  •  B Complex Vitamins (VITAMIN B COMPLEX PO), Take 1 tablet by mouth daily, Disp: , Rfl:   •  Cholecalciferol 50 MCG (2000 UT) CAPS, Take 1 capsule by mouth, Disp: , Rfl:   •  fluticasone (FLONASE) 50 mcg/act nasal spray, 2 sprays into each nostril daily (Patient taking differently: 2 sprays into each nostril as needed), Disp: 16 g, Rfl: 5  •  methylPREDNISolone 4 MG tablet therapy pack, Use as directed on package, Disp: 21 tablet, Rfl: 0  •  metoprolol succinate (TOPROL-XL) 50 mg 24 hr tablet, Take 1 tablet (50 mg total) by mouth daily, Disp: 90 tablet, Rfl: 2  •  montelukast (SINGULAIR) 10 mg tablet, TAKE 1 TABLET BY MOUTH EVERY DAY AT NIGHT, Disp: , Rfl:   •  nitroglycerin (NITROSTAT) 0 4 mg SL tablet, Place 0 4 mg under the tongue as needed, Disp: , Rfl:   •  pantoprazole (PROTONIX) 40 mg tablet, Take 1 tablet (40 mg total) by mouth daily, Disp: 90 tablet, Rfl: 3  •  predniSONE 10 mg tablet, , Disp: , Rfl:   •  promethazine-dextromethorphan (PHENERGAN-DM) 6 25-15 mg/5 mL oral syrup, TAKE 5 MILLILITERS BY MOUTH 4 TIMES A DAY AS NEEDED FOR COUGH, Disp: , Rfl:   •  simvastatin (ZOCOR) 40 mg tablet, Take 1 tablet (40 mg total) by mouth daily at bedtime, Disp: 90 tablet, Rfl: 3  •  tamsulosin (FLOMAX) 0 4 mg, Take 1 capsule (0 4 mg total) by mouth daily, Disp: 90 capsule, Rfl: 3    ALLERGIES:  Allergies   Allergen Reactions   • Prednisone Other (See Comments)     Constipation         REVIEW OF SYSTEMS:  MSK: as noted in HPI  Neuro: WNL  Pertinent items are otherwise noted in HPI  A comprehensive review of systems was otherwise negative  LABS:  HgA1c:   Lab Results   Component Value Date    HGBA1C 6 1 04/25/2019     BMP:   Lab Results   Component Value Date    GLUCOSE 102 08/07/2015    CALCIUM 8 9 06/28/2022     08/07/2015    K 4 4 06/28/2022    CO2 29 06/28/2022     06/28/2022    BUN 16 06/28/2022    CREATININE 0 91 06/28/2022     CBC: No components found for: CBC    _____________________________________________________  PHYSICAL EXAMINATION:  Vital signs: /72   Pulse 73   Ht 5' 7" (1 702 m)   Wt 68 6 kg (151 lb 4 8 oz)   BMI 23 70 kg/m²   General: No acute distress, awake and alert  Psychiatric: Mood and affect appear appropriate  HEENT: Trachea Midline, No torticollis, no apparent facial trauma  Cardiovascular: No audible murmurs;  Extremities appear perfused  Pulmonary: No audible wheezing or stridor  Skin: No open lesions; see further details (if any) below    MUSCULOSKELETAL EXAMINATION:    Extremities: Right hip     No erythema, ecchymosis or edema  No pain with internal or external rotation   No pain with hip flexion   Hip flexion strength 5/5  Well healed surgical incision   - straight leg raise   Extremity appears warm and well perfused   Ambulates without assistance     _____________________________________________________  STUDIES REVIEWED:  I personally reviewed the images and interpretation is as follows:    X-rays of right hip demonstrate no acute fracture or dislocation  S/p right total hip arthroplasty with hardware in good alignment and position  No signs of loosening or failure  X-rays lumbar spine demonstrate no acute fracture or dislocation  Degenerative changes L5-S1         PROCEDURES PERFORMED:  Procedures    Scribe Attestation    I,:  Francisco Mortensen am acting as a scribe while in the presence of the attending physician :       I,:  Chi Puentes MD personally performed the services described in this documentation    as scribed in my presence :

## 2023-03-06 NOTE — PROGRESS NOTES
3/7/2023      Chief Complaint   Patient presents with   • Benign Prostatic Hypertrophy         Assessment and Plan    79 y o  male     1  BPH with lower urinary tract symptoms status post UroLift (5/27/2021)  - AUA today 25  - Continue Flomax  - Start Vesicare  - Follow up in 6 weeks for symptom reassessment  - Call with any questions or concerns in the meantime  - All questions answered; patient understands and agrees with plan       History of Present Illness  Ramu Carlson is a 79 y o  male patient with history of BPH with LUTS s/p Urolift (5/27/21) here for follow up  Patient was doing well after UroLift procedure, however developed worsening of symptoms after stopping his Flomax  Patient was instructed to reinitiate Flomax and has been doing well since with flow, however, continues with frequency and urgency  Denies side effects of medication  AUA SYMPTOM SCORE    Flowsheet Row Most Recent Value   AUA SYMPTOM SCORE    How often have you had a sensation of not emptying your bladder completely after you finished urinating? 3   How often have you had to urinate again less than two hours after you finished urinating? 4   How often have you found you stopped and started again several times when you urinate? 5   How often have you found it difficult to postpone urination? 3   How often have you had a weak urinary stream? 3   How often have you had to push or strain to begin urination? 2   How many times did you most typically get up to urinate from the time you went to bed at night until the time you got up in the morning? 5   Quality of Life: If you were to spend the rest of your life with your urinary condition just the way it is now, how would you feel about that? 5   AUA SYMPTOM SCORE 25          Review of Systems   Constitutional: Negative for activity change, appetite change, chills and fever  HENT: Negative for congestion and trouble swallowing      Respiratory: Negative for cough and shortness of breath  Cardiovascular: Negative for chest pain, palpitations and leg swelling  Gastrointestinal: Negative for abdominal pain, constipation, diarrhea, nausea and vomiting  Genitourinary: Positive for frequency and urgency  Negative for difficulty urinating, dysuria, flank pain and hematuria  Musculoskeletal: Negative for back pain and gait problem  Skin: Negative for wound  Allergic/Immunologic: Negative for immunocompromised state  Neurological: Negative for dizziness and syncope  Hematological: Does not bruise/bleed easily  Psychiatric/Behavioral: Negative for confusion  All other systems reviewed and are negative  Vitals  Vitals:    03/07/23 1335   BP: 142/80   Pulse: 85   SpO2: 98%   Weight: 68 9 kg (152 lb)   Height: 5' 7" (1 702 m)       Physical Exam  Constitutional:       General: He is not in acute distress  Appearance: Normal appearance  He is not ill-appearing, toxic-appearing or diaphoretic  HENT:      Head: Normocephalic  Nose: No congestion  Eyes:      General: No scleral icterus  Right eye: No discharge  Left eye: No discharge  Conjunctiva/sclera: Conjunctivae normal       Pupils: Pupils are equal, round, and reactive to light  Pulmonary:      Effort: Pulmonary effort is normal    Musculoskeletal:      Cervical back: Normal range of motion  Skin:     General: Skin is warm and dry  Coloration: Skin is not jaundiced or pale  Findings: No bruising, erythema, lesion or rash  Neurological:      General: No focal deficit present  Mental Status: He is alert and oriented to person, place, and time  Mental status is at baseline  Gait: Gait normal    Psychiatric:         Mood and Affect: Mood normal          Behavior: Behavior normal          Thought Content:  Thought content normal          Judgment: Judgment normal            Past History  Past Medical History:   Diagnosis Date   • Benign prostatic hyperplasia    • CAD (coronary artery disease)    • Cervicogenic headache    • COPD (chronic obstructive pulmonary disease) (HCC)    • GERD (gastroesophageal reflux disease)    • Hx of CABG    • Hyperlipemia    • Hypertension    • Kidney stone    • Renal lesion      Social History     Socioeconomic History   • Marital status:      Spouse name: None   • Number of children: 2   • Years of education: None   • Highest education level: None   Occupational History   • None   Tobacco Use   • Smoking status: Former     Packs/day: 1 00     Years: 60 00     Pack years: 60 00     Types: Cigarettes     Quit date:      Years since quittin 1     Passive exposure: Current   • Smokeless tobacco: Never   Vaping Use   • Vaping Use: Never used   Substance and Sexual Activity   • Alcohol use:  Yes     Alcohol/week: 1 0 standard drink     Types: 1 Shots of liquor per week     Comment: social shot once a week   • Drug use: No   • Sexual activity: Yes     Partners: Female   Other Topics Concern   • None   Social History Narrative    Caffeine use     Social Determinants of Health     Financial Resource Strain: Not on file   Food Insecurity: Not on file   Transportation Needs: Not on file   Physical Activity: Not on file   Stress: Not on file   Social Connections: Not on file   Intimate Partner Violence: Not on file   Housing Stability: Not on file     Social History     Tobacco Use   Smoking Status Former   • Packs/day: 1 00   • Years: 60 00   • Pack years: 60 00   • Types: Cigarettes   • Quit date:    • Years since quittin    • Passive exposure: Current   Smokeless Tobacco Never     Family History   Problem Relation Age of Onset   • Nephrolithiasis Mother    • Heart attack Father    • Stroke Father        The following portions of the patient's history were reviewed and updated as appropriate: allergies, current medications, past medical history, past social history, past surgical history and problem list     Results  Recent Results (from the past 1 hour(s))   POCT Measure PVR    Collection Time: 03/07/23  1:54 PM   Result Value Ref Range    POST-VOID RESIDUAL VOLUME, ML POC 11 mL   ]  Lab Results   Component Value Date    PSA 2 3 10/14/2021    PSA 2 4 12/10/2018     Lab Results   Component Value Date    GLUCOSE 102 08/07/2015    CALCIUM 8 9 06/28/2022     08/07/2015    K 4 4 06/28/2022    CO2 29 06/28/2022     06/28/2022    BUN 16 06/28/2022    CREATININE 0 91 06/28/2022     Lab Results   Component Value Date    WBC 7 54 06/28/2022    HGB 13 6 06/28/2022    HCT 40 5 06/28/2022     (H) 06/28/2022     06/28/2022       Tamar Severe, PA-C

## 2023-03-07 ENCOUNTER — OFFICE VISIT (OUTPATIENT)
Dept: UROLOGY | Facility: CLINIC | Age: 83
End: 2023-03-07

## 2023-03-07 VITALS
WEIGHT: 152 LBS | OXYGEN SATURATION: 98 % | SYSTOLIC BLOOD PRESSURE: 142 MMHG | HEART RATE: 85 BPM | DIASTOLIC BLOOD PRESSURE: 80 MMHG | HEIGHT: 67 IN | BODY MASS INDEX: 23.86 KG/M2

## 2023-03-07 DIAGNOSIS — N40.0 BENIGN PROSTATIC HYPERPLASIA WITHOUT LOWER URINARY TRACT SYMPTOMS: Primary | ICD-10-CM

## 2023-03-07 DIAGNOSIS — R35.0 URINARY FREQUENCY: Primary | ICD-10-CM

## 2023-03-07 DIAGNOSIS — R35.0 URINARY FREQUENCY: ICD-10-CM

## 2023-03-07 LAB — POST-VOID RESIDUAL VOLUME, ML POC: 11 ML

## 2023-03-07 RX ORDER — SOLIFENACIN SUCCINATE 10 MG/1
10 TABLET, FILM COATED ORAL DAILY
Qty: 90 TABLET | Refills: 3 | Status: SHIPPED | OUTPATIENT
Start: 2023-03-07 | End: 2023-03-07

## 2023-03-07 RX ORDER — TROSPIUM CHLORIDE 20 MG/1
20 TABLET, FILM COATED ORAL 2 TIMES DAILY
Qty: 90 TABLET | Refills: 3 | Status: SHIPPED | OUTPATIENT
Start: 2023-03-07 | End: 2023-04-13

## 2023-05-11 ENCOUNTER — APPOINTMENT (OUTPATIENT)
Age: 83
End: 2023-05-11

## 2023-05-11 ENCOUNTER — OFFICE VISIT (OUTPATIENT)
Age: 83
End: 2023-05-11

## 2023-05-11 VITALS
BODY MASS INDEX: 23.86 KG/M2 | SYSTOLIC BLOOD PRESSURE: 140 MMHG | DIASTOLIC BLOOD PRESSURE: 68 MMHG | HEART RATE: 78 BPM | WEIGHT: 152 LBS | RESPIRATION RATE: 20 BRPM | TEMPERATURE: 95.9 F | OXYGEN SATURATION: 98 % | HEIGHT: 67 IN

## 2023-05-11 DIAGNOSIS — Z00.00 MEDICARE ANNUAL WELLNESS VISIT, SUBSEQUENT: ICD-10-CM

## 2023-05-11 DIAGNOSIS — E78.2 MIXED HYPERLIPIDEMIA: Chronic | ICD-10-CM

## 2023-05-11 DIAGNOSIS — I71.43 INFRARENAL ABDOMINAL AORTIC ANEURYSM (AAA) WITHOUT RUPTURE (HCC): ICD-10-CM

## 2023-05-11 DIAGNOSIS — R73.01 IMPAIRED FASTING GLUCOSE: Chronic | ICD-10-CM

## 2023-05-11 DIAGNOSIS — I10 ESSENTIAL HYPERTENSION: Chronic | ICD-10-CM

## 2023-05-11 DIAGNOSIS — J43.9 PULMONARY EMPHYSEMA, UNSPECIFIED EMPHYSEMA TYPE (HCC): Primary | ICD-10-CM

## 2023-05-11 PROBLEM — R42 DIZZINESS: Status: RESOLVED | Noted: 2021-10-13 | Resolved: 2023-05-11

## 2023-05-11 NOTE — PROGRESS NOTES
Assessment and Plan:     1  Pulmonary emphysema, unspecified emphysema type ([address] )    Stable at this time  Continue albuterol prn  Still smoking unfortunately  2  Infrarenal abdominal aortic aneurysm (AAA) without rupture ([facility] [address] )    CT scan from 10/2022 at [facility] showed stable AAA at 3 0 cm  Tobacco cessation advised  3  Impaired fasting glucose    Check updated A1c  Watch carb intake  - Hemoglobin A1C; Future    4  Mixed hyperlipidemia    Check updated lipids  Continue statin  - CBC; Future  - Comprehensive metabolic panel; Future  - Lipid Panel with Direct LDL reflex; Future    5  Essential hypertension    Stable and acceptable for his age  Will monitor on toprol-XL  Depression Screening and Follow-up Plan: Patient was screened for depression during today's encounter  They screened negative with a PHQ-2 score of 0  Preventive health issues were discussed with patient, and age appropriate screening tests were ordered as noted in patient's After Visit Summary  Personalized health advice and appropriate referrals for health education or preventive services given if needed, as noted in patient's After Visit Summary  History of Present Illness:     Patient presents for a Medicare Wellness Visit    Patient presents for follow-up  He is doing well  No big changes with his health recently  Denies any current cardiac symptoms  Due for updated lab work  Patient Care Team:  Jossy Irwin DO as PCP - General (Internal Medicine)  Jessica Heck MD     Review of Systems:     Review of Systems   Constitutional: Negative for activity change, appetite change and fatigue  Respiratory: Negative for apnea, cough, chest tightness, shortness of breath and wheezing  Cardiovascular: Negative for chest pain, palpitations and leg swelling  Gastrointestinal: Negative for abdominal distention, abdominal pain, blood in stool, constipation, diarrhea, nausea and vomiting     Neurological: Negative for dizziness, weakness, light-headedness, numbness and headaches  Psychiatric/Behavioral: Negative for behavioral problems, confusion, hallucinations, sleep disturbance and suicidal ideas  The patient is not nervous/anxious  Problem List:     Patient Active Problem List   Diagnosis   • Hyperlipidemia   • Chronic nonintractable headache   • Pulmonary emphysema (HCC)   • Benign prostatic hyperplasia without lower urinary tract symptoms   • Carotid artery stenosis   • Hx of CABG   • Atherosclerosis of coronary artery   • Essential hypertension   • Cataract   • Cervical radiculopathy   • Chronic sphenoidal sinusitis   • Esophageal reflux   • Foraminal stenosis of thoracic region   • Impaired fasting glucose   • Osteoarthritis of hip   • Abdominal aortic aneurysm without rupture (HCC)   • Dizziness   • VARGHESE (obstructive sleep apnea)   • DDD (degenerative disc disease), lumbar      Past Medical and Surgical History:     Past Medical History:   Diagnosis Date   • Benign prostatic hyperplasia    • CAD (coronary artery disease)    • Cervicogenic headache    • COPD (chronic obstructive pulmonary disease) ([facility])    • GERD (gastroesophageal reflux disease)    • Hx of CABG    • Hyperlipemia    • Hypertension    • Kidney stone    • Renal lesion      Past Surgical History:   Procedure Laterality Date   • CAROTID ENDARTERECTOMY Right    • COLONOSCOPY     • CORONARY ARTERY BYPASS GRAFT  2011   • HERNIA REPAIR      with mesh   • JOINT REPLACEMENT Right    • LITHOTRIPSY     • PA CYSTO INSERTION TRANSPROSTATIC IMPLANT SINGLE N/A 5/27/2021    Procedure: CYSTOSCOPY WITH INSERTION Nadia Barrera;  Surgeon: Sharlene Yeboah MD;  Location: [facility];  Service: Urology   • TOTAL HIP ARTHROPLASTY Right 2015      Family History:     Family History   Problem Relation Age of Onset   • Nephrolithiasis Mother    • Heart attack Father    • Stroke Father       Social History:     Social History     Socioeconomic History   • Marital status:   Spouse name: None   • Number of children: 2   • Years of education: None   • Highest education level: None   Occupational History   • None   Tobacco Use   • Smoking status: Former     Packs/day: 1 00     Years: 60 00     Pack years: 60 00     Types: Cigarettes     Quit date:      Years since quittin 3     Passive exposure: Current   • Smokeless tobacco: Never   Vaping Use   • Vaping Use: Never used   Substance and Sexual Activity   • Alcohol use:  Yes     Alcohol/week: 1 0 standard drink     Types: 1 Shots of liquor per week     Comment: social shot once a week   • Drug use: No   • Sexual activity: Yes     Partners: Female   Other Topics Concern   • None   Social History Narrative    Caffeine use     Social Determinants of Health     Financial Resource Strain: Not on file   Food Insecurity: Not on file   Transportation Needs: Not on file   Physical Activity: Not on file   Stress: Not on file   Social Connections: Not on file   Intimate Partner Violence: Not on file   Housing Stability: Not on file      Medications and Allergies:     Current Outpatient Medications   Medication Sig Dispense Refill   • albuterol (2 5 mg/3 mL) 0 083 % nebulizer solution Take 6 mL (5 mg total) by nebulization every 6 (six) hours as needed for wheezing 75 mL 0   • albuterol (PROVENTIL HFA,VENTOLIN HFA) 90 mcg/act inhaler Inhale 2 puffs 2 (two) times a day as needed for wheezing 18 g 5   • Ascorbic Acid (VITAMIN C) 100 MG tablet Take 1 tablet by mouth daily     • aspirin (ECOTRIN LOW STRENGTH) 81 mg EC tablet Take 81 mg by mouth daily     • azelastine (ASTELIN) 0 1 % nasal spray 1-2 sprays into each nostril 2 (two) times a day (Patient not taking: Reported on 3/7/2023) 30 mL 5   • B Complex Vitamins (VITAMIN B COMPLEX PO) Take 1 tablet by mouth daily     • Cholecalciferol 50 MCG (2000 UT) CAPS Take 1 capsule by mouth     • fluticasone (FLONASE) 50 mcg/act nasal spray 2 sprays into each nostril daily (Patient not taking: Reported on 3/7/2023) 16 g 5   • methylPREDNISolone 4 MG tablet therapy pack Use as directed on package (Patient not taking: Reported on 3/7/2023) 21 tablet 0   • metoprolol succinate (TOPROL-XL) 50 mg 24 hr tablet Take 1 tablet (50 mg total) by mouth daily 90 tablet 2   • montelukast (SINGULAIR) 10 mg tablet TAKE 1 TABLET BY MOUTH EVERY DAY AT NIGHT     • nitroglycerin (NITROSTAT) 0 4 mg SL tablet Place 0 4 mg under the tongue as needed (Patient not taking: Reported on 3/7/2023)     • pantoprazole (PROTONIX) 40 mg tablet Take 1 tablet (40 mg total) by mouth daily (Patient not taking: Reported on 3/7/2023) 90 tablet 3   • predniSONE 10 mg tablet  (Patient not taking: Reported on 3/7/2023)     • promethazine-dextromethorphan (PHENERGAN-DM) 6 25-15 mg/5 mL oral syrup TAKE 5 MILLILITERS BY MOUTH 4 TIMES A DAY AS NEEDED FOR COUGH (Patient not taking: Reported on 3/7/2023)     • simvastatin (ZOCOR) 40 mg tablet Take 1 tablet (40 mg total) by mouth daily at bedtime 90 tablet 3   • tamsulosin (FLOMAX) 0 4 mg Take 1 capsule (0 4 mg total) by mouth daily 90 capsule 3   • trospium chloride (SANCTURA) 20 mg tablet TAKE 1 TABLET BY MOUTH TWICE A  tablet 2     No current facility-administered medications for this visit       Allergies   Allergen Reactions   • Prednisone Other (See Comments)     Constipation        Immunizations:     Immunization History   Administered Date(s) Administered   • COVID-19 MODERNA VACC 0 5 ML IM 01/22/2021, 02/26/2021   • H1N1, All Formulations 12/31/2009   • INFLUENZA 11/09/2006, 11/08/2007, 01/05/2009, 10/24/2016, 11/14/2017, 12/30/2018, 09/01/2021   • Influenza Split High Dose Preservative Free IM 01/08/2015, 11/14/2017   • Influenza, high dose seasonal 0 7 mL 10/31/2019, 10/05/2020   • Influenza, seasonal, injectable 10/21/2003, 11/09/2006, 11/08/2007, 01/05/2009, 10/21/2010, 10/24/2016, 09/01/2021   • Pneumococcal Conjugate 13-Valent 10/25/2015, 03/07/2016, 10/05/2020   • Pneumococcal Polysaccharide PPV23 10/21/2003, 04/30/2019   • Tdap 04/24/2014      Health Maintenance: There are no preventive care reminders to display for this patient  Topic Date Due   • COVID-19 Vaccine (3 - Booster for Moderna series) 04/23/2021      Medicare Screening Tests and Risk Assessments: Evangelina Theodore is here for his Subsequent Wellness visit  Last Medicare Wellness visit information reviewed, patient interviewed and updates made to the record today  Health Risk Assessment:   Patient rates overall health as good  Patient feels that their physical health rating is same  Patient is satisfied with their life  Eyesight was rated as slightly worse  Hearing was rated as same  Patient feels that their emotional and mental health rating is same  Patients states they are never, rarely angry  Patient states they are sometimes unusually tired/fatigued  Pain experienced in the last 7 days has been none  Patient states that he has experienced no weight loss or gain in last 6 months  Depression Screening:   PHQ-2 Score: 0      Fall Risk Screening: In the past year, patient has experienced: no history of falling in past year      Home Safety:  Patient does not have trouble with stairs inside or outside of their home  Patient has no working smoke alarms and has working carbon monoxide detector  Home safety hazards include: none  Nutrition:   Current diet is Regular  Medications:   Patient is currently taking over-the-counter supplements  OTC medications include: see medication list  Patient is able to manage medications  Activities of Daily Living (ADLs)/Instrumental Activities of Daily Living (IADLs):   Walk and transfer into and out of bed and chair?: Yes  Dress and groom yourself?: Yes    Bathe or shower yourself?: Yes    Feed yourself?  Yes  Do your laundry/housekeeping?: Yes  Manage your money, pay your bills and track your expenses?: Yes  Make your own meals?: Yes    Do your own shopping?: Yes    Durable Medical Equipment Suppliers  none    Previous Hospitalizations:   Any hospitalizations or ED visits within the last 12 months?: No    How many hospitalizations have you had in the last year?: 1-2    Advance Care Planning:   Living will: No    Durable POA for healthcare: Yes    Advanced directive: No    Five wishes given: No      Comments: Daughter is POA    Cognitive Screening:   Provider or family/friend/caregiver concerned regarding cognition?: No    PREVENTIVE SCREENINGS      Cardiovascular Screening:    General: Screening Not Indicated and History Lipid Disorder      Diabetes Screening:     General: Screening Current      Colorectal Cancer Screening:     General: Screening Not Indicated      Prostate Cancer Screening:    General: Screening Not Indicated      Osteoporosis Screening:    General: Screening Not Indicated      Abdominal Aortic Aneurysm (AAA) Screening:    Risk factors include: tobacco use        General: Screening Not Indicated and History AAA      Lung Cancer Screening:     General: Screening Not Indicated      Hepatitis C Screening:    General: Screening Not Indicated    Screening, Brief Intervention, and Referral to Treatment (SBIRT)    Screening  Typical number of drinks in a day: 0  Typical number of drinks in a week: 0  Interpretation: Low risk drinking behavior      AUDIT-C Screenin) How often did you have a drink containing alcohol in the past year? never  2) How many drinks did you have on a typical day when you were drinking in the past year? 0  3) How often did you have 6 or more drinks on one occasion in the past year? never    AUDIT-C Score: 0  Interpretation: Score 0-3 (male): Negative screen for alcohol misuse    Single Item Drug Screening:  How often have you used an illegal drug (including marijuana) or a prescription medication for non-medical reasons in the past year? never    Single Item Drug Screen Score: 0  Interpretation: Negative screen for possible drug use "disorder    Brief Intervention  Alcohol & drug use screenings were reviewed  No concerns regarding substance use disorder identified  Other Counseling Topics:   Car/seat belt/driving safety, skin self-exam, sunscreen and regular weightbearing exercise  Physical Exam:     /68 (BP Location: Left arm, Patient Position: Sitting, Cuff Size: Standard)   Pulse 78   Temp (!) 95 9 °F (35 5 °C) (Tympanic)   Resp 20   Ht 5' 7\" (1 702 m)   Wt 68 9 kg (152 lb)   SpO2 98%   BMI 23 81 kg/m²     Physical Exam  Constitutional:       General: He is not in acute distress  Appearance: He is not ill-appearing  Cardiovascular:      Rate and Rhythm: Normal rate and regular rhythm  Heart sounds: No murmur heard  Pulmonary:      Effort: Pulmonary effort is normal  No respiratory distress  Breath sounds: No wheezing  Abdominal:      General: Bowel sounds are normal  There is no distension  Tenderness: There is no abdominal tenderness  Musculoskeletal:      Right lower leg: No edema  Left lower leg: No edema  Neurological:      Mental Status: He is alert            Eugenio Nothstein, DO  "

## 2023-05-11 NOTE — PATIENT INSTRUCTIONS
Medicare Preventive Visit Patient Instructions  Thank you for completing your Welcome to Medicare Visit or Medicare Annual Wellness Visit today  Your next wellness visit will be due in one year (5/11/2024)  The screening/preventive services that you may require over the next 5-10 years are detailed below  Some tests may not apply to you based off risk factors and/or age  Screening tests ordered at today's visit but not completed yet may show as past due  Also, please note that scanned in results may not display below  Preventive Screenings:  Service Recommendations Previous Testing/Comments   Colorectal Cancer Screening  Colonoscopy    Fecal Occult Blood Test (FOBT)/Fecal Immunochemical Test (FIT)  Fecal DNA/Cologuard Test  Flexible Sigmoidoscopy Age: 39-70 years old   Colonoscopy: every 10 years (May be performed more frequently if at higher risk)  OR  FOBT/FIT: every 1 year  OR  Cologuard: every 3 years  OR  Sigmoidoscopy: every 5 years  Screening may be recommended earlier than age 39 if at higher risk for colorectal cancer  Also, an individualized decision between you and your healthcare provider will decide whether screening between the ages of 74-80 would be appropriate   Colonoscopy: Not on file  FOBT/FIT: Not on file  Cologuard: Not on file  Sigmoidoscopy: Not on file    Screening Not Indicated     Prostate Cancer Screening Individualized decision between patient and health care provider in men between ages of 53-78   Medicare will cover every 12 months beginning on the day after your 50th birthday PSA: 2 3 ng/mL     Screening Not Indicated     Hepatitis C Screening Once for adults born between 1945 and 1965  More frequently in patients at high risk for Hepatitis C Hep C Antibody: Not on file    Screening Not Indicated   Diabetes Screening 1-2 times per year if you're at risk for diabetes or have pre-diabetes Fasting glucose: 105 mg/dL (10/14/2021)  A1C: 6 1 % (4/25/2019)  Screening Current   Cholesterol Screening Once every 5 years if you don't have a lipid disorder  May order more often based on risk factors  Lipid panel: 10/14/2021  Screening Not Indicated  History Lipid Disorder      Other Preventive Screenings Covered by Medicare:  Abdominal Aortic Aneurysm (AAA) Screening: covered once if your at risk  You're considered to be at risk if you have a family history of AAA or a male between the age of 73-68 who smoking at least 100 cigarettes in your lifetime  Lung Cancer Screening: covers low dose CT scan once per year if you meet all of the following conditions: (1) Age 50-69; (2) No signs or symptoms of lung cancer; (3) Current smoker or have quit smoking within the last 15 years; (4) You have a tobacco smoking history of at least 20 pack years (packs per day x number of years you smoked); (5) You get a written order from a healthcare provider  Glaucoma Screening: covered annually if you're considered high risk: (1) You have diabetes OR (2) Family history of glaucoma OR (3)  aged 48 and older OR (3)  American aged 72 and older  Osteoporosis Screening: covered every 2 years if you meet one of the following conditions: (1) Have a vertebral abnormality; (2) On glucocorticoid therapy for more than 3 months; (3) Have primary hyperparathyroidism; (4) On osteoporosis medications and need to assess response to drug therapy  HIV Screening: covered annually if you're between the age of 12-76  Also covered annually if you are younger than 13 and older than 72 with risk factors for HIV infection  For pregnant patients, it is covered up to 3 times per pregnancy      Immunizations:  Immunization Recommendations   Influenza Vaccine Annual influenza vaccination during flu season is recommended for all persons aged >= 6 months who do not have contraindications   Pneumococcal Vaccine   * Pneumococcal conjugate vaccine = PCV13 (Prevnar 13), PCV15 (Vaxneuvance), PCV20 (Prevnar 20)  * Pneumococcal polysaccharide vaccine = PPSV23 (Pneumovax) Adults 2364 years old: 1-3 doses may be recommended based on certain risk factors  Adults 72 years old: 1-2 doses may be recommended based off what pneumonia vaccine you previously received   Hepatitis B Vaccine 3 dose series if at intermediate or high risk (ex: diabetes, end stage renal disease, liver disease)   Tetanus (Td) Vaccine - COST NOT COVERED BY MEDICARE PART B Following completion of primary series, a booster dose should be given every 10 years to maintain immunity against tetanus  Td may also be given as tetanus wound prophylaxis  Tdap Vaccine - COST NOT COVERED BY MEDICARE PART B Recommended at least once for all adults  For pregnant patients, recommended with each pregnancy  Shingles Vaccine (Shingrix) - COST NOT COVERED BY MEDICARE PART B  2 shot series recommended in those aged 48 and above     Health Maintenance Due:  There are no preventive care reminders to display for this patient  Immunizations Due:      Topic Date Due    COVID-19 Vaccine (3 - Booster for Moderna series) 04/23/2021     Advance Directives   What are advance directives? Advance directives are legal documents that state your wishes and plans for medical care  These plans are made ahead of time in case you lose your ability to make decisions for yourself  Advance directives can apply to any medical decision, such as the treatments you want, and if you want to donate organs  What are the types of advance directives? There are many types of advance directives, and each state has rules about how to use them  You may choose a combination of any of the following:  Living will: This is a written record of the treatment you want  You can also choose which treatments you do not want, which to limit, and which to stop at a certain time  This includes surgery, medicine, IV fluid, and tube feedings  Durable power of  for healthcare [place] [facility]):   This is a written record that states who you want to make healthcare choices for you when you are unable to make them for yourself  This person, called a proxy, is usually a family member or a friend  You may choose more than 1 proxy  Do not resuscitate (DNR) order:  A DNR order is used in case your heart stops beating or you stop breathing  It is a request not to have certain forms of treatment, such as CPR  A DNR order may be included in other types of advance directives  Medical directive: This covers the care that you want if you are in a coma, near death, or unable to make decisions for yourself  You can list the treatments you want for each condition  Treatment may include pain medicine, surgery, blood transfusions, dialysis, IV or tube feedings, and a ventilator (breathing machine)  Values history: This document has questions about your views, beliefs, and how you feel and think about life  This information can help others choose the care that you would choose  Why are advance directives important? An advance directive helps you control your care  Although spoken wishes may be used, it is better to have your wishes written down  Spoken wishes can be misunderstood, or not followed  Treatments may be given even if you do not want them  An advance directive may make it easier for your family to make difficult choices about your care  © Copyright [employer] 2018 Information is for End User's use only and may not be sold, redistributed or otherwise used for commercial purposes   All illustrations and images included in CareNotes® are the copyrighted property of [employer]  or [address] [place] [address] [employer]

## 2023-05-12 ENCOUNTER — TELEPHONE (OUTPATIENT)
Age: 83
End: 2023-05-12

## 2023-05-12 LAB
ALBUMIN SERPL BCP-MCNC: 3.8 G/DL (ref 3.5–5)
ALP SERPL-CCNC: 82 U/L (ref 46–116)
ALT SERPL W P-5'-P-CCNC: 20 U/L (ref 12–78)
ANION GAP SERPL CALCULATED.3IONS-SCNC: 3 MMOL/L (ref 4–13)
AST SERPL W P-5'-P-CCNC: 24 U/L (ref 5–45)
BILIRUB SERPL-MCNC: 0.37 MG/DL (ref 0.2–1)
BUN SERPL-MCNC: 23 MG/DL (ref 5–25)
CALCIUM SERPL-MCNC: 9 MG/DL (ref 8.3–10.1)
CHLORIDE SERPL-SCNC: 109 MMOL/L (ref 96–108)
CHOLEST SERPL-MCNC: 104 MG/DL
CO2 SERPL-SCNC: 26 MMOL/L (ref 21–32)
CREAT SERPL-MCNC: 1.03 MG/DL (ref 0.6–1.3)
ERYTHROCYTE [DISTWIDTH] IN BLOOD BY AUTOMATED COUNT: 12.8 % (ref 11.6–15.1)
EST. AVERAGE GLUCOSE BLD GHB EST-MCNC: 123 MG/DL
GFR SERPL CREATININE-BSD FRML MDRD: 66 ML/MIN/1.73SQ M
GLUCOSE P FAST SERPL-MCNC: 75 MG/DL (ref 65–99)
HBA1C MFR BLD: 5.9 %
HCT VFR BLD AUTO: 45.3 % (ref 36.5–49.3)
HDLC SERPL-MCNC: 38 MG/DL
HGB BLD-MCNC: 14.7 G/DL (ref 12–17)
LDLC SERPL CALC-MCNC: 45 MG/DL (ref 0–100)
MCH RBC QN AUTO: 33.6 PG (ref 26.8–34.3)
MCHC RBC AUTO-ENTMCNC: 32.5 G/DL (ref 31.4–37.4)
MCV RBC AUTO: 103 FL (ref 82–98)
PLATELET # BLD AUTO: 211 THOUSANDS/UL (ref 149–390)
PMV BLD AUTO: 10.3 FL (ref 8.9–12.7)
POTASSIUM SERPL-SCNC: 4.3 MMOL/L (ref 3.5–5.3)
PROT SERPL-MCNC: 7.1 G/DL (ref 6.4–8.4)
RBC # BLD AUTO: 4.38 MILLION/UL (ref 3.88–5.62)
SODIUM SERPL-SCNC: 138 MMOL/L (ref 135–147)
TRIGL SERPL-MCNC: 107 MG/DL
WBC # BLD AUTO: 7.81 THOUSAND/UL (ref 4.31–10.16)

## 2023-05-12 NOTE — TELEPHONE ENCOUNTER
----- Message from Suzie Alex DO sent at 5/12/2023  6:25 AM EDT -----  Labs look good   Cholesterol is normal  Blood sugars have improved since last time

## 2023-05-30 ENCOUNTER — TELEPHONE (OUTPATIENT)
Dept: UROLOGY | Facility: CLINIC | Age: 83
End: 2023-05-30

## 2023-05-30 NOTE — TELEPHONE ENCOUNTER
PT came into the [facility] office today about PT medications  PT was taking Trospium Chloride 20 mg and it was discontinued  PT stated this medication was working and would like to continue taking medications  PT also stated was given Tamsulosin 0 4 mg and this medications is not working  I let PT know I will send all this information to the providers and to wait for a call from the Office  PT verbalized understanding and will wait for our call

## 2023-06-01 DIAGNOSIS — N32.81 OAB (OVERACTIVE BLADDER): Primary | ICD-10-CM

## 2023-06-01 RX ORDER — TROSPIUM CHLORIDE 20 MG/1
20 TABLET, FILM COATED ORAL 2 TIMES DAILY
Qty: 60 TABLET | Refills: 6 | Status: SHIPPED | OUTPATIENT
Start: 2023-06-01 | End: 2023-06-27

## 2023-06-01 NOTE — TELEPHONE ENCOUNTER
Called and spoke to the PT with [facility] rodriguez and PT's medication was sent to the Pharmacy  PT verbalized understanding and was thankful for the call

## 2023-06-23 DIAGNOSIS — N32.81 OAB (OVERACTIVE BLADDER): ICD-10-CM

## 2023-06-27 RX ORDER — TROSPIUM CHLORIDE 20 MG/1
TABLET, FILM COATED ORAL
Qty: 180 TABLET | Refills: 3 | Status: SHIPPED | OUTPATIENT
Start: 2023-06-27 | End: 2023-07-07 | Stop reason: SDUPTHER

## 2023-07-03 DIAGNOSIS — N32.81 OAB (OVERACTIVE BLADDER): ICD-10-CM

## 2023-07-03 NOTE — TELEPHONE ENCOUNTER
It all depends on patient's supplemental insurance. He should ask his insurance company on what anticholinergics would be covered. Anticholinergics are the drug class.   Common ones include oxybutynin, Vesicare, trospium ETC.  will also forward to [place] as she can sometimes help with this concern as well

## 2023-07-03 NOTE — TELEPHONE ENCOUNTER
Patient walked into the office today with questions regarding his medications. Patient no longer taking the tamsulosin, he feels as though it does nothing for him. He's also inquiring about the trospium. He is asking if there is any cheaper medication he can try. I advised him I will route to the providers to see their recommendations. Patient verbalized understanding and verified his call back number is [phone].

## 2023-07-07 RX ORDER — TROSPIUM CHLORIDE 20 MG/1
20 TABLET, FILM COATED ORAL 2 TIMES DAILY
Qty: 180 TABLET | Refills: 3 | Status: SHIPPED | OUTPATIENT
Start: 2023-07-07

## 2023-07-07 NOTE — TELEPHONE ENCOUNTER
Spoke with patient, advised him of the pricing with the GoodRx card via [employer]. Advised patient I will have the providers send the trospium to the [facility] pharmacy on [address]. Patient stated he will stop in the office on Monday to  a GoodRx card. Patient verbalized understanding and thankful for call.

## 2023-08-16 DIAGNOSIS — K21.9 GASTROESOPHAGEAL REFLUX DISEASE WITHOUT ESOPHAGITIS: Primary | Chronic | ICD-10-CM

## 2023-08-16 RX ORDER — PANTOPRAZOLE SODIUM 40 MG/1
40 TABLET, DELAYED RELEASE ORAL
Qty: 90 TABLET | Refills: 3 | Status: SHIPPED | OUTPATIENT
Start: 2023-08-16 | End: 2024-08-10

## 2023-08-21 DIAGNOSIS — J20.9 ACUTE BRONCHITIS WITH BRONCHOSPASM: ICD-10-CM

## 2023-08-21 RX ORDER — ALBUTEROL SULFATE 2.5 MG/3ML
SOLUTION RESPIRATORY (INHALATION)
Qty: 75 ML | Refills: 0 | Status: SHIPPED | OUTPATIENT
Start: 2023-08-21

## 2023-08-21 RX ORDER — ALBUTEROL SULFATE 2.5 MG/3ML
2.5 SOLUTION RESPIRATORY (INHALATION) EVERY 6 HOURS PRN
Qty: 75 ML | Refills: 0 | Status: SHIPPED | OUTPATIENT
Start: 2023-08-21 | End: 2023-08-21

## 2023-08-29 DIAGNOSIS — J43.9 PULMONARY EMPHYSEMA, UNSPECIFIED EMPHYSEMA TYPE (HCC): ICD-10-CM

## 2023-08-29 RX ORDER — ALBUTEROL SULFATE 90 UG/1
2 AEROSOL, METERED RESPIRATORY (INHALATION) 2 TIMES DAILY PRN
Refills: 5 | OUTPATIENT
Start: 2023-08-29

## 2023-08-29 RX ORDER — ALBUTEROL SULFATE 90 UG/1
2 AEROSOL, METERED RESPIRATORY (INHALATION) 2 TIMES DAILY PRN
Qty: 18 G | Refills: 5 | Status: SHIPPED | OUTPATIENT
Start: 2023-08-29

## 2023-09-06 NOTE — CONSULTS
Assessment   1  New onset of headaches after age 48 (64 0) (R46)   2  Cervical spondylolysis (756 19) (M43 02)   3  Chronic sphenoidal sinusitis (473 3) (J32 3)    Plan   Cervical spondylolysis    · *1 - [facility]  *  Status: Active  Requested for: 10Pxi9568   Ordered; For: Cervical spondylolysis; Ordered By: Zane Everett Performed:  Due: 90WNT5798  Care Summary provided  : Yes  Chronic sphenoidal sinusitis    · (1) SED RATE; Status:Active; Requested for:64Wmy3575;    Perform:Located within [facility]; VYT:[id number]; Ordered; For:Chronic sphenoidal sinusitis; Ordered By:Eh Weaver;   · * MRI BRAIN WO CONTRAST; Status:Need Information - Financial Authorization; Requested for:52Tuy6046;    Perform:[facility]; KIY:[id number]; Ordered; For:Chronic sphenoidal sinusitis; Ordered By:Eh Weaver;   · Follow-up visit in 1 month Evaluation and Treatment  Follow-up  Status: Hold For -    Scheduling  Requested for: [id number]   Ordered; For: Chronic sphenoidal sinusitis; Ordered By: Zane Everett Performed:  Due: 79VCI3389  Chronic sphenoidal sinusitis, New onset of headaches after age 48    · Butalbital-APAP-Caffeine -43 MG Oral Capsule; TAKE ONE CAPSULE BY    MOUTH TWICE A DAY AS NEEDED   Rx By: Zane Everett; Dispense: 20 Days ; #:40 Capsule; Refill: 0;For: Chronic sphenoidal sinusitis, New onset of headaches after age 48; LINNEA = N; Verified Transmission to [facility] 0788; Last Updated By: [facility]; 12/29/2017 11:16:06 AM    Discussion/Summary   Discussion Summary:    Patient is a 54-year-old right-handed gentleman with new onset headaches, possible etiologies include sphenoid sinusitis, cervicogenic origin, temporal arteritis and patient also has left benjy sensory dysfunction which could point to an old CVA   He has evidence of cervical strain most likely secondary to cervical spondylosis and at this time is advised an MRI of the brain, will also get a sed rate, and will send the patient for physical therapy to the cervical region and start the patient on Fioricet on a p r n  basis for the headache till his workup is completed  Patient will return back to see me in 1 month  Chief Complaint   Chief Complaint Free Text Note Form: 65year old right handed male patient was referred by Dr Librado Ching for headaches and stuffed up head that has been around for two months         History of Present Illness   HPI: Patient is a 65-year-old right-handed gentleman who comes to us today with stuffiness of his face and his head associated with the headache mostly left-sided, progressively worsening over the last couple of months  Patient states that he gets headaches on the left side of his head, ringing in the ears, and he can't sleep at night  Patient denies any throbbing quality to the headache but does describe a pressure-like feeling in the bifrontal area which worsens when he lay supine  He denies any blurred vision diplopia perioral numbness but in the recent past has also been experiencing sharp throbbing pains in the right temple region  Patient denies any speech or gait difficulty and has had tingling numbness in his right hand occurring intermittently associated with pain  Patient has a history of carotid endarterectomy on the right side few months ago and also had blood work done recently with a normal CRP, Lyme titer, rheumatoid factor, LINDSAY CBC and CMP  Patient admits to quitting smoking a while ago, but does have smokes smell in his breath  Review of Systems   Neurological ROS:      Constitutional: no fever, no chills, no recent weight gain, no recent weight loss, no complaints of feeling tired, no changes in appetite  HEENT: sinus problems,-- feeling congested,-- dryness of the eyes,-- tinnitus-- and-- hoarseness        Cardiovascular:  no chest pain or pressure, no palpitations present, the heart rate was not rapid or irregular, no swelling in the arms or legs, no poor circulation  Respiratory:  no unusual or persistant cough, no shortness of breath with or without exertion  Gastrointestinal:  no nausea, no vomiting, no diarrhea, no abdominal pain, no changes in bowel habits, no melena, no loss of bowel control  Genitourinary: increased frequency  Musculoskeletal:  no arthralgias, no myalgias, no immobility or loss of function, no head/neck/back pain, no pain while walking  Integumentary  no masses, no rash, no skin lesions, no livedo reticularis  Psychiatric:  no anxiety, no depression, no mood swings, no psychiatric hospitalizations, no sleep problems  Endocrine  no unusual weight loss or gain, no excessive urination, no excessive thirst, no hair loss or gain, no hot or cold intolerance, no menstrual period change or irregularity, no loss of sexual ability or drive, no erection difficulty, no nipple discharge  Hematologic/Lymphatic:  no unusual bleeding, no tendency for easy bruising, no clotting skin or lumps  Neurological General: headache,-- lightheadedness,-- trouble falling asleep,-- snoring-- and-- waking up at night  Neurological Mental Status: memory problems  Neurological Cranial Nerves: vertigo or dizziness  Neurological Motor findings include:  no tremor, no twitching, no cramping(pre/post exercise), no atrophy  Neurological Coordination: balance difficulties  Neurological Sensory: numbness-- and-- tingling  Neurological Gait: difficulty walking  ROS Reviewed:    ROS reviewed  Active Problems   1  Abnormal CT scan, gastrointestinal tract (793 4) (R93 3)   2  Abnormal loss of weight (783 21) (R63 4)   3  Atherosclerosis of coronary artery (414 00) (I25 10)   4  Atherosclerosis of coronary artery (414 00) (I25 10)   5  Back pain (724 5) (M54 9)   6  Benign localized prostatic hyperplasia with lower urinary tract symptoms (LUTS)     (600 21,599 69) (N40 1)   7   Bilateral impacted cerumen (380 4) (H61 23)   8  Blood in stool (578 1) (K92 1)   9  CABG   10  Carotid artery stenosis (433 10) (I65 29)   11  Carotid bruit (785 9) (R09 89)   12  Cataract (366 9) (H26 9)   13  Cervical radiculopathy (723 4) (M54 12)   14  Chest discomfort (786 59) (R07 89)   15  Chronic left shoulder pain (719 41,338 29) (M25 512,G89 29)   16  Chronic nonintractable headache, unspecified headache type (784 0) (R51)   17  Chronic obstructive pulmonary disease (496) (J44 9)   18  Constipation (564 00) (K59 00)   19  Diarrhea (787 91) (R19 7)   20  Dizziness (780 4) (R42)   21  Emphysema (492 8) (J43 9)   22  Encounter for screening for malignant neoplasm of colon (V76 51) (Z12 11)   23  Esophageal reflux (530 81) (K21 9)   24  Essential hypertension (401 9) (I10)   25  Flu vaccine need (V04 81) (Z23)   26  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)   27  Heart disease (429 9) (I51 9)   28  Hyperlipidemia (272 4) (E78 5)   29  Impaired fasting glucose (790 21) (R73 01)   30  Inguinal hernia (550 90) (K40 90)   31  Joint pain in fingers of left hand (719 44) (M25 542)   32  Left flank pain (789 09) (R10 9)   33  Memory loss (780 93) (R41 3)   34  Need for prophylactic vaccination against Streptococcus pneumoniae (pneumococcus)      (V03 82) (Z23)   35  Need for revaccination (V05 9) (Z23)   36  Need for vaccination for DTP (V06 1) (Z23)   37  Nephrolithiasis (592 0) (N20 0)   38  Occlusion and stenosis of unspecified carotid artery (433 10) (I65 29)   39  Osteoarthritis of hip (715 95) (M16 9)   40  Pain in both hands (729 5) (M79 641,M79 642)   41  Pain in the abdomen (789 00) (R10 9)   42  Plantar warts (078 12) (B07 0)   43  Pre-operative cardiovascular examination (V72 81) (Z01 810)   44  Preoperative clearance (V72 84) (Z01 818)   45  PVD (peripheral vascular disease) (443 9) (I73 9)   46  Renal colic (803 4) (Z40)   47  Right foot pain (729 5) (M79 671)   48   Screening for genitourinary condition (V81 6) (Z13 89)   49  Shortness of breath (786 05) (R06 02)   50  Special screening for malignant neoplasm of prostate (V76 44) (Z12 5)   51  Strain of neck muscle, initial encounter (847 0) (S16 1XXA)   52  Subjective tinnitus of left ear (388 31) (H93 12)   53  Trigger finger, left (727 03) (M65 30)    Past Medical History   1  History of Coronary Artery Disease (V12 59)   2  Denied: History of depression   3  History of essential hypertension (V12 59) (Z86 79)   4  History of hyperlipidemia (V12 29) (Z86 39)   5  Denied: History of substance abuse   6  Need for vaccination for DTP (V06 1) (Z23)   7  History of Renal lesion (593 9) (N28 9)  Active Problems And Past Medical History Reviewed: The active problems and past medical history were reviewed and updated today  Surgical History   1  CABG   2  History of Coronary Artery Surgery   3  History of Endarterectomy Internal Carotid   4  History of Hernia Repair   5  History of Hip Replacement   6  History of Renal Lithotripsy  Surgical History Reviewed: The surgical history was reviewed and updated today  Family History   Mother    1  Denied: Family history of Depression with anxiety   2  Denied: Family history of substance abuse   3  Family history of Nephrolithiasis  Father    4  Denied: Family history of Depression with anxiety   5  Family history of myocardial infarction (V17 3) (Z82 49)   6  Denied: Family history of substance abuse   7  Family history of Stroke Syndrome (V17 1)  Family History Reviewed: The family history was reviewed and updated today  Social History    · Being A Social Drinker   · Caffeine use (V49 89) (F15 90)   · Denied: History of Drug use   · Employed   · Former smoker (V15 82) (U49 926)   · Graduated from high school   · Living alone (V60 3) (Z60 2)   · Marital History -    · No illicit drug use   · Two children  Social History Reviewed: The social history was reviewed and updated today  Current Meds    1  Aspirin EC 81 MG Oral Tablet Delayed Release; 2 tabs daily; Therapy: (Recorded:05Jun2015) to Recorded   2  Donepezil HCl - 5 MG Oral Tablet; TAKE 1 TABLET Bedtime; Therapy: 92XRE7867 to (Evaluate:22Apr2017)  Requested for: 24Oct2016; Last     Rx:24Oct2016 Ordered   3  Metoprolol Succinate ER 50 MG Oral Tablet Extended Release 24 Hour; TAKE 1 TABLET     DAILY  Requested for: 20Ohp6780; Last Rx:25Zyi3131 Ordered   4  Multivitamins Oral Capsule; TAKE 1 CAPSULE DAILY; Therapy: (Dominick Rodriguez) to Recorded   5  Nitroglycerin 0 4 MG Sublingual Tablet Sublingual; TAKE AS DIRECTED; Therapy: 90UYM1145 to (Last Rx:18Oct2017)  Requested for: 50AHB6671 Ordered   6  Prevnar 13 Intramuscular Suspension; inject as directed; Therapy: 10QCT1570 to (Last Rx:12Nov2015)  Requested for: 36RNI2988 Ordered   7  Simvastatin 40 MG Oral Tablet; take 1 tablet daily at bedtime  Requested for: 72Snm7261;     Last Rx:65Bnh3366 Ordered   8  Tamsulosin HCl - 0 4 MG Oral Capsule; take 1 capsule daily; Therapy: 24Apr2014 to (Complete:24Feb2018)  Requested for: 73Eoi7738; Last     Rx:10Uck8740 Ordered   9  Vitamin C TABS; TAKE 1 TABLET DAILY; Therapy: (VXNULFZH:77OHR6421) to Recorded  Medication List Reviewed: The medication list was reviewed and updated today  Allergies   1  No Known Drug Allergies  2  No Known Environmental Allergies   3  No Known Food Allergies    Vitals   Signs   Recorded: 73WOS5790 10:10AM   Heart Rate: 82  Systolic: 317  Diastolic: 66  Height: 5 ft 6 5 in  Weight: 168 lb   BMI Calculated: 26 71  BSA Calculated: 1 87    Physical Exam        Constitutional      General Appearance: Appears appropriate for age, healthy, well developed, appropriately groomed and appropriately dressed       Head and Face      Head and face: Atraumatic on inspection   Facial strength normal       Eyes      Ophthalmoscopic examination: Vision is grossly normal  Gross visual field testing by confrontation shows no abnormalities  EOMI in both eyes  Conjunctivae clear  Eyelids normal palpebral fissures equal  Orbits exhibit normal position  No discharge from the eyes  PERRL  External inspection of ears and nose: Normal          Neck      Neck and thyroid: Normal to inspection and palpation  Cardiovascular      Carotid pulses: Normal, 2+ bilaterally  Musculoskeletal      Gait and Station: Walks with normal gait  Tandem walk test is normal  Romberg's test is negative  -- Patient has left-sided cervical paraspinal tenderness, tenderness along the left trapezius, he also has evidence of tenderness in the ethmoid region, and no tenderness was felt along the temporal artery  Muscle strength: Normal strength throughout  Muscle tone: No atrophy, abnormal movements, flaccidity, cogwheeling or spasticity  Inspection/palpation of joints, bones, and muscles: Abnormal              Skin      Skin: skin warm and dry with no evidence of unusual rashes or suspicious lesions  Neurologic      Orientation to person, place, and time: Normal        Language: Names objects, able to repeat phrases and speaks spontaneously  Sensation: Abnormal  -- Patient has evidence of left benjy sensory dysfunction affecting the face arm and leg  Reflexes: Abnormal  -- Deep tendon reflexes are somewhat asymmetric with prominent reflexes in the lower extremities and diminished in the upper extremities  Coordination: Cerebellum function intact  No involuntary movement or psychomotor activity  Cortical function: Normal        Cranial Nerve Exam      II: Normal with no deficit  III,IV, VI: Normal with no deficit  V: Normal with no deficit  VII: Normal with no deficit  VIII: Normal with no deficit  IX: Normal with no deficit  X: Normal with no deficit  XI: Normal with no deficit  XII: Normal with no deficit             Future Appointments Date/Time Provider Specialty Site   02/05/2018 12:30 PM TIFFANI Rosas  Internal Medicine [facility]   01/04/2018 10:20 AM TIFFANI Chou  Cardiology [facility]     Signatures    Electronically signed by :  Rosaline Welch MD; Dec 29 2017 11:22AM EST                       (Author) 240

## 2023-09-12 ENCOUNTER — OFFICE VISIT (OUTPATIENT)
Dept: CARDIOLOGY CLINIC | Facility: CLINIC | Age: 83
End: 2023-09-12
Payer: MEDICARE

## 2023-09-12 VITALS
RESPIRATION RATE: 16 BRPM | HEART RATE: 70 BPM | DIASTOLIC BLOOD PRESSURE: 70 MMHG | HEIGHT: 67 IN | WEIGHT: 150 LBS | BODY MASS INDEX: 23.54 KG/M2 | SYSTOLIC BLOOD PRESSURE: 124 MMHG | OXYGEN SATURATION: 99 %

## 2023-09-12 DIAGNOSIS — I25.10 ATHEROSCLEROSIS OF NATIVE CORONARY ARTERY OF NATIVE HEART WITHOUT ANGINA PECTORIS: Primary | ICD-10-CM

## 2023-09-12 DIAGNOSIS — E78.00 HYPERCHOLESTEREMIA: ICD-10-CM

## 2023-09-12 DIAGNOSIS — R07.89 CHEST DISCOMFORT: ICD-10-CM

## 2023-09-12 DIAGNOSIS — I10 ESSENTIAL HYPERTENSION: ICD-10-CM

## 2023-09-12 PROCEDURE — 99214 OFFICE O/P EST MOD 30 MIN: CPT | Performed by: INTERNAL MEDICINE

## 2023-09-12 RX ORDER — MONTELUKAST SODIUM 10 MG/1
TABLET ORAL
COMMUNITY
Start: 2023-08-18

## 2023-09-12 NOTE — PROGRESS NOTES
[facility] [place]  [place] [id number]  Cardiology Follow Up    Daron Carmel  1940  [id number]      1. Atherosclerosis of native coronary artery of native heart without angina pectoris  NM myocardial perfusion spect (rx stress and/or rest)      2. Essential hypertension        3. Hypercholesteremia        4. Chest discomfort  NM myocardial perfusion spect (rx stress and/or rest)          Chief Complaint   Patient presents with   • Follow-up       Interval History: Patient presents for follow-up visit. Patient does have symptoms of exertional chest discomfort. Patient also has some shortness of breath. Patient was recently evaluated by pulmonary. Patient is getting PFT as well as CT of the chest.  No recent functional cardiac evaluation. No history of leg edema orthopnea PND. He states that he has been compliant all his present medications.     Patient Active Problem List   Diagnosis   • Hyperlipidemia   • Chronic nonintractable headache   • Pulmonary emphysema (HCC)   • Benign prostatic hyperplasia without lower urinary tract symptoms   • Carotid artery stenosis   • Hx of CABG   • Atherosclerosis of coronary artery   • Essential hypertension   • Cataract   • Cervical radiculopathy   • Chronic sphenoidal sinusitis   • Esophageal reflux   • Foraminal stenosis of thoracic region   • Impaired fasting glucose   • Osteoarthritis of hip   • Abdominal aortic aneurysm without rupture (HCC)   • VARGHESE (obstructive sleep apnea)   • DDD (degenerative disc disease), lumbar   • Infrarenal abdominal aortic aneurysm (AAA) without rupture ([address])     Past Medical History:   Diagnosis Date   • Benign prostatic hyperplasia    • CAD (coronary artery disease)    • Cervicogenic headache    • COPD (chronic obstructive pulmonary disease) (HCC)    • GERD (gastroesophageal reflux disease)    • Hx of CABG    • Hyperlipemia    • Hypertension    • Kidney stone    • Renal lesion Social History     Socioeconomic History   • Marital status:      Spouse name: Not on file   • Number of children: 2   • Years of education: Not on file   • Highest education level: Not on file   Occupational History   • Not on file   Tobacco Use   • Smoking status: Former     Packs/day: 1.00     Years: 60.00     Total pack years: 60.00     Types: Cigarettes     Quit date:      Years since quittin.7     Passive exposure: Current   • Smokeless tobacco: Never   Vaping Use   • Vaping Use: Never used   Substance and Sexual Activity   • Alcohol use: Yes     Alcohol/week: 1.0 standard drink of alcohol     Types: 1 Shots of liquor per week     Comment: social shot once a week   • Drug use: No   • Sexual activity: Yes     Partners: Female   Other Topics Concern   • Not on file   Social History Narrative    Caffeine use     Social Determinants of Health     Financial Resource Strain: Low Risk  (2023)    Overall Financial Resource Strain (CARDIA)    • Difficulty of Paying Living Expenses: Not hard at all   Food Insecurity: Not on file   Transportation Needs: No Transportation Needs (2023)    PRAPARE - Transportation    • Lack of Transportation (Medical): No    • Lack of Transportation (Non-Medical): No   Physical Activity: Inactive (10/13/2021)    Exercise Vital Sign    • Days of Exercise per Week: 0 days    • Minutes of Exercise per Session: 0 min   Stress: No Stress Concern Present (10/13/2021)    [facility]    • Feeling of Stress :  Only a little   Social Connections: Not on file   Intimate Partner Violence: Not on file   Housing Stability: Not on file      Family History   Problem Relation Age of Onset   • Nephrolithiasis Mother    • Heart attack Father    • Stroke Father      Past Surgical History:   Procedure Laterality Date   • CAROTID ENDARTERECTOMY Right    • COLONOSCOPY     • CORONARY ARTERY BYPASS GRAFT     • HERNIA REPAIR      with mesh   • JOINT REPLACEMENT Right    • LITHOTRIPSY     • VT CYSTO INSERTION TRANSPROSTATIC IMPLANT SINGLE N/A 5/27/2021    Procedure: CYSTOSCOPY WITH INSERTION UROLIFT;  Surgeon: Za Vanegas MD;  Location: [facility];  Service: Urology   • TOTAL HIP ARTHROPLASTY Right 2015       Current Outpatient Medications:   •  albuterol (2.5 mg/3 mL) 0.083 % nebulizer solution, INHALE 3 ML BY NEBULIZATION EVERY 6 HOURS AS NEEDED FOR WHEEZING, Disp: 75 mL, Rfl: 0  •  albuterol (PROVENTIL HFA,VENTOLIN HFA) 90 mcg/act inhaler, Inhale 2 puffs 2 (two) times a day as needed for wheezing, Disp: 18 g, Rfl: 5  •  Ascorbic Acid (VITAMIN C) 100 MG tablet, Take 1 tablet by mouth daily, Disp: , Rfl:   •  aspirin (ECOTRIN LOW STRENGTH) 81 mg EC tablet, Take 81 mg by mouth daily, Disp: , Rfl:   •  azelastine (ASTELIN) 0.1 % nasal spray, 1-2 sprays into each nostril 2 (two) times a day, Disp: 30 mL, Rfl: 5  •  B Complex Vitamins (VITAMIN B COMPLEX PO), Take 1 tablet by mouth daily, Disp: , Rfl:   •  Cholecalciferol 50 MCG (2000 UT) CAPS, Take 1 capsule by mouth, Disp: , Rfl:   •  fluticasone (FLONASE) 50 mcg/act nasal spray, 2 sprays into each nostril daily, Disp: 16 g, Rfl: 5  •  metoprolol succinate (TOPROL-XL) 50 mg 24 hr tablet, Take 1 tablet (50 mg total) by mouth daily, Disp: 90 tablet, Rfl: 2  •  montelukast (SINGULAIR) 10 mg tablet, TAKE 1 TABLET BY MOUTH EVERY DAY AT NIGHT, Disp: , Rfl:   •  pantoprazole (PROTONIX) 40 mg tablet, Take 1 tablet (40 mg total) by mouth daily before breakfast, Disp: 90 tablet, Rfl: 3  •  simvastatin (ZOCOR) 40 mg tablet, Take 1 tablet (40 mg total) by mouth daily at bedtime, Disp: 90 tablet, Rfl: 3  •  trospium chloride (SANCTURA) 20 mg tablet, Take 1 tablet (20 mg total) by mouth 2 (two) times a day, Disp: 180 tablet, Rfl: 3  Allergies   Allergen Reactions   • Prednisone Other (See Comments)     Constipation         Labs:  No visits with results within 2 Month(s) from this visit. Latest known visit with results is:   Appointment on 05/11/2023   Component Date Value   • Hemoglobin A1C 05/11/2023 5.9 (H)    • EAG 05/11/2023 123    • WBC 05/11/2023 7.81    • RBC 05/11/2023 4.38    • Hemoglobin 05/11/2023 14.7    • Hematocrit 05/11/2023 45.3    • MCV 05/11/2023 103 (H)    • MCH 05/11/2023 33.6    • MCHC 05/11/2023 32.5    • RDW 05/11/2023 12.8    • Platelets 59/87/7866 211    • MPV 05/11/2023 10.3    • Sodium 05/11/2023 138    • Potassium 05/11/2023 4.3    • Chloride 05/11/2023 109 (H)    • CO2 05/11/2023 26    • ANION GAP 05/11/2023 3 (L)    • BUN 05/11/2023 23    • Creatinine 05/11/2023 1.03    • Glucose, Fasting 05/11/2023 75    • Calcium 05/11/2023 9.0    • AST 05/11/2023 24    • ALT 05/11/2023 20    • Alkaline Phosphatase 05/11/2023 82    • Total Protein 05/11/2023 7.1    • Albumin 05/11/2023 3.8    • Total Bilirubin 05/11/2023 0.37    • eGFR 05/11/2023 66    • Cholesterol 05/11/2023 104    • Triglycerides 05/11/2023 107    • HDL, Direct 05/11/2023 38 (L)    • LDL Calculated 05/11/2023 45      Imaging: No results found.     Review of Systems:  Review of Systems   REVIEW OF SYSTEMS:  Constitutional:  Denies fever or chills   Eyes:  Denies change in visual acuity   HENT:  Denies nasal congestion or sore throat   Respiratory:   shortness of breath   Cardiovascular: Chest discomfort  GI:  Denies abdominal pain, nausea, vomiting, bloody stools or diarrhea   :  Denies dysuria, frequency, difficulty in micturition and nocturia  Musculoskeletal: DJD  Neurologic:  Denies headache, focal weakness or sensory changes   Endocrine:  Denies polyuria or polydipsia   Lymphatic:  Denies swollen glands   Psychiatric:  Denies depression or anxiety    Physical Exam:    /70 (BP Location: Left arm, Patient Position: Sitting, Cuff Size: Standard)   Pulse 70   Resp 16   Ht 5' 7" (1.702 m)   Wt 68 kg (150 lb)   SpO2 99%   BMI 23.49 kg/m²     Physical Exam  PHYSICAL EXAM:  General:  Patient is not in acute distress   Head: Normocephalic, Atraumatic. HEENT:  Both pupils normal-size atraumatic, normocephalic, nonicteric  Neck:  JVP not raised. Trachea central. No carotid bruit  Respiratory:  normal breath sounds no crackles. no rhonchi  Cardiovascular:  Regular rate and rhythm no S3 no murmurs  GI:  Abdomen soft nontender. No organomegaly. Lymphatic:  No cervical or inguinal lymphadenopathy  Neurologic:  Patient is awake alert, oriented . Grossly nonfocal  Extremities no edema    Discussion/Summary:  Patient with multiple medical problems who seems to be doing reasonably well from cardiac standpoint. Previous studies reviewed with patient. Medications reviewed and possible side effects discussed. concepts of cardiovascular disease , signs and symptoms of heart disease. Dietary and risk factor modification reinforced. All questions answered. Safety measures reviewed. Patient advised to report any problems prompting medical attention. Given symptoms of exertional chest pain and shortness of breath, patient will be scheduled for pharmacological nuclear stress test to assess for ischemia. Sensitivity and specificity of stress test and different modalities of cardiac imaging discussed with patient. Symptoms watch her from cardiac standpoint which would indicate the need for further cardiac evaluation also discussed. All his medications were reviewed. Continue to follow-up with primary care physician as well as pulmonary. Follow-up in 6 months or earlier as needed. Patient is agreeable with the plan of care. Patient is unable to exercise on the treadmill because of DJD.

## 2023-09-26 ENCOUNTER — HOSPITAL ENCOUNTER (OUTPATIENT)
Dept: NON INVASIVE DIAGNOSTICS | Facility: CLINIC | Age: 83
Discharge: HOME | End: 2023-09-26
Payer: MEDICARE

## 2023-09-26 VITALS
BODY MASS INDEX: 23.54 KG/M2 | WEIGHT: 150 LBS | HEART RATE: 69 BPM | HEIGHT: 67 IN | DIASTOLIC BLOOD PRESSURE: 86 MMHG | OXYGEN SATURATION: 99 % | SYSTOLIC BLOOD PRESSURE: 186 MMHG

## 2023-09-26 DIAGNOSIS — R07.89 CHEST DISCOMFORT: ICD-10-CM

## 2023-09-26 DIAGNOSIS — I25.10 ATHEROSCLEROSIS OF NATIVE CORONARY ARTERY OF NATIVE HEART WITHOUT ANGINA PECTORIS: ICD-10-CM

## 2023-09-26 LAB
NUC STRESS DIASTOLIC VOLUME INDEX: 52 ML/M2
NUC STRESS EJECTION FRACTION: 73 %
NUC STRESS SYSTOLIC VOLUME INDEX: 14 ML/M2
RATE PRESSURE PRODUCT: NORMAL
SL CV REST NUCLEAR ISOTOPE DOSE: 10.81 MCI
SL CV STRESS NUCLEAR ISOTOPE DOSE: 31.9 MCI
SL CV STRESS RECOVERY BP: NORMAL MMHG
SL CV STRESS RECOVERY HR: 89 BPM
SL CV STRESS RECOVERY O2 SAT: 100 %
STRESS ANGINA INDEX: 0
STRESS BASELINE BP: NORMAL MMHG
STRESS BASELINE HR: 69 BPM
STRESS O2 SAT REST: 99 %
STRESS PEAK HR: 96 BPM
STRESS POST O2 SAT PEAK: 98 %
STRESS POST PEAK BP: 134 MMHG
STRESS/REST PERFUSION RATIO: 1.01

## 2023-09-26 PROCEDURE — 93017 CV STRESS TEST TRACING ONLY: CPT

## 2023-09-26 PROCEDURE — 93018 CV STRESS TEST I&R ONLY: CPT | Performed by: INTERNAL MEDICINE

## 2023-09-26 PROCEDURE — 93016 CV STRESS TEST SUPVJ ONLY: CPT | Performed by: INTERNAL MEDICINE

## 2023-09-26 PROCEDURE — A9502 TC99M TETROFOSMIN: HCPCS

## 2023-09-26 PROCEDURE — 78452 HT MUSCLE IMAGE SPECT MULT: CPT

## 2023-09-26 PROCEDURE — 78452 HT MUSCLE IMAGE SPECT MULT: CPT | Performed by: INTERNAL MEDICINE

## 2023-09-26 PROCEDURE — G1004 CDSM NDSC: HCPCS

## 2023-09-26 RX ORDER — REGADENOSON 0.08 MG/ML
0.4 INJECTION, SOLUTION INTRAVENOUS ONCE
Status: COMPLETED | OUTPATIENT
Start: 2023-09-26 | End: 2023-09-26

## 2023-09-26 RX ADMIN — REGADENOSON 0.4 MG: 0.08 INJECTION, SOLUTION INTRAVENOUS at 08:55

## 2023-09-27 ENCOUNTER — TELEPHONE (OUTPATIENT)
Dept: CARDIOLOGY CLINIC | Facility: CLINIC | Age: 83
End: 2023-09-27

## 2023-09-27 DIAGNOSIS — I10 ESSENTIAL HYPERTENSION: ICD-10-CM

## 2023-09-27 LAB
CHEST PAIN STATEMENT: NORMAL
MAX DIASTOLIC BP: 86 MMHG
MAX HEART RATE: 96 BPM
MAX PREDICTED HEART RATE: 136 BPM
MAX. SYSTOLIC BP: 186 MMHG
PROTOCOL NAME: NORMAL
REASON FOR TERMINATION: NORMAL
TARGET HR FORMULA: NORMAL
TIME IN EXERCISE PHASE: NORMAL

## 2023-09-27 RX ORDER — METOPROLOL SUCCINATE 50 MG/1
50 TABLET, EXTENDED RELEASE ORAL DAILY
Qty: 90 TABLET | Refills: 2 | Status: SHIPPED | OUTPATIENT
Start: 2023-09-27

## 2023-09-27 NOTE — TELEPHONE ENCOUNTER
----- Message from Patty Rosado MD sent at 9/26/2023  4:27 PM EDT -----  Please call the patient. Stress test shows no evidence of ischemia with normal ejection fraction. Patient to continue his present medications and report any symptoms out of the ordinary.

## 2023-10-10 DIAGNOSIS — N32.81 OAB (OVERACTIVE BLADDER): ICD-10-CM

## 2023-10-10 RX ORDER — TROSPIUM CHLORIDE 20 MG/1
20 TABLET, FILM COATED ORAL 2 TIMES DAILY
Qty: 180 TABLET | Refills: 1 | Status: SHIPPED | OUTPATIENT
Start: 2023-10-10

## 2023-11-15 DIAGNOSIS — E78.00 HYPERCHOLESTEROLEMIA: ICD-10-CM

## 2023-11-15 RX ORDER — SIMVASTATIN 40 MG
40 TABLET ORAL
Qty: 90 TABLET | Refills: 3 | Status: SHIPPED | OUTPATIENT
Start: 2023-11-15 | End: 2023-11-21 | Stop reason: SDUPTHER

## 2023-11-21 DIAGNOSIS — E78.00 HYPERCHOLESTEROLEMIA: ICD-10-CM

## 2023-11-21 RX ORDER — SIMVASTATIN 40 MG
40 TABLET ORAL
Qty: 90 TABLET | Refills: 3 | Status: SHIPPED | OUTPATIENT
Start: 2023-11-21

## 2024-06-16 DIAGNOSIS — N32.81 OAB (OVERACTIVE BLADDER): ICD-10-CM

## 2024-06-17 RX ORDER — TROSPIUM CHLORIDE 20 MG/1
20 TABLET, FILM COATED ORAL 2 TIMES DAILY
Qty: 180 TABLET | Refills: 1 | OUTPATIENT
Start: 2024-06-17

## 2024-07-09 DIAGNOSIS — I10 ESSENTIAL HYPERTENSION: ICD-10-CM

## 2024-07-09 DIAGNOSIS — K21.9 GASTROESOPHAGEAL REFLUX DISEASE WITHOUT ESOPHAGITIS: Chronic | ICD-10-CM

## 2024-07-09 NOTE — TELEPHONE ENCOUNTER
Pt walked in requesting refills with refills send to [facility]   Call pt when done [phone]

## 2024-07-10 RX ORDER — PANTOPRAZOLE SODIUM 40 MG/1
40 TABLET, DELAYED RELEASE ORAL
Qty: 90 TABLET | Refills: 0 | Status: SHIPPED | OUTPATIENT
Start: 2024-07-10 | End: 2025-07-05

## 2024-07-10 RX ORDER — METOPROLOL SUCCINATE 50 MG/1
50 TABLET, EXTENDED RELEASE ORAL DAILY
Qty: 90 TABLET | Refills: 0 | Status: SHIPPED | OUTPATIENT
Start: 2024-07-10

## 2024-07-10 NOTE — TELEPHONE ENCOUNTER
Hasn't been seen in a year. No showed to last two appointments. Last refill I can provide until he is seen. He doesn't need to be squeezed in urgently FYI as I gave him 90 day supply.

## 2024-07-19 ENCOUNTER — TELEPHONE (OUTPATIENT)
Age: 84
End: 2024-07-19

## 2024-07-19 NOTE — TELEPHONE ENCOUNTER
Left message for pt to schedule overdue annual wellness appt with dr menezes or PA for next available

## 2024-08-08 ENCOUNTER — OFFICE VISIT (OUTPATIENT)
Dept: UROLOGY | Facility: CLINIC | Age: 84
End: 2024-08-08
Payer: MEDICARE

## 2024-08-08 VITALS
OXYGEN SATURATION: 99 % | SYSTOLIC BLOOD PRESSURE: 123 MMHG | TEMPERATURE: 97.5 F | WEIGHT: 156 LBS | DIASTOLIC BLOOD PRESSURE: 72 MMHG | HEIGHT: 67 IN | BODY MASS INDEX: 24.48 KG/M2 | HEART RATE: 71 BPM

## 2024-08-08 DIAGNOSIS — N32.81 OAB (OVERACTIVE BLADDER): Primary | ICD-10-CM

## 2024-08-08 LAB — POST-VOID RESIDUAL VOLUME, ML POC: 35 ML

## 2024-08-08 PROCEDURE — 51798 US URINE CAPACITY MEASURE: CPT | Performed by: PHYSICIAN ASSISTANT

## 2024-08-08 PROCEDURE — 99213 OFFICE O/P EST LOW 20 MIN: CPT | Performed by: PHYSICIAN ASSISTANT

## 2024-08-08 RX ORDER — AZITHROMYCIN 250 MG/1
TABLET, FILM COATED ORAL
COMMUNITY
Start: 2024-07-13

## 2024-08-08 RX ORDER — ECHINACEA PURPUREA EXTRACT 125 MG
1 TABLET ORAL
COMMUNITY
Start: 2024-02-27

## 2024-08-08 RX ORDER — TIOTROPIUM BROMIDE INHALATION SPRAY 3.12 UG/1
SPRAY, METERED RESPIRATORY (INHALATION) DAILY
COMMUNITY
Start: 2024-05-22

## 2024-08-08 RX ORDER — PREDNISONE 10 MG/1
TABLET ORAL
COMMUNITY
Start: 2024-05-22

## 2024-08-08 RX ORDER — CETIRIZINE HYDROCHLORIDE 10 MG/1
10 TABLET ORAL DAILY
COMMUNITY
Start: 2024-08-06 | End: 2024-09-05

## 2024-08-08 RX ORDER — MELOXICAM 7.5 MG/1
TABLET ORAL
COMMUNITY
Start: 2024-05-09

## 2024-08-08 RX ORDER — TOLTERODINE 4 MG/1
4 CAPSULE, EXTENDED RELEASE ORAL DAILY
Qty: 90 CAPSULE | Refills: 3 | Status: SHIPPED | OUTPATIENT
Start: 2024-08-08

## 2024-09-03 DIAGNOSIS — N32.81 OAB (OVERACTIVE BLADDER): Primary | ICD-10-CM

## 2024-09-03 RX ORDER — TROSPIUM CHLORIDE 20 MG/1
20 TABLET, FILM COATED ORAL 2 TIMES DAILY
Qty: 180 TABLET | Refills: 1 | Status: SHIPPED | OUTPATIENT
Start: 2024-09-03

## 2024-09-10 ENCOUNTER — TELEPHONE (OUTPATIENT)
Dept: UROLOGY | Facility: CLINIC | Age: 84
End: 2024-09-10

## 2024-09-10 NOTE — TELEPHONE ENCOUNTER
This nurse called [facility] pharmacy and spoke with Raul the pharmacist to inquire about the availability of patient medication Trospium Chloride as patient stated med was not given to him during time of ; instead patient was given Tolterodine Tartrate. Raul states Trospium was on hold due to pharmacy receiving script for Tolterodine which is much more expensive. This nurse made pharmacist aware that patient will like to have Trospium filled as this is affordable. Pharmacist states Trospium will be ready within an hour for patient to  today.     This nurse called patient to make him aware of conversation had with pharmacy. Patient verbalizes understanding and is thankful for call.

## 2024-10-05 DIAGNOSIS — I10 ESSENTIAL HYPERTENSION: ICD-10-CM

## 2024-10-07 ENCOUNTER — OFFICE VISIT (OUTPATIENT)
Age: 84
End: 2024-10-07
Payer: MEDICARE

## 2024-10-07 VITALS
BODY MASS INDEX: 24.89 KG/M2 | OXYGEN SATURATION: 96 % | TEMPERATURE: 94.4 F | DIASTOLIC BLOOD PRESSURE: 70 MMHG | HEIGHT: 67 IN | HEART RATE: 80 BPM | WEIGHT: 158.6 LBS | SYSTOLIC BLOOD PRESSURE: 122 MMHG

## 2024-10-07 DIAGNOSIS — K21.9 GASTROESOPHAGEAL REFLUX DISEASE WITHOUT ESOPHAGITIS: Chronic | ICD-10-CM

## 2024-10-07 DIAGNOSIS — J43.9 PULMONARY EMPHYSEMA, UNSPECIFIED EMPHYSEMA TYPE (HCC): Chronic | ICD-10-CM

## 2024-10-07 DIAGNOSIS — E78.2 MIXED HYPERLIPIDEMIA: Chronic | ICD-10-CM

## 2024-10-07 DIAGNOSIS — R51.9 ACUTE NONINTRACTABLE HEADACHE, UNSPECIFIED HEADACHE TYPE: ICD-10-CM

## 2024-10-07 DIAGNOSIS — I10 ESSENTIAL HYPERTENSION: ICD-10-CM

## 2024-10-07 DIAGNOSIS — Z00.00 MEDICARE ANNUAL WELLNESS VISIT, SUBSEQUENT: Primary | ICD-10-CM

## 2024-10-07 PROCEDURE — G0439 PPPS, SUBSEQ VISIT: HCPCS

## 2024-10-07 PROCEDURE — 99213 OFFICE O/P EST LOW 20 MIN: CPT

## 2024-10-07 RX ORDER — METOPROLOL SUCCINATE 50 MG/1
50 TABLET, EXTENDED RELEASE ORAL DAILY
Qty: 90 TABLET | Refills: 0 | Status: SHIPPED | OUTPATIENT
Start: 2024-10-07

## 2024-10-07 RX ORDER — FLUTICASONE PROPIONATE 50 MCG
2 SPRAY, SUSPENSION (ML) NASAL DAILY
Qty: 16 G | Refills: 5 | Status: SHIPPED | OUTPATIENT
Start: 2024-10-07

## 2024-10-07 RX ORDER — METOPROLOL SUCCINATE 50 MG/1
50 TABLET, EXTENDED RELEASE ORAL DAILY
Qty: 90 TABLET | Refills: 0 | OUTPATIENT
Start: 2024-10-07

## 2024-10-07 NOTE — ASSESSMENT & PLAN NOTE
Breathing stable.  Continue current inhalers. O2 96%, continue to monitor and maintain smoking cessation

## 2024-10-07 NOTE — ASSESSMENT & PLAN NOTE
Controlled, continue current medications and dash diet  Refills provided  Complete labs in 3-4 months. Labs from May reviewed  Orders:    metoprolol succinate (TOPROL-XL) 50 mg 24 hr tablet; Take 1 tablet (50 mg total) by mouth daily    CBC and differential; Future    Comprehensive metabolic panel; Future

## 2024-10-07 NOTE — PATIENT INSTRUCTIONS
Medicare Preventive Visit Patient Instructions  Thank you for completing your Welcome to Medicare Visit or Medicare Annual Wellness Visit today. Your next wellness visit will be due in one year (10/8/2025).  The screening/preventive services that you may require over the next 5-10 years are detailed below. Some tests may not apply to you based off risk factors and/or age. Screening tests ordered at today's visit but not completed yet may show as past due. Also, please note that scanned in results may not display below.  Preventive Screenings:  Service Recommendations Previous Testing/Comments   Colorectal Cancer Screening  Colonoscopy    Fecal Occult Blood Test (FOBT)/Fecal Immunochemical Test (FIT)  Fecal DNA/Cologuard Test  Flexible Sigmoidoscopy Age: 45-75 years old   Colonoscopy: every 10 years (May be performed more frequently if at higher risk)  OR  FOBT/FIT: every 1 year  OR  Cologuard: every 3 years  OR  Sigmoidoscopy: every 5 years  Screening may be recommended earlier than age 45 if at higher risk for colorectal cancer. Also, an individualized decision between you and your healthcare provider will decide whether screening between the ages of 76-85 would be appropriate. Colonoscopy: Not on file  FOBT/FIT: Not on file  Cologuard: Not on file  Sigmoidoscopy: Not on file          Prostate Cancer Screening Individualized decision between patient and health care provider in men between ages of 55-69   Medicare will cover every 12 months beginning on the day after your 50th birthday PSA: 2.3 ng/mL           Hepatitis C Screening Once for adults born between 1945 and 1965  More frequently in patients at high risk for Hepatitis C Hep C Antibody: Not on file        Diabetes Screening 1-2 times per year if you're at risk for diabetes or have pre-diabetes Fasting glucose: 75 mg/dL (5/11/2023)  A1C: 5.9 % (5/11/2023)      Cholesterol Screening Once every 5 years if you don't have a lipid disorder. May order more often  based on risk factors. Lipid panel: 05/11/2023         Other Preventive Screenings Covered by Medicare:  Abdominal Aortic Aneurysm (AAA) Screening: covered once if your at risk. You're considered to be at risk if you have a family history of AAA or a male between the age of 65-75 who smoking at least 100 cigarettes in your lifetime.  Lung Cancer Screening: covers low dose CT scan once per year if you meet all of the following conditions: (1) Age 55-77; (2) No signs or symptoms of lung cancer; (3) Current smoker or have quit smoking within the last 15 years; (4) You have a tobacco smoking history of at least 20 pack years (packs per day x number of years you smoked); (5) You get a written order from a healthcare provider.  Glaucoma Screening: covered annually if you're considered high risk: (1) You have diabetes OR (2) Family history of glaucoma OR (3)  aged 50 and older OR (4)  American aged 65 and older  Osteoporosis Screening: covered every 2 years if you meet one of the following conditions: (1) Have a vertebral abnormality; (2) On glucocorticoid therapy for more than 3 months; (3) Have primary hyperparathyroidism; (4) On osteoporosis medications and need to assess response to drug therapy.  HIV Screening: covered annually if you're between the age of 15-65. Also covered annually if you are younger than 15 and older than 65 with risk factors for HIV infection. For pregnant patients, it is covered up to 3 times per pregnancy.    Immunizations:  Immunization Recommendations   Influenza Vaccine Annual influenza vaccination during flu season is recommended for all persons aged >= 6 months who do not have contraindications   Pneumococcal Vaccine   * Pneumococcal conjugate vaccine = PCV13 (Prevnar 13), PCV15 (Vaxneuvance), PCV20 (Prevnar 20)  * Pneumococcal polysaccharide vaccine = PPSV23 (Pneumovax) Adults 19-63 yo with certain risk factors or if 65+ yo  If never received any pneumonia vaccine:  recommend Prevnar 20 (PCV20)  Give PCV20 if previously received 1 dose of PCV13 or PPSV23   Hepatitis B Vaccine 3 dose series if at intermediate or high risk (ex: diabetes, end stage renal disease, liver disease)   Respiratory syncytial virus (RSV) Vaccine - COVERED BY MEDICARE PART D  * RSVPreF3 (Arexvy) CDC recommends that adults 60 years of age and older may receive a single dose of RSV vaccine using shared clinical decision-making (SCDM)   Tetanus (Td) Vaccine - COST NOT COVERED BY MEDICARE PART B Following completion of primary series, a booster dose should be given every 10 years to maintain immunity against tetanus. Td may also be given as tetanus wound prophylaxis.   Tdap Vaccine - COST NOT COVERED BY MEDICARE PART B Recommended at least once for all adults. For pregnant patients, recommended with each pregnancy.   Shingles Vaccine (Shingrix) - COST NOT COVERED BY MEDICARE PART B  2 shot series recommended in those 19 years and older who have or will have weakened immune systems or those 50 years and older     Health Maintenance Due:  There are no preventive care reminders to display for this patient.  Immunizations Due:  There are no preventive care reminders to display for this patient.  Advance Directives   What are advance directives?  Advance directives are legal documents that state your wishes and plans for medical care. These plans are made ahead of time in case you lose your ability to make decisions for yourself. Advance directives can apply to any medical decision, such as the treatments you want, and if you want to donate organs.   What are the types of advance directives?  There are many types of advance directives, and each state has rules about how to use them. You may choose a combination of any of the following:  Living will:  This is a written record of the treatment you want. You can also choose which treatments you do not want, which to limit, and which to stop at a certain time. This  includes surgery, medicine, IV fluid, and tube feedings.   Durable power of  for healthcare (DPAHC):  This is a written record that states who you want to make healthcare choices for you when you are unable to make them for yourself. This person, called a proxy, is usually a family member or a friend. You may choose more than 1 proxy.  Do not resuscitate (DNR) order:  A DNR order is used in case your heart stops beating or you stop breathing. It is a request not to have certain forms of treatment, such as CPR. A DNR order may be included in other types of advance directives.  Medical directive:  This covers the care that you want if you are in a coma, near death, or unable to make decisions for yourself. You can list the treatments you want for each condition. Treatment may include pain medicine, surgery, blood transfusions, dialysis, IV or tube feedings, and a ventilator (breathing machine).  Values history:  This document has questions about your views, beliefs, and how you feel and think about life. This information can help others choose the care that you would choose.  Why are advance directives important?  An advance directive helps you control your care. Although spoken wishes may be used, it is better to have your wishes written down. Spoken wishes can be misunderstood, or not followed. Treatments may be given even if you do not want them. An advance directive may make it easier for your family to make difficult choices about your care.       © Copyright [employer] 2018 Information is for End User's use only and may not be sold, redistributed or otherwise used for commercial purposes. All illustrations and images included in CareNotes® are the copyrighted property of [employer] or [employer]

## 2024-10-07 NOTE — PROGRESS NOTES
Ambulatory Visit  Name: Nata Mansfield      : 1940      MRN: [id number]  Encounter Provider: Juwan Sanders PA-C  Encounter Date: 10/7/2024   Encounter department: [facility]  Medicare annual wellness visit, subsequent  - Medical history reviewed, including existing medical conditions, medications, and surgeries.   - Labs discussed to evaluate cholesterol, blood sugar, kidney function, liver function, and other important markers of health.  - BMI evaluated and discussed.  - Cancer screenings discussed and recommended.  - Vaccination status reviewed and pertinent immunizations and booster shots discussed.  - Bone health reviewed.   - Skin examination.  Discussed importance of sunscreen and other preventative measures for skin cancer. Reviewed abcds melanoma and scc,bcc,ak characteristics to look for  - Lifestyle and health counseling completed including diet, exercise habits, smoking status, alcohol consumption.   - Mental health and wellbeing evaluated and discussed.  - Family history obtained to identify any of hereditary health risks.  All screenings and vaccinations have been reviewed, with benefits and risks discussed. All questions were answered. Appropriate orders have been placed as patient agrees to        Essential hypertension  Controlled, continue current medications and dash diet  Refills provided  Complete labs in 3-4 months. Labs from May reviewed  Orders:    metoprolol succinate (TOPROL-XL) 50 mg 24 hr tablet; Take 1 tablet (50 mg total) by mouth daily    CBC and differential; Future    Comprehensive metabolic panel; Future    Acute nonintractable headache, unspecified headache type  Needs refill on flonase.  Sinuses doing well.   Reviewed allergy to prednisone, patient states no reaction to flonase  Orders:    fluticasone (FLONASE) 50 mcg/act nasal spray; 2 sprays into each nostril daily    Mixed hyperlipidemia  May labs reviewed,  continue simvastatin  Orders:    Lipid panel; Future    Pulmonary emphysema, unspecified emphysema type (HCC)  Breathing stable.  Continue current inhalers. O2 96%, continue to monitor and maintain smoking cessation       Gastroesophageal reflux disease without esophagitis  Controlled, continue PPI and antacid diet         Depression Screening and Follow-up Plan: Patient was screened for depression during today's encounter. They screened negative with a PHQ-2 score of 0.      Preventive health issues were discussed with patient, and age appropriate screening tests were ordered as noted in patient's After Visit Summary. Personalized health advice and appropriate referrals for health education or preventive services given if needed, as noted in patient's After Visit Summary.    History of Present Illness     Patient presents today for medicate annual wellness.   No new symptoms or concerns      Medication Refill  Pertinent negatives include no congestion, coughing, diaphoresis, fatigue, fever, headaches, joint swelling, neck pain or weakness.      Patient Care Team:  Eugenio Angulo DO as PCP - General (Internal Medicine)  Kira Holland MD    Review of Systems   Constitutional:  Negative for activity change, diaphoresis, fatigue and fever.   HENT: Negative.  Negative for congestion and ear pain.    Eyes: Negative.    Respiratory: Negative.  Negative for cough, chest tightness and shortness of breath.    Cardiovascular: Negative.    Gastrointestinal: Negative.    Endocrine: Negative for polydipsia, polyphagia and polyuria.   Genitourinary: Negative.  Negative for dysuria, flank pain, frequency, hematuria and urgency.   Musculoskeletal:  Negative for back pain, joint swelling, neck pain and neck stiffness.   Skin: Negative.    Neurological: Negative.  Negative for dizziness, weakness, light-headedness and headaches.   Hematological:  Negative for adenopathy.   Psychiatric/Behavioral: Negative.  Negative for  confusion and sleep disturbance. The patient is not nervous/anxious.      Medical History Reviewed by provider this encounter:       Annual Wellness Visit Questionnaire   Nata is here for his Subsequent Wellness visit. Last Medicare Wellness visit information reviewed, patient interviewed and updates made to the record today.      Health Risk Assessment:   Patient rates overall health as good. Patient feels that their physical health rating is same. Patient is satisfied with their life. Eyesight was rated as slightly worse. Hearing was rated as same. Patient feels that their emotional and mental health rating is same. Patients states they are never, rarely angry. Patient states they are sometimes unusually tired/fatigued. Pain experienced in the last 7 days has been none. Patient states that he has experienced no weight loss or gain in last 6 months.     Depression Screening:   PHQ-2 Score: 0      Fall Risk Screening:   In the past year, patient has experienced: no history of falling in past year      Home Safety:  Patient does not have trouble with stairs inside or outside of their home. Patient has no working smoke alarms and has working carbon monoxide detector. Home safety hazards include: none.     Nutrition:   Current diet is Regular.     Medications:   Patient is currently taking over-the-counter supplements. OTC medications include: see medication list. Patient is able to manage medications.     Activities of Daily Living (ADLs)/Instrumental Activities of Daily Living (IADLs):   Walk and transfer into and out of bed and chair?: Yes  Dress and groom yourself?: Yes    Bathe or shower yourself?: Yes    Feed yourself? Yes  Do your laundry/housekeeping?: Yes  Manage your money, pay your bills and track your expenses?: Yes  Make your own meals?: Yes    Do your own shopping?: Yes    Durable Medical Equipment Suppliers  none    Previous Hospitalizations:   Any hospitalizations or ED visits within the last 12  months?: No    How many hospitalizations have you had in the last year?: 1-2    Advance Care Planning:   Living will: No    Durable POA for healthcare: Yes    Advanced directive: No    Five wishes given: No      Comments: Daughter is POA    Cognitive Screening:   Provider or family/friend/caregiver concerned regarding cognition?: No    PREVENTIVE SCREENINGS      Cardiovascular Screening:    General: Screening Not Indicated and History Lipid Disorder      Diabetes Screening:     General: Screening Current      Colorectal Cancer Screening:     General: Screening Not Indicated      Prostate Cancer Screening:    General: Screening Not Indicated      Osteoporosis Screening:    General: Screening Not Indicated      Abdominal Aortic Aneurysm (AAA) Screening:    Risk factors include: tobacco use        General: Screening Not Indicated and History AAA      Lung Cancer Screening:     General: Screening Not Indicated      Hepatitis C Screening:    General: Screening Not Indicated    Screening, Brief Intervention, and Referral to Treatment (SBIRT)    Screening  Typical number of drinks in a day: 0  Typical number of drinks in a week: 0  Interpretation: Low risk drinking behavior.    AUDIT-C Screenin) How often did you have a drink containing alcohol in the past year? never  2) How many drinks did you have on a typical day when you were drinking in the past year? 0  3) How often did you have 6 or more drinks on one occasion in the past year? never    AUDIT-C Score: 0  Interpretation: Score 0-3 (male): Negative screen for alcohol misuse    Single Item Drug Screening:  How often have you used an illegal drug (including marijuana) or a prescription medication for non-medical reasons in the past year? never    Single Item Drug Screen Score: 0  Interpretation: Negative screen for possible drug use disorder    Brief Intervention  Alcohol & drug use screenings were reviewed. No concerns regarding substance use disorder identified.  "    Other Counseling Topics:   Car/seat belt/driving safety, skin self-exam, sunscreen and regular weightbearing exercise.     Social Determinants of Health     Financial Resource Strain: Low Risk  (5/11/2023)    Overall Financial Resource Strain (CARDIA)     Difficulty of Paying Living Expenses: Not hard at all   Transportation Needs: No Transportation Needs (5/11/2023)    PRAPARE - Transportation     Lack of Transportation (Medical): No     Lack of Transportation (Non-Medical): No     No results found.    Objective     /70   Pulse 80   Temp (!) 94.4 °F (34.7 °C)   Ht 5' 7\" (1.702 m)   Wt 71.9 kg (158 lb 9.6 oz)   SpO2 96%   BMI 24.84 kg/m²     Physical Exam  Vitals and nursing note reviewed.   Constitutional:       General: He is not in acute distress.     Appearance: He is normal weight. He is not ill-appearing, toxic-appearing or diaphoretic.   HENT:      Head: Normocephalic and atraumatic.      Right Ear: External ear normal.      Left Ear: External ear normal.      Nose: Nose normal.   Eyes:      General: No scleral icterus.        Right eye: No discharge.         Left eye: No discharge.      Extraocular Movements: Extraocular movements intact.      Conjunctiva/sclera: Conjunctivae normal.   Neck:      Vascular: No carotid bruit.   Cardiovascular:      Rate and Rhythm: Normal rate and regular rhythm.      Heart sounds: No murmur heard.  Pulmonary:      Effort: Pulmonary effort is normal. No respiratory distress.      Breath sounds: Normal breath sounds. No wheezing or rales.   Musculoskeletal:      Cervical back: Normal range of motion and neck supple.   Skin:     Findings: No rash.   Neurological:      Mental Status: He is alert. Mental status is at baseline.   Psychiatric:         Mood and Affect: Mood normal.         Behavior: Behavior normal.         Thought Content: Thought content normal.         Judgment: Judgment normal.         "

## 2024-10-21 DIAGNOSIS — J20.9 ACUTE BRONCHITIS WITH BRONCHOSPASM: ICD-10-CM

## 2024-10-22 RX ORDER — ALBUTEROL SULFATE 0.83 MG/ML
2.5 SOLUTION RESPIRATORY (INHALATION) EVERY 6 HOURS PRN
Qty: 350 ML | Refills: 3 | Status: SHIPPED | OUTPATIENT
Start: 2024-10-22 | End: 2024-10-24 | Stop reason: SDUPTHER

## 2024-10-23 ENCOUNTER — TELEPHONE (OUTPATIENT)
Age: 84
End: 2024-10-23

## 2024-10-23 DIAGNOSIS — J43.9 PULMONARY EMPHYSEMA, UNSPECIFIED EMPHYSEMA TYPE (HCC): Primary | Chronic | ICD-10-CM

## 2024-10-23 NOTE — TELEPHONE ENCOUNTER
Medication Albuterol Nebulizer Solution 2.5 MG/3ML    Keycode [id number]     Pharmacy [facility]      Comments : paperwork scanned in media

## 2024-10-24 RX ORDER — ALBUTEROL SULFATE 0.83 MG/ML
2.5 SOLUTION RESPIRATORY (INHALATION) EVERY 6 HOURS PRN
Qty: 350 ML | Refills: 3 | Status: SHIPPED | OUTPATIENT
Start: 2024-10-24

## 2024-10-24 NOTE — TELEPHONE ENCOUNTER
My mistake, I read it wrong and was looking at the inhaler instead of the nebulizer. I'll fix that and resend it

## 2024-10-24 NOTE — TELEPHONE ENCOUNTER
Called and spoke with [facility]. Patients Medicare part B plan won't cover the medication due to the diagnosis that is listed. Please change diagnosis code to Pulmonary emphysema -[J43.9]. That will cover the medication through the medicare part B plan without Prior auth. Please advise.

## 2024-10-24 NOTE — TELEPHONE ENCOUNTER
The associated diagnosis that is listed on the medication is Acute Bronchitis with bronchospasm (J20.9). Please change the associated diagnosis to Pulmonary emphysema -[J43.9]

## 2024-11-01 DIAGNOSIS — R51.9 ACUTE NONINTRACTABLE HEADACHE, UNSPECIFIED HEADACHE TYPE: ICD-10-CM

## 2024-11-01 RX ORDER — FLUTICASONE PROPIONATE 50 MCG
SPRAY, SUSPENSION (ML) NASAL
Qty: 48 ML | Refills: 1 | Status: SHIPPED | OUTPATIENT
Start: 2024-11-01

## 2024-11-12 DIAGNOSIS — E78.00 HYPERCHOLESTEROLEMIA: ICD-10-CM

## 2024-11-12 DIAGNOSIS — J43.9 PULMONARY EMPHYSEMA, UNSPECIFIED EMPHYSEMA TYPE (HCC): Primary | ICD-10-CM

## 2024-11-12 DIAGNOSIS — K21.9 GASTROESOPHAGEAL REFLUX DISEASE WITHOUT ESOPHAGITIS: Chronic | ICD-10-CM

## 2024-11-12 RX ORDER — PANTOPRAZOLE SODIUM 40 MG/1
40 TABLET, DELAYED RELEASE ORAL
Qty: 90 TABLET | Refills: 2 | Status: SHIPPED | OUTPATIENT
Start: 2024-11-12 | End: 2025-08-09

## 2024-11-12 RX ORDER — SIMVASTATIN 40 MG
40 TABLET ORAL
Qty: 90 TABLET | Refills: 2 | Status: SHIPPED | OUTPATIENT
Start: 2024-11-12

## 2024-11-12 RX ORDER — MONTELUKAST SODIUM 10 MG/1
10 TABLET ORAL
Qty: 90 TABLET | Refills: 2 | Status: SHIPPED | OUTPATIENT
Start: 2024-11-12

## 2024-12-09 ENCOUNTER — TELEPHONE (OUTPATIENT)
Age: 84
End: 2024-12-09

## 2024-12-09 DIAGNOSIS — E78.00 HYPERCHOLESTEROLEMIA: ICD-10-CM

## 2024-12-09 DIAGNOSIS — J43.9 PULMONARY EMPHYSEMA, UNSPECIFIED EMPHYSEMA TYPE (HCC): Chronic | ICD-10-CM

## 2024-12-09 RX ORDER — SIMVASTATIN 40 MG
40 TABLET ORAL
Qty: 90 TABLET | Refills: 2 | Status: SHIPPED | OUTPATIENT
Start: 2024-12-09

## 2024-12-09 RX ORDER — ALBUTEROL SULFATE 0.83 MG/ML
2.5 SOLUTION RESPIRATORY (INHALATION) EVERY 6 HOURS PRN
Qty: 350 ML | Refills: 3 | Status: SHIPPED | OUTPATIENT
Start: 2024-12-09

## 2024-12-31 DIAGNOSIS — I10 ESSENTIAL HYPERTENSION: ICD-10-CM

## 2024-12-31 RX ORDER — METOPROLOL SUCCINATE 50 MG/1
50 TABLET, EXTENDED RELEASE ORAL DAILY
Qty: 90 TABLET | Refills: 1 | Status: SHIPPED | OUTPATIENT
Start: 2024-12-31

## 2025-03-08 DIAGNOSIS — N32.81 OAB (OVERACTIVE BLADDER): ICD-10-CM

## 2025-03-10 RX ORDER — TROSPIUM CHLORIDE 20 MG/1
20 TABLET, FILM COATED ORAL 2 TIMES DAILY
Qty: 180 TABLET | Refills: 1 | Status: SHIPPED | OUTPATIENT
Start: 2025-03-10

## 2025-05-15 ENCOUNTER — TELEPHONE (OUTPATIENT)
Age: 85
End: 2025-05-15

## 2025-05-15 DIAGNOSIS — J43.9 PULMONARY EMPHYSEMA, UNSPECIFIED EMPHYSEMA TYPE (HCC): Chronic | ICD-10-CM

## 2025-05-15 RX ORDER — ALBUTEROL SULFATE 0.83 MG/ML
2.5 SOLUTION RESPIRATORY (INHALATION) EVERY 6 HOURS PRN
Qty: 350 ML | Refills: 3 | Status: SHIPPED | OUTPATIENT
Start: 2025-05-15

## 2025-05-15 NOTE — TELEPHONE ENCOUNTER
Pt needs refill sent to [facility]    Albuterol 2.5 mg/3ml 0.083%  w/refills    Any probs - call pt   Interpolation Flap Text: A decision was made to reconstruct the defect utilizing an interpolation axial flap and a staged reconstruction.  A telfa template was made of the defect.  This telfa template was then used to outline the interpolation flap.  The donor area for the pedicle flap was then injected with anesthesia.  The flap was excised through the skin and subcutaneous tissue down to the layer of the underlying musculature.  The interpolation flap was carefully excised within this deep plane to maintain its blood supply.  The edges of the donor site were undermined.   The donor site was closed in a primary fashion.  The pedicle was then rotated into position and sutured.  Once the tube was sutured into place, adequate blood supply was confirmed with blanching and refill.  The pedicle was then wrapped with xeroform gauze and dressed appropriately with a telfa and gauze bandage to ensure continued blood supply and protect the attached pedicle.

## 2025-06-06 ENCOUNTER — TELEPHONE (OUTPATIENT)
Age: 85
End: 2025-06-06

## 2025-06-06 NOTE — TELEPHONE ENCOUNTER
Pt is on Albuterol - solution and inhaler    When pt uses solution - he gets terrible pain from both ears down into his neck - stabbing pain    Pt has no problem when using the inhaler - wants to know if he can use inhaler only    Please call pt - ok to leave message          Pt will need rx for inhaler sent to [facility] [address]

## 2025-06-09 ENCOUNTER — TELEPHONE (OUTPATIENT)
Age: 85
End: 2025-06-09

## 2025-06-09 DIAGNOSIS — J43.9 PULMONARY EMPHYSEMA, UNSPECIFIED EMPHYSEMA TYPE (HCC): ICD-10-CM

## 2025-06-09 RX ORDER — ALBUTEROL SULFATE 90 UG/1
2 INHALANT RESPIRATORY (INHALATION) 2 TIMES DAILY PRN
Qty: 18 G | Refills: 5 | Status: SHIPPED | OUTPATIENT
Start: 2025-06-09

## 2025-06-09 NOTE — TELEPHONE ENCOUNTER
Pt needs rx sent to [facility] [address]    ALBUTEROL SULFATE HFA INHALATION AEROSOL   W/REFILLS    Any probs - call pt

## 2025-06-16 PROBLEM — I71.40 ABDOMINAL AORTIC ANEURYSM WITHOUT RUPTURE (HCC): Chronic | Status: RESOLVED | Noted: 2020-03-03 | Resolved: 2025-06-16

## 2025-06-16 NOTE — PROGRESS NOTES
[facility] Cardiology   Office Visit    Nata Mansfield 85 y.o. male MRN: [id number]    06/17/25      Assessment & Plan  CAD s/p CABG x3  EKG shows NSR without acute ischemic abnormalities.  Continue simvastatin and Toprol-XL.  Patient has not been taking ASA 81 mg daily, however is agreeable to resume.  Advised to notify our office for any new/worsening symptoms.  Essential hypertension  BP is well-controlled.  Continue Toprol-XL.  Encourage ambulatory monitoring and low-sodium diet.  Dyslipidemia  Continue simvastatin, dietary control, and periodic surveillance.  Infrarenal abdominal aortic aneurysm (AAA) without rupture (HCC)  CTA abdomen/pelvis ordered for periodic surveillance.        Interval history: Nata Mansfield is a very pleasant 85 y.o. year old male with history of CAD s/p CABG x3, hypertension, dyslipidemia, carotid stenosis s/p CEA who presents for office visit.  During previous visit with cardiology in September 2023 patient was ordered pharmacological MPI stress test which was negative for evidence of reversible perfusion defects.  Patient states he has been well overall from a cardiac standpoint since that time.  Patient has not been taking ASA, however is agreeable to resume.  Otherwise, endorses strict compliance to all medications.  Patient notes chronic tingling from sternotomy site which has been stable.  Denies angina/anginal equivalent or any additional complaints at this time.  Patient is proud to announce that his granddaughter placed second in the nation for rowing.  Follows with Dr. Holland as primary cardiologist.       Review of Systems:  Review of Systems   Constitutional:  Negative for chills and fever.   HENT:  Negative for ear pain and sore throat.    Eyes:  Negative for pain and visual disturbance.   Respiratory:  Negative for cough and shortness of breath.    Cardiovascular:  Negative for chest pain, palpitations and leg swelling.   Gastrointestinal:  Negative for abdominal  "pain and vomiting.   Genitourinary:  Negative for dysuria and hematuria.   Musculoskeletal:  Negative for arthralgias and back pain.   Skin:  Negative for color change and rash.   Neurological:  Negative for seizures and syncope.   All other systems reviewed and are negative.      PHYSICAL EXAM:  Vitals:   Vitals:    06/17/25 0937   BP: 136/74   BP Location: Left arm   Patient Position: Sitting   Cuff Size: Standard   Pulse: 76   Resp: 16   SpO2: 97%   Weight: 72.6 kg (160 lb)   Height: 5' 7\" (1.702 m)        Physical Exam:  GEN: Alert and oriented x 3, in no acute distress.  Well appearing and well nourished.   HEENT: Sclera anicteric, conjunctivae pink, mucous membranes moist. Oropharynx clear.   NECK: Supple, no carotid bruits, no significant JVD. Trachea midline, no thyromegaly.   HEART: Regular rhythm, normal S1 and S2, no murmurs, clicks, gallops or rubs. PMI nondisplaced, no thrills.   LUNGS: Clear to auscultation bilaterally; no wheezes, rales, or rhonchi. No increased work of breathing or signs of respiratory distress.   ABDOMEN: Soft, nontender, nondistended, normoactive bowel sounds.   EXTREMITIES: Skin warm and well perfused, no clubbing or cyanosis, no edema.  NEURO: No focal findings. Normal speech. Mood and affect normal.   SKIN: Normal without suspicious lesions on exposed skin.    Follow up: 12 months or sooner as needed    Allergies   Allergen Reactions    Prednisone Other (See Comments)     Constipation         Current Medications[1]    Past Medical History[2]    Family History[3]    Past Medical History[4]    Past Surgical History[5]    Social History     Socioeconomic History    Marital status:      Spouse name: Not on file    Number of children: 2    Years of education: Not on file    Highest education level: Not on file   Occupational History    Not on file   Tobacco Use    Smoking status: Former     Current packs/day: 0.00     Average packs/day: 1 pack/day for 60.0 years (60.0 ttl " pk-yrs)     Types: Cigarettes     Start date:      Quit date: 2012     Years since quittin.4     Passive exposure: Current    Smokeless tobacco: Never   Vaping Use    Vaping status: Never Used   Substance and Sexual Activity    Alcohol use: Yes     Alcohol/week: 1.0 standard drink of alcohol     Types: 1 Shots of liquor per week     Comment: social shot once a week    Drug use: No    Sexual activity: Yes     Partners: Female   Other Topics Concern    Not on file   Social History Narrative    Caffeine use     Social Drivers of Health     Financial Resource Strain: Low Risk  (2023)    Overall Financial Resource Strain (CARDIA)     Difficulty of Paying Living Expenses: Not hard at all   Food Insecurity: Not on file   Transportation Needs: No Transportation Needs (2023)    PRAPARE - Transportation     Lack of Transportation (Medical): No     Lack of Transportation (Non-Medical): No   Physical Activity: Inactive (10/13/2021)    Exercise Vital Sign     Days of Exercise per Week: 0 days     Minutes of Exercise per Session: 0 min   Stress: No Stress Concern Present (10/13/2021)    [employer] [place] [employer] - Occupational Stress Questionnaire     Feeling of Stress : Only a little   Social Connections: Not on file   Intimate Partner Violence: Not on file   Housing Stability: Not on file         LABORATORY RESULTS:    Lab Results   Component Value Date    WBC 7.81 2023    HGB 14.7 2023    HCT 45.3 2023     (H) 2023     2023     Lab Results   Component Value Date    GLUCOSE 102 2015    CALCIUM 9.0 2023     2015    K 4.3 2023    CO2 26 2023     (H) 2023    BUN 23 2023    CREATININE 1.03 2023     Lab Results   Component Value Date    HGBA1C 5.9 (H) 2023       Lipid Profile:   Lab Results   Component Value Date    CHOL 132 2015    CHOL 134 06/10/2014     Lab Results   Component Value  Date    HDL 38 (L) 05/11/2023    HDL 38 (L) 10/14/2021    HDL 49 04/25/2019     Lab Results   Component Value Date    LDLCALC 45 05/11/2023    LDLCALC 46 10/14/2021    LDLCALC 58 04/25/2019     Lab Results   Component Value Date    TRIG 107 05/11/2023    TRIG 142 10/14/2021    TRIG 113 04/25/2019       The ASCVD Risk score (Padma REDD, et al., 2019) failed to calculate for the following reasons:    The 2019 ASCVD risk score is only valid for ages 40 to 79    1. CAD s/p CABG x3  POCT ECG    aspirin 81 mg chewable tablet      2. Essential hypertension        3. Dyslipidemia        4. Infrarenal abdominal aortic aneurysm (AAA) without rupture (HCC)  CTA abdomen pelvis w wo contrast          Imaging: I have reviewed all pertinent imaging studies.    EKG reviewed personally: Normal sinus rhythm    Recommend aggressive risk factor modification and therapeutic lifestyle changes.  Low-salt, low-calorie, low-fat, low-cholesterol diet with regular exercise and to optimize weight.    Discussed concepts of atherosclerosis, including signs and symptoms of cardiac disease.    Medications reviewed and possible side effects discussed.  Previous studies were reviewed.    Safety measures were reviewed.  All questions and concerns addressed.  Patient was advised to report any problems requiring medical attention.    Follow-up with PCP and appropriate specialist and lab work as discussed.    Return for follow up visit as scheduled or earlier, if needed.  Thank you for allowing me to participate in the care and evaluation of your patient.  Should you have any questions, please feel free to contact me.    Juan Ramon Beth PA-C  6/17/2025,10:21 AM         [1]   Current Outpatient Medications:     albuterol (PROVENTIL HFA,VENTOLIN HFA) 90 mcg/act inhaler, Inhale 2 puffs 2 (two) times a day as needed for wheezing, Disp: 18 g, Rfl: 5    Ascorbic Acid (VITAMIN C) 100 MG tablet, Take 1 tablet by mouth in the morning., Disp: , Rfl:      aspirin 81 mg chewable tablet, Chew 1 tablet (81 mg total) daily, Disp: , Rfl:     azelastine (ASTELIN) 0.1 % nasal spray, 1-2 sprays into each nostril 2 (two) times a day, Disp: 30 mL, Rfl: 5    B Complex Vitamins (VITAMIN B COMPLEX PO), , Disp: , Rfl:     cetirizine (ZyrTEC Allergy) 10 mg tablet, Take 10 mg by mouth in the morning., Disp: , Rfl:     Cholecalciferol 50 MCG (2000 UT) CAPS, Take 1 capsule by mouth, Disp: , Rfl:     fluticasone (FLONASE) 50 mcg/act nasal spray, SPRAY 2 SPRAYS INTO EACH NOSTRIL EVERY DAY, Disp: 48 mL, Rfl: 1    metoprolol succinate (TOPROL-XL) 50 mg 24 hr tablet, Take 1 tablet (50 mg total) by mouth daily, Disp: 90 tablet, Rfl: 1    montelukast (SINGULAIR) 10 mg tablet, Take 1 tablet (10 mg total) by mouth daily at bedtime, Disp: 90 tablet, Rfl: 2    pantoprazole (PROTONIX) 40 mg tablet, Take 1 tablet (40 mg total) by mouth daily before breakfast, Disp: 90 tablet, Rfl: 2    simvastatin (ZOCOR) 40 mg tablet, Take 1 tablet (40 mg total) by mouth daily at bedtime, Disp: 90 tablet, Rfl: 2    sodium chloride (OCEAN) 0.65 % nasal spray, 1 spray into each nostril, Disp: , Rfl:     tiotropium (Spiriva Respimat) 2.5 MCG/ACT AERS inhaler, Inhale in the morning., Disp: , Rfl:     trospium chloride (SANCTURA) 20 mg tablet, TAKE 1 TABLET BY MOUTH TWICE A DAY, Disp: 180 tablet, Rfl: 1  [2]   Past Medical History:  Diagnosis Date    Benign prostatic hyperplasia     CAD (coronary artery disease)     Cervicogenic headache     COPD (chronic obstructive pulmonary disease) (HCC)     GERD (gastroesophageal reflux disease)     Hx of CABG     Hyperlipemia     Hypertension     Kidney stone     Renal lesion    [3]   Family History  Problem Relation Name Age of Onset    Nephrolithiasis Mother      Heart attack Father      Stroke Father     [4]   Past Medical History:  Diagnosis Date    Benign prostatic hyperplasia     CAD (coronary artery disease)     Cervicogenic headache     COPD (chronic obstructive  pulmonary disease) (HCC)     GERD (gastroesophageal reflux disease)     Hx of CABG     Hyperlipemia     Hypertension     Kidney stone     Renal lesion    [5]   Past Surgical History:  Procedure Laterality Date    CAROTID ENDARTERECTOMY Right     COLONOSCOPY      CORONARY ARTERY BYPASS GRAFT  2011    x 3    HERNIA REPAIR      with mesh    JOINT REPLACEMENT Right     LITHOTRIPSY      AL CYSTO INSERTION TRANSPROSTATIC IMPLANT SINGLE N/A 05/27/2021    Procedure: CYSTOSCOPY WITH INSERTION UROLIFT;  Surgeon: Mir Coy MD;  Location: [facility];  Service: Urology    TOTAL HIP ARTHROPLASTY Right 2015

## 2025-06-16 NOTE — PATIENT INSTRUCTIONS
Please follow instructions as recommended during visit.  Recommend low salt DASH diet  Strongly encourage compliance with your medications.   Please reach out to us if you have any questions   Recommend you present to the emergency room or call our office if you have chest pain, chest pressure, shortness of breath, any new, persistent or progressive symptoms.     To schedule appointment for CTA, please contact Central Scheduling at [phone].

## 2025-06-17 ENCOUNTER — OFFICE VISIT (OUTPATIENT)
Dept: CARDIOLOGY CLINIC | Facility: CLINIC | Age: 85
End: 2025-06-17
Payer: MEDICARE

## 2025-06-17 VITALS
SYSTOLIC BLOOD PRESSURE: 136 MMHG | DIASTOLIC BLOOD PRESSURE: 74 MMHG | WEIGHT: 160 LBS | HEIGHT: 67 IN | RESPIRATION RATE: 16 BRPM | HEART RATE: 76 BPM | OXYGEN SATURATION: 97 % | BODY MASS INDEX: 25.11 KG/M2

## 2025-06-17 DIAGNOSIS — E78.5 DYSLIPIDEMIA: ICD-10-CM

## 2025-06-17 DIAGNOSIS — I25.10 CAD, MULTIPLE VESSEL: Primary | ICD-10-CM

## 2025-06-17 DIAGNOSIS — I10 ESSENTIAL HYPERTENSION: Chronic | ICD-10-CM

## 2025-06-17 DIAGNOSIS — I71.43 INFRARENAL ABDOMINAL AORTIC ANEURYSM (AAA) WITHOUT RUPTURE (HCC): ICD-10-CM

## 2025-06-17 PROCEDURE — 99214 OFFICE O/P EST MOD 30 MIN: CPT

## 2025-06-17 RX ORDER — ASPIRIN 81 MG/1
81 TABLET, CHEWABLE ORAL DAILY
Start: 2025-06-17

## 2025-06-17 NOTE — ASSESSMENT & PLAN NOTE
EKG shows NSR without acute ischemic abnormalities.  Continue simvastatin and Toprol-XL.  Patient has not been taking ASA 81 mg daily, however is agreeable to resume.  Advised to notify our office for any new/worsening symptoms.

## 2025-06-18 PROCEDURE — 93000 ELECTROCARDIOGRAM COMPLETE: CPT

## 2025-06-29 DIAGNOSIS — I10 ESSENTIAL HYPERTENSION: ICD-10-CM

## 2025-07-01 RX ORDER — METOPROLOL SUCCINATE 50 MG/1
50 TABLET, EXTENDED RELEASE ORAL DAILY
Qty: 90 TABLET | Refills: 0 | Status: SHIPPED | OUTPATIENT
Start: 2025-07-01 | End: 2025-07-03 | Stop reason: SDUPTHER

## 2025-07-03 ENCOUNTER — TELEPHONE (OUTPATIENT)
Age: 85
End: 2025-07-03

## 2025-07-03 DIAGNOSIS — I10 ESSENTIAL HYPERTENSION: ICD-10-CM

## 2025-07-03 RX ORDER — METOPROLOL SUCCINATE 50 MG/1
50 TABLET, EXTENDED RELEASE ORAL DAILY
Qty: 90 TABLET | Refills: 0 | Status: SHIPPED | OUTPATIENT
Start: 2025-07-03

## 2025-07-03 NOTE — TELEPHONE ENCOUNTER
Pt needs rx sent to [facility] [address]     Metoprolol succ er 50 mg tabs  w/refills    Any probs - call pt

## 2025-07-07 DIAGNOSIS — I71.43 INFRARENAL ABDOMINAL AORTIC ANEURYSM (AAA) WITHOUT RUPTURE (HCC): ICD-10-CM

## 2025-07-07 DIAGNOSIS — I25.10 CAD, MULTIPLE VESSEL: Primary | ICD-10-CM

## 2025-07-11 ENCOUNTER — HOSPITAL ENCOUNTER (OUTPATIENT)
Dept: CT IMAGING | Facility: HOSPITAL | Age: 85
End: 2025-07-11
Payer: MEDICARE

## 2025-07-11 ENCOUNTER — APPOINTMENT (OUTPATIENT)
Dept: LAB | Facility: HOSPITAL | Age: 85
End: 2025-07-11
Payer: MEDICARE

## 2025-07-11 DIAGNOSIS — I71.43 INFRARENAL ABDOMINAL AORTIC ANEURYSM (AAA) WITHOUT RUPTURE (HCC): ICD-10-CM

## 2025-07-11 DIAGNOSIS — I10 ESSENTIAL HYPERTENSION: ICD-10-CM

## 2025-07-11 DIAGNOSIS — I25.10 CAD, MULTIPLE VESSEL: ICD-10-CM

## 2025-07-11 DIAGNOSIS — E78.2 MIXED HYPERLIPIDEMIA: Chronic | ICD-10-CM

## 2025-07-11 DIAGNOSIS — I71.43 INFRARENAL ABDOMINAL AORTIC ANEURYSM (AAA) WITHOUT RUPTURE (HCC): Primary | ICD-10-CM

## 2025-07-11 LAB
BUN SERPL-MCNC: 16 MG/DL (ref 5–25)
CREAT SERPL-MCNC: 0.92 MG/DL (ref 0.6–1.3)
GFR SERPL CREATININE-BSD FRML MDRD: 75 ML/MIN/1.73SQ M

## 2025-07-11 PROCEDURE — 82565 ASSAY OF CREATININE: CPT

## 2025-07-11 PROCEDURE — 36415 COLL VENOUS BLD VENIPUNCTURE: CPT

## 2025-07-11 PROCEDURE — 84520 ASSAY OF UREA NITROGEN: CPT

## 2025-07-11 PROCEDURE — 74174 CTA ABD&PLVS W/CONTRAST: CPT

## 2025-07-11 RX ADMIN — IOHEXOL 100 ML: 350 INJECTION, SOLUTION INTRAVENOUS at 13:55

## 2025-07-18 DIAGNOSIS — I70.1 RENAL ARTERY STENOSIS (HCC): Primary | ICD-10-CM

## 2025-08-11 ENCOUNTER — OFFICE VISIT (OUTPATIENT)
Dept: UROLOGY | Facility: CLINIC | Age: 85
End: 2025-08-11
Payer: MEDICARE

## (undated) DEVICE — BAG URINE DRAINAGE 2000ML ANTI RFLX LF

## (undated) DEVICE — INVIEW CLEAR LEGGINGS: Brand: CONVERTORS

## (undated) DEVICE — LUBRICANT SURGILUBE TUBE 4 OZ  FLIP TOP

## (undated) DEVICE — CATH SECURE FOLEY

## (undated) DEVICE — PACK TUR

## (undated) DEVICE — CHLORHEXIDINE 4PCT 4 OZ

## (undated) DEVICE — UROCATCH BAG

## (undated) DEVICE — CATH FOLEY 20FR 5ML 2 WAY SILICONE ELASTIMER

## (undated) DEVICE — SPONGE 4 X 4 XRAY 16 PLY STRL LF RFD

## (undated) DEVICE — GLOVE SRG BIOGEL 7